# Patient Record
Sex: FEMALE | Race: WHITE | NOT HISPANIC OR LATINO | Employment: UNEMPLOYED
[De-identification: names, ages, dates, MRNs, and addresses within clinical notes are randomized per-mention and may not be internally consistent; named-entity substitution may affect disease eponyms.]

---

## 2017-06-10 ENCOUNTER — HEALTH MAINTENANCE LETTER (OUTPATIENT)
Age: 42
End: 2017-06-10

## 2017-07-25 ENCOUNTER — OFFICE VISIT (OUTPATIENT)
Dept: OBGYN | Facility: CLINIC | Age: 42
End: 2017-07-25
Payer: COMMERCIAL

## 2017-07-25 VITALS
HEIGHT: 65 IN | WEIGHT: 137 LBS | BODY MASS INDEX: 22.82 KG/M2 | DIASTOLIC BLOOD PRESSURE: 66 MMHG | SYSTOLIC BLOOD PRESSURE: 112 MMHG

## 2017-07-25 DIAGNOSIS — Z11.8 SCREENING FOR CHLAMYDIAL DISEASE: ICD-10-CM

## 2017-07-25 DIAGNOSIS — Z01.419 ENCOUNTER FOR GYNECOLOGICAL EXAMINATION WITHOUT ABNORMAL FINDING: Primary | ICD-10-CM

## 2017-07-25 DIAGNOSIS — N92.1 MENORRHAGIA WITH IRREGULAR CYCLE: ICD-10-CM

## 2017-07-25 DIAGNOSIS — Z11.3 SCREEN FOR STD (SEXUALLY TRANSMITTED DISEASE): ICD-10-CM

## 2017-07-25 PROCEDURE — 99386 PREV VISIT NEW AGE 40-64: CPT | Performed by: OBSTETRICS & GYNECOLOGY

## 2017-07-25 PROCEDURE — 86803 HEPATITIS C AB TEST: CPT | Performed by: OBSTETRICS & GYNECOLOGY

## 2017-07-25 PROCEDURE — 36415 COLL VENOUS BLD VENIPUNCTURE: CPT | Performed by: OBSTETRICS & GYNECOLOGY

## 2017-07-25 PROCEDURE — 86696 HERPES SIMPLEX TYPE 2 TEST: CPT | Performed by: OBSTETRICS & GYNECOLOGY

## 2017-07-25 PROCEDURE — 87389 HIV-1 AG W/HIV-1&-2 AB AG IA: CPT | Performed by: OBSTETRICS & GYNECOLOGY

## 2017-07-25 PROCEDURE — 87491 CHLMYD TRACH DNA AMP PROBE: CPT | Performed by: OBSTETRICS & GYNECOLOGY

## 2017-07-25 PROCEDURE — 86695 HERPES SIMPLEX TYPE 1 TEST: CPT | Performed by: OBSTETRICS & GYNECOLOGY

## 2017-07-25 PROCEDURE — 86780 TREPONEMA PALLIDUM: CPT | Performed by: OBSTETRICS & GYNECOLOGY

## 2017-07-25 PROCEDURE — 87340 HEPATITIS B SURFACE AG IA: CPT | Performed by: OBSTETRICS & GYNECOLOGY

## 2017-07-25 PROCEDURE — 87591 N.GONORRHOEAE DNA AMP PROB: CPT | Performed by: OBSTETRICS & GYNECOLOGY

## 2017-07-25 RX ORDER — BUPROPION HYDROCHLORIDE 150 MG/1
150 TABLET ORAL
COMMUNITY
Start: 2010-02-18 | End: 2018-10-31

## 2017-07-25 ASSESSMENT — ANXIETY QUESTIONNAIRES
5. BEING SO RESTLESS THAT IT IS HARD TO SIT STILL: MORE THAN HALF THE DAYS
7. FEELING AFRAID AS IF SOMETHING AWFUL MIGHT HAPPEN: NOT AT ALL
1. FEELING NERVOUS, ANXIOUS, OR ON EDGE: SEVERAL DAYS
3. WORRYING TOO MUCH ABOUT DIFFERENT THINGS: NOT AT ALL
2. NOT BEING ABLE TO STOP OR CONTROL WORRYING: NOT AT ALL
6. BECOMING EASILY ANNOYED OR IRRITABLE: MORE THAN HALF THE DAYS
IF YOU CHECKED OFF ANY PROBLEMS ON THIS QUESTIONNAIRE, HOW DIFFICULT HAVE THESE PROBLEMS MADE IT FOR YOU TO DO YOUR WORK, TAKE CARE OF THINGS AT HOME, OR GET ALONG WITH OTHER PEOPLE: SOMEWHAT DIFFICULT
GAD7 TOTAL SCORE: 7

## 2017-07-25 ASSESSMENT — PATIENT HEALTH QUESTIONNAIRE - PHQ9: 5. POOR APPETITE OR OVEREATING: MORE THAN HALF THE DAYS

## 2017-07-25 NOTE — PROGRESS NOTES
Maria Esther is a 42 year old  female who presents for annual exam.     Besides routine health maintenance,  she would like to discuss getting cervix scraped.    HPI:  The patient's PCP is Faby Doshi MD.    Having issues with heavy bleeding. Can last 11 days. This has gone on for years. Can also have early cycles and late ones. Can also have midcycle spotting.   Wonders if she has prolapse. Feels some bladder dropping.  Has hx of BV. Douches every day with either vinegar or H202  Has multiple partners. Wants STD testing.        GYNECOLOGIC HISTORY:    Patient's last menstrual period was 2017 (exact date).  Her current contraception method is: tubal ligation and condoms.  She  reports that she has been smoking Cigarettes.  She has smoked for the past 5.00 years. She has never used smokeless tobacco.  Tobacco Cessation Action Plan: Information offered: Patient not interested at this time  Patient is sexually active.  STD testing offered?  Accepted  Last PHQ-9 score on record =   PHQ-9 SCORE 2017   Total Score -   Total Score 5     Last GAD7 score on record =   STEVEN-7 SCORE 2017   Total Score 7     Alcohol Score = 3    HEALTH MAINTENANCE:  Last Mammo: 11, Result: normal, Next Mammo: Needs to schedule  Pap:   Lab Results   Component Value Date    PAP NIL, HPV- 02/10/2016    PAP ASC-US 2011    PAP NIL 2010      Colonoscopy:  Never, Result: not applicable  Dexa:  Never    Health maintenance updated:  yes    HISTORY:  Obstetric History       T3      L3     SAB0   TAB0   Ectopic0   Multiple0   Live Births2       # Outcome Date GA Lbr Lyndon/2nd Weight Sex Delivery Anes PTL Lv   3 Term 99 39w0d  6 lb 14 oz (3.118 kg) M CS-Unspec Spinal  BRAD      Name: Bakari   2 Term 98 37w0d 05:00 4 lb 11 oz (2.126 kg) M CS-Unspec Spinal  BRAD      Name: Tamiko   1 Term                   Patient Active Problem List   Diagnosis     Nondependent amphetamine or related acting  "sympathomimetic abuse     Restless leg syndrome     Mild major depression (H)     Alcohol abuse, in remission     High-risk pregnancy     Bipolar affective disorder (H)     CARDIOVASCULAR SCREENING; LDL GOAL LESS THAN 160     ASCUS on Pap smear     Past Surgical History:   Procedure Laterality Date     C ANESTH,SURG BREAST RECONSTRUCTIVE       C NONSPECIFIC PROCEDURE   &      x 2, previous tear in cervix      Social History   Substance Use Topics     Smoking status: Current Every Day Smoker     Years: 5.00     Types: Cigarettes     Smokeless tobacco: Never Used      Comment: 2-3 cigs/day     Alcohol use No      *Patient is Adopted          Negative family history of: Unknown/Adopted            Current Outpatient Prescriptions   Medication Sig     buPROPion (WELLBUTRIN XL) 150 MG 24 hr tablet Take 150 mg by mouth     ROPINIRole HCl (REQUIP PO)      TOPAMAX 50 MG PO TABS 1 TABLET DAILY at bedtime (Patient not taking: No sig reported)     No current facility-administered medications for this visit.      Allergies   Allergen Reactions     Oxycontin [Oxycodone Hcl] Itching     Percocet [Codeine] Itching     Vicodin [Acetaminophen] Itching       Past medical, surgical, social and family histories were reviewed and updated in EPIC.    ROS:   12 point review of systems negative other than symptoms noted below.  Psychiatric: Depression and Mckeon    EXAM:  /66  Ht 5' 4.5\" (1.638 m)  Wt 137 lb (62.1 kg)  LMP 2017 (Exact Date)  BMI 23.15 kg/m2   BMI: Body mass index is 23.15 kg/(m^2).    PHYSICAL EXAM:  Constitutional:  Appearance: Well nourished, well developed, alert, in no acute distress  Neck:  Lymph Nodes:  No lymphadenopathy present    Thyroid:  Gland size normal, nontender, no nodules or masses present  on palpation  Chest:  Respiratory Effort:  Breathing unlabored  Cardiovascular:    Heart: Auscultation:  Regular rate, normal rhythm, no murmurs present  Breasts: Inspection of Breasts:  " No lymphadenopathy present, IMPLANTS PRESENT BILATERALY    Palpation of Breasts and Axillae:  No masses present on palpation, no  breast tenderness    Axillary Lymph Nodes:  No lymphadenopathy present  Gastrointestinal:   Abdominal Examination:  Abdomen nontender to palpation, tone normal without rigidity or guarding, no masses present, umbilicus without lesions   Liver and Spleen:  No hepatomegaly present, liver nontender to palpation    Hernias:  No hernias present  Lymphatic: Lymph Nodes:  No other lymphadenopathy present  Skin:  General Inspection:  No rashes present, no lesions present, no areas of  discoloration    Genitalia and Groin:  No rashes present, no lesions present, no areas of  discoloration, no masses present  Neurologic/Psychiatric:    Mental Status:  Oriented X3     Pelvic Exam:  External Genitalia:     Normal appearance for age, no discharge present, no tenderness present, no inflammatory lesions present, color normal  Vagina:     Normal vaginal vault without central or paravaginal defects, no discharge present, no inflammatory lesions present, no masses present  Bladder:     Nontender to palpation  Urethra:   Urethral Body:  Urethra palpation normal, urethra structural support normal   Urethral Meatus:  No erythema or lesions present  Cervix:     Appearance healthy, no lesions present, nontender to palpation, no bleeding present  Uterus:     Uterus: firm, normal sized and nontender, anteverted in position.   Adnexa:     No adnexal tenderness present, no adnexal masses present  Perineum:     Perineum within normal limits, no evidence of trauma, no rashes or skin lesions present  Anus:     Anus within normal limits, no hemorrhoids present  Inguinal Lymph Nodes:     No lymphadenopathy present  Pubic Hair:     Normal pubic hair distribution for age  Genitalia and Groin:     No rashes present, no lesions present, no areas of discoloration, no masses present      COUNSELING:   Reviewed preventive  health counseling, as reflected in patient instructions    BMI: Body mass index is 23.15 kg/(m^2).        ASSESSMENT:  42 year old female with satisfactory annual exam.  ICD-10-CM    1. Encounter for gynecological examination without abnormal finding Z01.419    2. Screen for STD (sexually transmitted disease) Z11.3 NEISSERIA GONORRHOEA PCR     Anti Treponema     Herpes Simplex Virus 1 and 2 IgG     HIV Antigen Antibody Combo     Hepatitis B Surface  Antigen     Hepatitis C Antibody   3. Screening for chlamydial disease Z11.8 CHLAMYDIA TRACHOMATIS PCR       PLAN:  Discussed probiotics for vaginal health and less douching.   Discussed Ablation but will still have irregular bleeding just lighter. Discussed micronor since she still smokes.   Discussed smoking.  Referred back to PCP for restarting her psych meds.   Reassured no prolapse.    Clyde Piña MD

## 2017-07-25 NOTE — MR AVS SNAPSHOT
"              After Visit Summary   7/25/2017    Maria Esther Haro    MRN: [id number]           Patient Information     Date Of Birth          1975        Visit Information        Provider Department      7/25/2017 2:30 PM Clyde Piña MD [facility]        Today's Diagnoses     Encounter for gynecological examination without abnormal finding    -  1    Screen for STD (sexually transmitted disease)        Screening for chlamydial disease        Menorrhagia with irregular cycle           Follow-ups after your visit        Who to contact     If you have questions or need follow up information about today's clinic visit or your schedule please contact [facility] directly at [phone].  Normal or non-critical lab and imaging results will be communicated to you by MyChart, letter or phone within 4 business days after the clinic has received the results. If you do not hear from us within 7 days, please contact the clinic through [employer] or phone. If you have a critical or abnormal lab result, we will notify you by phone as soon as possible.  Submit refill requests through [employer] or call your pharmacy and they will forward the refill request to us. Please allow 3 business days for your refill to be completed.          Additional Information About Your Visit        MyChart Information     [employer] gives you secure access to your electronic health record. If you see a primary care provider, you can also send messages to your care team and make appointments. If you have questions, please call your primary care clinic.  If you do not have a primary care provider, please call [phone] and they will assist you.        Care EveryWhere ID     This is your Care EveryWhere ID. This could be used by other organizations to access your [facility] medical records  [id number]        Your Vitals Were     Height Last Period BMI (Body Mass Index)             5' 4.5\" (1.638 m) " 07/18/2017 (Exact Date) 23.15 kg/m2          Blood Pressure from Last 3 Encounters:   07/25/17 112/66   07/11/16 100/69   07/11/16 120/60    Weight from Last 3 Encounters:   07/25/17 137 lb (62.1 kg)   07/11/16 135 lb (61.2 kg)   07/11/16 135 lb (61.2 kg)              We Performed the Following     Anti Treponema     CHLAMYDIA TRACHOMATIS PCR     Hepatitis B Surface  Antigen     Hepatitis C Antibody     Herpes Simplex Virus 1 and 2 IgG     HIV Antigen Antibody Combo     NEISSERIA GONORRHOEA PCR        Primary Care Provider Office Phone # Fax #    Dianadiana Doshi [phone]       [facility] [address] [facility] [address]        Equal Access to Services     BROOKE GONZALEZ : Hadii alex richmondo Soenrico, waaxda luqadaha, qaybta kaalmada adekellyyavivi, lyn mccarthy. So [facility] [phone].    ATENCIÓN: Si habla español, tiene a figueroa disposición servicios gratuitos de asistencia lingüística. Llame al [phone].    We comply with applicable federal civil rights laws and [place] laws. We do not discriminate on the basis of race, color, national origin, age, disability sex, sexual orientation or gender identity.            Thank you!     Thank you for choosing [facility]  for your care. Our goal is always to provide you with excellent care. Hearing back from our patients is one way we can continue to improve our services. Please take a few minutes to complete the written survey that you may receive in the mail after your visit with us. Thank you!             Your Updated Medication List - Protect others around you: Learn how to safely use, store and throw away your medicines at www.disposemymeds.org.          This list is accurate as of: 7/25/17  4:36 PM.  Always use your most recent med list.                   Brand Name Dispense Instructions for use Diagnosis    buPROPion 150 MG 24 hr tablet    WELLBUTRIN XL     Take 150 mg by mouth         REQUIP PO           TOPAMAX 50 MG tablet   Generic drug:  topiramate     60 Tab    1 TABLET DAILY at bedtime    Bipolar affective disorder (H), Mild major depression (H)

## 2017-07-26 LAB
C TRACH DNA SPEC QL NAA+PROBE: NORMAL
N GONORRHOEA DNA SPEC QL NAA+PROBE: NORMAL
SPECIMEN SOURCE: NORMAL
SPECIMEN SOURCE: NORMAL

## 2017-07-26 ASSESSMENT — PATIENT HEALTH QUESTIONNAIRE - PHQ9: SUM OF ALL RESPONSES TO PHQ QUESTIONS 1-9: 5

## 2017-07-26 ASSESSMENT — ANXIETY QUESTIONNAIRES: GAD7 TOTAL SCORE: 7

## 2017-07-27 LAB
HBV SURFACE AG SERPL QL IA: NONREACTIVE
HCV AB SERPL QL IA: NORMAL
HIV 1+2 AB+HIV1 P24 AG SERPL QL IA: NORMAL
HSV1 IGG SERPL QL IA: ABNORMAL AI (ref 0–0.8)
HSV2 IGG SERPL QL IA: 4.6 AI (ref 0–0.8)
T PALLIDUM IGG+IGM SER QL: NEGATIVE

## 2018-04-12 ENCOUNTER — TELEPHONE (OUTPATIENT)
Dept: OBGYN | Facility: CLINIC | Age: 43
End: 2018-04-12

## 2018-04-12 NOTE — TELEPHONE ENCOUNTER
Pt is past due for f/u pap smear.  Reminder letter was sent 10/20/17.  LMTC and schedule at [facility].  Left this writer's number in case of questions ([phone]).  If no reply and/or appt within 2 weeks (04/26/18) pt will be considered lost to pap tracking f/u.  Nalini Culp,    Pap Tracking

## 2018-05-04 ENCOUNTER — OFFICE VISIT (OUTPATIENT)
Dept: INTERNAL MEDICINE | Facility: CLINIC | Age: 43
End: 2018-05-04
Payer: COMMERCIAL

## 2018-05-04 VITALS
WEIGHT: 144.5 LBS | SYSTOLIC BLOOD PRESSURE: 140 MMHG | RESPIRATION RATE: 16 BRPM | TEMPERATURE: 98 F | BODY MASS INDEX: 24.07 KG/M2 | HEART RATE: 92 BPM | DIASTOLIC BLOOD PRESSURE: 90 MMHG | HEIGHT: 65 IN

## 2018-05-04 DIAGNOSIS — L01.00 IMPETIGO: Primary | ICD-10-CM

## 2018-05-04 PROCEDURE — 99203 OFFICE O/P NEW LOW 30 MIN: CPT | Performed by: PHYSICIAN ASSISTANT

## 2018-05-04 RX ORDER — MUPIROCIN CALCIUM 20 MG/G
CREAM TOPICAL 3 TIMES DAILY
Qty: 15 G | Refills: 0 | Status: SHIPPED | OUTPATIENT
Start: 2018-05-04 | End: 2019-02-14

## 2018-05-04 ASSESSMENT — PAIN SCALES - GENERAL: PAINLEVEL: MILD PAIN (2)

## 2018-05-04 NOTE — PATIENT INSTRUCTIONS
Understanding Impetigo  Impetigo is a common bacterial infection of the skin. It most often affects the face, arms, and legs. But it can appear on any part of the body. Anyone can have it, regardless of age. But it is most common in children. Impetigo is very contagious. This means it spreads easily to other people.  How to say it  [id number]   What causes impetigo?  Many types of bacteria live on normal, healthy skin. The bacteria usually don t cause problems. Impetigo happens when bacteria enter the skin through a scratch, break, sore, bite, or irritated spot. They then begin to grow out of control, leading to infection. There are two types of staphylococcus bacteria that cause impetigo. In certain cases, impetigo appears on skin that has no visible break. It may be more likely to occur on skin that has another skin problem, such as eczema. It may also be more common after a cold or other virus.  Symptoms of impetigo  Symptoms of this problem include:    Small, fluid-filled blisters on the skin that may itch, ooze, or crust    A yellow, honey-colored crust on the infected skin    Skin sores that spread with scratching    An itchy rash that spreads with scratching    Swollen lymph nodes  Treatment for impetigo  The goal is to treat the infection and prevent it from spreading to others.    You will likely be given an antibiotic to treat the infection. This may be a cream or ointment called muporicin to put on your skin. If the infection is severe or spreading, you may be given antibiotic medicine to take by mouth. Be sure to use this medicine as directed. Do not stop using it until you are told to stop, even if your skin gets better. If you stop too soon, the infection may come back and be harder to treat.    Avoid scratching or picking at your sores. It may help to cover affected areas with a bandage.    To prevent spreading the infection, wash your hands often. Avoid sharing personal items, towels, clothes,  pillows, and sheets with others. After each use, wash these items in hot water.    Clean the affected skin several times a day. Don t scrub. Instead, soak the area in warm, soapy water. This will help remove the crust that forms. For places that you can't soak, such as the face, place a clean, warm (not hot) washcloth on the affected area. Use a new washcloth and towel each time.  When to call your healthcare provider  Call your healthcare provider right away if you have any of these:    Fever of 100.4 F (38 C) or higher, or as directed    Increasing number of sores or spreading areas of redness after 2 days of treatment with antibiotics    Increasing swelling or pain    Increased amounts of fluid or pus coming from the sores    Unusual drowsiness, weakness, or change in behavior    Loss of appetite or vomiting   Date Last Reviewed: 5/1/2016 2000-2017 [employer]. [address] [place] [address], [place] [address]. All rights reserved. This information is not intended as a substitute for professional medical care. Always follow your healthcare professional's instructions.

## 2018-05-04 NOTE — PROGRESS NOTES
"  SUBJECTIVE:   Maria Esther Haro is a 43 year old female who presents to clinic today for the following health issues:      Pt is here today stating she has something on the Right side of her face. It is not itchy, but does have some clear liquid coming out of it and just runs. She states it is very painful. Pt has had this for about 2 wks.   Patient wondering if it might be impetigo.    No other areas of rash/ or swelling no fever or chills.       -------------------------------------    Problem list and histories reviewed & adjusted, as indicated.  Additional history: as documented    Labs reviewed in EPIC    Reviewed and updated as needed this visit by clinical staff  Tobacco  Allergies  Meds       Reviewed and updated as needed this visit by Provider  Tobacco  Allergies  Meds  Soc Hx        ROS:  Constitutional, HEENT, cardiovascular, pulmonary, gi and gu systems are negative, except as otherwise noted.    OBJECTIVE:     /90 (BP Location: Left arm, Patient Position: Chair, Cuff Size: Adult Regular)  Pulse 92  Temp 98  F (36.7  C) (Oral)  Resp 16  Ht 5' 4.5\" (1.638 m)  Wt 144 lb 8 oz (65.5 kg)  BMI 24.42 kg/m2  Body mass index is 24.42 kg/(m^2).  GENERAL: healthy, alert and no distress  RESP: lungs clear to auscultation - no rales, rhonchi or wheezes  CV: regular rate and rhythm, normal S1 S2, no S3 or S4, no murmur, click or rub, no peripheral edema and peripheral pulses strong  MS: no gross musculoskeletal defects noted, no edema  SKIN: red raised crusted lesion on right side of nose/ nasal labial fold area.   Yellow crusting     Diagnostic Test Results:  none     ASSESSMENT/PLAN:             1. Impetigo    - mupirocin (BACTROBAN) 2 % cream; Apply topically 3 times daily for 7 days  Dispense: 15 g; Refill: 0    Reviewed hygiene and contagious nature,   Avoid makeup- until doing better,   Wash or toss makeup brushes sponges.     See Patient Instructions    LETY Loyd " [facility]

## 2018-06-16 ENCOUNTER — HEALTH MAINTENANCE LETTER (OUTPATIENT)
Age: 43
End: 2018-06-16

## 2018-10-31 ENCOUNTER — RESULT FOLLOW UP (OUTPATIENT)
Dept: OBGYN | Facility: CLINIC | Age: 43
End: 2018-10-31

## 2018-10-31 ENCOUNTER — OFFICE VISIT (OUTPATIENT)
Dept: OBGYN | Facility: CLINIC | Age: 43
End: 2018-10-31
Payer: COMMERCIAL

## 2018-10-31 VITALS
HEART RATE: 92 BPM | BODY MASS INDEX: 23.63 KG/M2 | SYSTOLIC BLOOD PRESSURE: 124 MMHG | DIASTOLIC BLOOD PRESSURE: 88 MMHG | WEIGHT: 139.8 LBS

## 2018-10-31 DIAGNOSIS — F31.9 BIPOLAR AFFECTIVE DISORDER, REMISSION STATUS UNSPECIFIED (H): ICD-10-CM

## 2018-10-31 DIAGNOSIS — Z11.3 SCREENING FOR STDS (SEXUALLY TRANSMITTED DISEASES): ICD-10-CM

## 2018-10-31 DIAGNOSIS — N89.8 VAGINAL DISCHARGE: ICD-10-CM

## 2018-10-31 DIAGNOSIS — N89.8 VAGINAL IRRITATION: Primary | ICD-10-CM

## 2018-10-31 DIAGNOSIS — Z12.4 SCREENING FOR CERVICAL CANCER: ICD-10-CM

## 2018-10-31 PROBLEM — B00.9 HSV-2 (HERPES SIMPLEX VIRUS 2) INFECTION: Status: ACTIVE | Noted: 2018-10-31

## 2018-10-31 LAB
SPECIMEN SOURCE: NORMAL
WET PREP SPEC: NORMAL

## 2018-10-31 PROCEDURE — 87591 N.GONORRHOEAE DNA AMP PROB: CPT | Performed by: ADVANCED PRACTICE MIDWIFE

## 2018-10-31 PROCEDURE — G0476 HPV COMBO ASSAY CA SCREEN: HCPCS | Performed by: ADVANCED PRACTICE MIDWIFE

## 2018-10-31 PROCEDURE — 99213 OFFICE O/P EST LOW 20 MIN: CPT | Performed by: ADVANCED PRACTICE MIDWIFE

## 2018-10-31 PROCEDURE — 87210 SMEAR WET MOUNT SALINE/INK: CPT | Performed by: ADVANCED PRACTICE MIDWIFE

## 2018-10-31 PROCEDURE — G0145 SCR C/V CYTO,THINLAYER,RESCR: HCPCS | Performed by: ADVANCED PRACTICE MIDWIFE

## 2018-10-31 PROCEDURE — 87491 CHLMYD TRACH DNA AMP PROBE: CPT | Performed by: ADVANCED PRACTICE MIDWIFE

## 2018-10-31 RX ORDER — METRONIDAZOLE 500 MG/1
500 TABLET ORAL 2 TIMES DAILY
Qty: 14 TABLET | Refills: 0 | Status: SHIPPED | OUTPATIENT
Start: 2018-10-31 | End: 2019-01-09

## 2018-10-31 RX ORDER — FLUCONAZOLE 150 MG/1
150 TABLET ORAL ONCE
Qty: 2 TABLET | Refills: 0 | Status: SHIPPED | OUTPATIENT
Start: 2018-10-31 | End: 2019-02-05

## 2018-10-31 NOTE — PROGRESS NOTES
"SUBJECTIVE:   Maria Esther is a 43 year old here for vaginal symptoms:  Noticed a funny smell, irritation and discharge after using a \"potassium powder to douche that supposedly makes everything tighter.\"  Also feels like she is not coping well with her bipolar disorder, no immediate concerns but would like to reestablish care. She is also reporting she is ready to quit smoking.                Vaginal Symptoms     Onset: 1 week    Description:  Vaginal Discharge: clear  Itching (Pruritis): Yes  Burning sensation:  No  Odor:  Yes: \"funky\"  Irritation:  Yes    Accompanying Signs & Symptoms:  Pain with Urination: No  Abdominal Pain:  No  Fever: No   History:   Sexually active:  Yes  New Partner:  Yes:   Possibility of Pregnancy:  No  Contraceptive type: condoms    Precipitating factors:   Recent Antibiotic Use: No    Alleviating factors:  None   Therapies Tried and outcome:   Previous Episodes of Vaginitis:  Yes: many times, tried OTC monistat for 2 days      Other associated symptoms: none.  History of STI's:  Yes:   STI Testing offered: YES    LMP: Patient's last menstrual period was 10/10/2018 (approximate).      Patient Active Problem List   Diagnosis     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     Restless leg syndrome     Mild major depression (H)     Alcohol abuse, in remission     Bipolar affective disorder (H)     ASCUS on Pap smear     HSV-2 (herpes simplex virus 2) infection     Past Medical History:   Diagnosis Date     Alcohol abuse, in remission     Remission since 2003     ASCUS on Pap smear 5/16/11    HPV-pos. 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, in remission (H)     Since May 2001     Other, mixed, or unspecified nondependent drug abuse, in remission     In remission since 2003 (cocaine and ectasy)     Restless leg syndrome      Past Surgical History: "   Procedure Laterality Date     C ANESTH,SURG BREAST RECONSTRUCTIVE      Breast augmentation     C NONSPECIFIC PROCEDURE   &      x 3, previous tear in cervix     Current Outpatient Prescriptions   Medication Sig Dispense Refill     fluconazole (DIFLUCAN) 150 MG tablet Take 1 tablet (150 mg) by mouth once for 1 dose Take one tablet now, repeat dose after metronidazole is finished 2 tablet 0     metroNIDAZOLE (FLAGYL) 500 MG tablet Take 1 tablet (500 mg) by mouth 2 times daily 14 tablet 0     Allergies   Allergen Reactions     Acetaminophen Itching     No Clinical Screening - See Comments      Other reaction(s): Unknown     Oxycodone Itching     Oxycontin [Oxycodone Hcl] Itching     Percocet [Oxycodone-Acetaminophen] Itching     Vicodin [Acetaminophen] Itching       Health maintenance updated:  yes    ROS:   12 point review of systems negative other than symptoms noted below.    PHYSICAL EXAM:    /88 (BP Location: Left arm, Patient Position: Sitting, Cuff Size: Adult Regular)  Pulse 92  Wt 139 lb 12.8 oz (63.4 kg)  LMP 10/10/2018 (Approximate)  Breastfeeding? No  BMI 23.63 kg/m2, Body mass index is 23.63 kg/(m^2).  General appearance:  healthy, alert and no distress  Pelvic Exam:  Vulva: No lesions, no adenopathy, BUS WNL  Vagina: Moist, pink, discharge consistent with BV and consistent with yeast  well rugated, no lesions  Cervix:smooth, pink, no visible lesions  Rectal exam: deferred    ASSESSMENT/PLAN:     ICD-10-CM    1. Vaginal irritation N89.8 metroNIDAZOLE (FLAGYL) 500 MG tablet     fluconazole (DIFLUCAN) 150 MG tablet   2. Vaginal discharge N89.8 Wet prep     metroNIDAZOLE (FLAGYL) 500 MG tablet     fluconazole (DIFLUCAN) 150 MG tablet   3. Screening for STDs (sexually transmitted diseases) Z11.3 Wet prep     NEISSERIA GONORRHOEA PCR     CHLAMYDIA TRACHOMATIS PCR     Treponema Abs w Reflex to RPR and Titer     HIV Antigen Antibody Combo     Hepatitis B Surface  Antigen      Hepatitis C antibody   4. Bipolar affective disorder, remission status unspecified (H) F31.9 MENTAL HEALTH REFERRAL  - Adult; Psychiatry and Medication Management; Psychiatry; [facility]: [facility] [phone].  Medication management & future refills will be returned to G PCP upon completion of evaluation; We chetan...   5. Screening for cervical cancer Z12.4 Pap imaged thin layer screen with HPV - recommended age 30 - 65 years (select HPV order below)     HPV High Risk Types DNA Cervical       This visit results pending at time of note      COUNSELING:  Advised no alcohol during treat with flagyl  Patient verbalizes agreement to stay away form alcohol after discussing side effect of flagyl and alcohol use  Abstain from sexual intercourse while being treated for vaginal infection  Discussed ways to prevent vaginitis and it is not recommended to douche or use products that are not supported by evidence base  RX sent to requested pharmacy  Mental health referral to psychiatry  Plan for follow up for annual  Pap done today due to patient missing 6 month f/u appt and abnormal history  Quit plan information given  Ready to quit: Yes  Counseling given: Yes    Return to clinic if symptoms persist or worsen    20 minutes was spent face to face with the patient today discussing her history, diagnosis, and follow-up plan as noted above. Over 50% of the visit was spent in counseling and coordination of care.    Total Visit Time: 20 minutes.     ZACK Chavarria, JIMMIE

## 2018-10-31 NOTE — MR AVS SNAPSHOT
After Visit Summary   10/31/2018    Maria Esther Haro    MRN: [id number]           Patient Information     Date Of Birth          1975        Visit Information        Provider Department      10/31/2018 3:30 PM Amanda Espino APRN CNM [facility]        Today's Diagnoses     Vaginal irritation    -  1    Vaginal discharge        Screening for STDs (sexually transmitted diseases)        Bipolar affective disorder, remission status unspecified (H)        Screening for cervical cancer           Follow-ups after your visit        Additional Services     MENTAL HEALTH REFERRAL  - Adult; Psychiatry and Medication Management; Psychiatry; [facility]: [facility] Psychiatry Service [phone].  Medication management & future refills will be returned to G PCP upon completion of evaluation; We chetan...       All scheduling is subject to the client's specific insurance plan & benefits, provider/location availability, and provider clinical specialities.  Please arrive 15 minutes early for your first appointment and bring your completed paperwork.    Please be aware that coverage of these services is subject to the terms and limitations of your health insurance plan.  Call member services at your health plan with any benefit or coverage questions.                            Follow-up notes from your care team     Return in about 4 weeks (around 11/28/2018) for Physical Exam.      Who to contact     If you have questions or need follow up information about today's clinic visit or your schedule please contact [facility] directly at [phone].  Normal or non-critical lab and imaging results will be communicated to you by MyChart, letter or phone within 4 business days after the clinic has received the results. If you do not hear from us within 7 days, please contact the clinic through MyChart or phone. If you have a critical or abnormal lab result,  we will notify you by phone as soon as possible.  Submit refill requests through [employer] or call your pharmacy and they will forward the refill request to us. Please allow 3 business days for your refill to be completed.          Additional Information About Your Visit        Vital Accesshart Information     [employer] gives you secure access to your electronic health record. If you see a primary care provider, you can also send messages to your care team and make appointments. If you have questions, please call your primary care clinic.  If you do not have a primary care provider, please call [phone] and they will assist you.        Care EveryWhere ID     This is your Care EveryWhere ID. This could be used by other organizations to access your [facility] medical records  [id number]        Your Vitals Were     Pulse Last Period Breastfeeding? BMI (Body Mass Index)          92 10/10/2018 (Approximate) No 23.63 kg/m2         Blood Pressure from Last 3 Encounters:   10/31/18 124/88   05/04/18 140/90   07/25/17 112/66    Weight from Last 3 Encounters:   10/31/18 139 lb 12.8 oz (63.4 kg)   05/04/18 144 lb 8 oz (65.5 kg)   07/25/17 137 lb (62.1 kg)              We Performed the Following     CHLAMYDIA TRACHOMATIS PCR     Hepatitis B Surface  Antigen     Hepatitis C antibody     HIV Antigen Antibody Combo     HPV High Risk Types DNA Cervical     MENTAL HEALTH REFERRAL  - Adult; Psychiatry and Medication Management; Psychiatry; G: [facility] Psychiatry Service [phone].  Medication management & future refills will be returned to G PCP upon completion of evaluation; We chetan...     NEISSERIA GONORRHOEA PCR     Pap imaged thin layer screen with HPV - recommended age 30 - 65 years (select HPV order below)     Treponema Abs w Reflex to RPR and Titer     Wet prep          Today's Medication Changes          These changes are accurate as of 10/31/18  4:13 PM.  If you have any questions, ask your nurse or doctor.                Start taking these medicines.        Dose/Directions    fluconazole 150 MG tablet   Commonly known as:  DIFLUCAN   Used for:  Vaginal irritation, Vaginal discharge   Started by:  Amanda Espino APRN CNM        Dose:  150 mg   Take 1 tablet (150 mg) by mouth once for 1 dose Take one tablet now, repeat dose after metronidazole is finished   Quantity:  2 tablet   Refills:  0       metroNIDAZOLE 500 MG tablet   Commonly known as:  FLAGYL   Used for:  Vaginal irritation, Vaginal discharge   Started by:  Amanda Espino APRN CNMAYO        Dose:  500 mg   Take 1 tablet (500 mg) by mouth 2 times daily   Quantity:  14 tablet   Refills:  0            Where to get your medicines      These medications were sent to [facility] [address] [place] [address] [address] [facility] [place] [address] [facility] [address]     Phone:  [phone]     fluconazole 150 MG tablet    metroNIDAZOLE 500 MG tablet                Primary Care Provider Office Phone # Fax #    [facility] [phone] [address] [address]        Equal Access to Services     BROOKE GONZALEZ AH: Hadii aad ku hadasho Soomaali, waaxda luqadaha, qaybta kaalmada adeegyada, lyn ball haymone gao . So [facility] [phone].    ATENCIÓN: Si habla español, tiene a figueroa disposición servicios gratuitos de asistencia lingüística. [facility] [phone].    We comply with applicable federal civil rights laws and [place] laws. We do not discriminate on the basis of race, color, national origin, age, disability, sex, sexual orientation, or gender identity.            Thank you!     Thank you for choosing [facility]  for your care. Our goal is always to provide you with excellent care. Hearing back from our patients is one way we can continue to improve our services. Please take a few minutes to complete the written survey that you may  receive in the mail after your visit with us. Thank you!             Your Updated Medication List - Protect others around you: Learn how to safely use, store and throw away your medicines at www.disposemymeds.org.          This list is accurate as of 10/31/18  4:13 PM.  Always use your most recent med list.                   Brand Name Dispense Instructions for use Diagnosis    fluconazole 150 MG tablet    DIFLUCAN    2 tablet    Take 1 tablet (150 mg) by mouth once for 1 dose Take one tablet now, repeat dose after metronidazole is finished    Vaginal irritation, Vaginal discharge       metroNIDAZOLE 500 MG tablet    FLAGYL    14 tablet    Take 1 tablet (500 mg) by mouth 2 times daily    Vaginal irritation, Vaginal discharge

## 2018-10-31 NOTE — LETTER
October 17, 2019      Maria Esther Haro  [address] [place] [id number]    Dear ,      This letter is to remind you that you are due for your follow up PAP smear and HPV test on or about 10/31/19.    Please call [phone] to schedule your appointment at your earliest convenience.     If you have completed the tests outside of [place], please have the results forwarded to our office. We will update the chart for your primary Physician to review before your next annual physical.     Sincerely,      Your [place] Care Team/[facility]

## 2018-11-01 ENCOUNTER — TELEPHONE (OUTPATIENT)
Dept: OBGYN | Facility: CLINIC | Age: 43
End: 2018-11-01

## 2018-11-01 DIAGNOSIS — A74.9 CHLAMYDIA INFECTION: Primary | ICD-10-CM

## 2018-11-01 LAB
C TRACH DNA SPEC QL NAA+PROBE: POSITIVE
N GONORRHOEA DNA SPEC QL NAA+PROBE: NEGATIVE
SPECIMEN SOURCE: ABNORMAL
SPECIMEN SOURCE: NORMAL

## 2018-11-01 NOTE — TELEPHONE ENCOUNTER
Routing chart to MW- do need treatment plan for chlamydia-  I started the paperwork. Will try calling the pt after treatment plan decided upon.   Zithromax vs Doxycycline

## 2018-11-01 NOTE — PROGRESS NOTES
Please inform patient of positive chlamydia and initiate [facility] report along with treatment. I attempted to call patient to discuss and there was no answer. Thanks    Aviva

## 2018-11-02 LAB
COPATH REPORT: NORMAL
PAP: NORMAL

## 2018-11-02 RX ORDER — AZITHROMYCIN 500 MG/1
1000 TABLET, FILM COATED ORAL ONCE
Qty: 2 TABLET | Refills: 1 | Status: SHIPPED | OUTPATIENT
Start: 2018-11-02 | End: 2018-11-02

## 2018-11-02 RX ORDER — AZITHROMYCIN 500 MG/1
1000 TABLET, FILM COATED ORAL ONCE
Qty: 2 TABLET | Refills: 1 | Status: SHIPPED | OUTPATIENT
Start: 2018-11-02 | End: 2019-02-05

## 2018-11-02 NOTE — TELEPHONE ENCOUNTER
Contacted the pt. Received partner information. Pt partner requesting treatment.  Pharmacy [facility] info faxed

## 2018-11-02 NOTE — TELEPHONE ENCOUNTER
Patient is returning the nurse call, pls call her back, she said it is okay to leave a v/m message for her if she doesn't answer

## 2018-11-02 NOTE — TELEPHONE ENCOUNTER
Partner treatment sent to pharmacy of choice through Maria Esther's chart.    Ricardo Butcher, DNP, APRN, CNM

## 2018-11-02 NOTE — TELEPHONE ENCOUNTER
Azithromycin 1 g x 1 dose orally. I pended the order! Let me know if you need anything, I am off tomorrow but ill be around.    Aviva

## 2018-11-05 LAB
FINAL DIAGNOSIS: NORMAL
HPV HR 12 DNA CVX QL NAA+PROBE: NEGATIVE
HPV16 DNA SPEC QL NAA+PROBE: NEGATIVE
HPV18 DNA SPEC QL NAA+PROBE: NEGATIVE
SPECIMEN DESCRIPTION: NORMAL
SPECIMEN SOURCE CVX/VAG CYTO: NORMAL

## 2018-11-07 NOTE — PROGRESS NOTES
5/16/11 Pap - Ascus, + HPV 83 . Unable to assign carcinogenicity. Pt to have repeat pap done in one year.(5/12) Type 83 considered low risk (10/2018)  09/21/12 Pt. Has not responded to telephone calls, letters, or certified letters, message sent to Usman Sousa for Pt. To be put in the inactive file.  09/26/12 Per Usman Sousa, pt. Put in the inactive file for pap tracking due to no response from pt., episode resolved.  02/10/16 DX NL pap, neg HPV, pt. Is to have co-testing in one year.  04/26/18 Patient is lost to follow-up.   [Problem List copied and pasted above]  10/31/18 NIL pap, neg HR HPV. Plan 1 year cotest - if normal, can go to routine screening  10/17/19 IndelsulLamona cotest reminder message sent. ([facility])  11/14/19 Call, No answer and VM not set up. Will try again. ([facility])  11/15/19 [facility] clinic and schedule. ([facility])  12/10/19: NIL Pap, Neg HR HPV result. Plan routine screening per last years recommendation from provider. Pt was advised.

## 2018-12-26 ENCOUNTER — OFFICE VISIT (OUTPATIENT)
Dept: FAMILY MEDICINE | Facility: CLINIC | Age: 43
End: 2018-12-26
Payer: COMMERCIAL

## 2018-12-26 ENCOUNTER — TELEPHONE (OUTPATIENT)
Dept: FAMILY MEDICINE | Facility: CLINIC | Age: 43
End: 2018-12-26

## 2018-12-26 VITALS
HEART RATE: 102 BPM | OXYGEN SATURATION: 100 % | SYSTOLIC BLOOD PRESSURE: 117 MMHG | DIASTOLIC BLOOD PRESSURE: 68 MMHG | WEIGHT: 143 LBS | TEMPERATURE: 98 F | BODY MASS INDEX: 24.17 KG/M2

## 2018-12-26 DIAGNOSIS — F31.32 BIPOLAR AFFECTIVE DISORDER, CURRENTLY DEPRESSED, MODERATE (H): ICD-10-CM

## 2018-12-26 DIAGNOSIS — H53.8 BLURRED VISION: Primary | ICD-10-CM

## 2018-12-26 PROCEDURE — 99214 OFFICE O/P EST MOD 30 MIN: CPT | Performed by: FAMILY MEDICINE

## 2018-12-26 RX ORDER — BUPROPION HYDROCHLORIDE 150 MG/1
150 TABLET ORAL
COMMUNITY
Start: 2010-02-18 | End: 2019-01-09

## 2018-12-26 ASSESSMENT — ANXIETY QUESTIONNAIRES
6. BECOMING EASILY ANNOYED OR IRRITABLE: NEARLY EVERY DAY
GAD7 TOTAL SCORE: 5
1. FEELING NERVOUS, ANXIOUS, OR ON EDGE: SEVERAL DAYS
7. FEELING AFRAID AS IF SOMETHING AWFUL MIGHT HAPPEN: NOT AT ALL
2. NOT BEING ABLE TO STOP OR CONTROL WORRYING: NOT AT ALL
3. WORRYING TOO MUCH ABOUT DIFFERENT THINGS: NOT AT ALL
5. BEING SO RESTLESS THAT IT IS HARD TO SIT STILL: NOT AT ALL
IF YOU CHECKED OFF ANY PROBLEMS ON THIS QUESTIONNAIRE, HOW DIFFICULT HAVE THESE PROBLEMS MADE IT FOR YOU TO DO YOUR WORK, TAKE CARE OF THINGS AT HOME, OR GET ALONG WITH OTHER PEOPLE: EXTREMELY DIFFICULT

## 2018-12-26 ASSESSMENT — PATIENT HEALTH QUESTIONNAIRE - PHQ9
SUM OF ALL RESPONSES TO PHQ QUESTIONS 1-9: 11
5. POOR APPETITE OR OVEREATING: SEVERAL DAYS

## 2018-12-26 NOTE — PROGRESS NOTES
SUBJECTIVE:   Maria Esther Haro is a 43 year old female who presents to clinic today for the following health issues:      Depression, Bipolar with manic episodes and RLS -   Pt has been off medication for one year.   For the last year she has been experiencing difficult to keep relationships, overwhelmed. She feels that everything is a chore. She spends most of her time in her room. This is affecting relationships. She feels that she is currently feeling depressed. Last manic episode was around one week ago. She cycles every 4-5 days.   No hallucinations, delusions or SI.   She was previously on Wellbutrin 150, unsure of topamax dose and lowest dose of requip.   She was previously on depakote but got switched to topamax. She was switched due to pregnancy and weight gain.   Unsure why she stopped seeing the psychiatrist.     Eye - She feels that her left eye is drifting to the left. A few months ago she was wearing a contact only in the right eye as she didn't have the left eye. She feels blurry vision in the left eye. No pain. No visits with eye clinic.       Reviewed and updated as needed this visit by clinical staff  Tobacco  Allergies  Meds  Med Hx  Surg Hx  Fam Hx  Soc Hx      Reviewed and updated as needed this visit by Provider         ROS:  Constitutional, HEENT, cardiovascular, pulmonary, gi and gu systems are negative, except as otherwise noted.    OBJECTIVE:     /68   Pulse 102   Temp 98  F (36.7  C) (Oral)   Wt 64.9 kg (143 lb)   LMP 12/19/2018 (Approximate)   SpO2 100%   Breastfeeding? No   BMI 24.17 kg/m    Body mass index is 24.17 kg/m .  GENERAL: healthy, alert and no distress  EYES: Eyes grossly normal to inspection,conjunctivae and sclerae normal  HENT: nose and mouth without ulcers or lesions  PSYCH: mentation appears normal, affect normal    Diagnostic Test Results:  none     ASSESSMENT/PLAN:     1. Blurred vision  - advised to follow up in eye clinic for further evaluation    - OPHTHALMOLOGY ADULT REFERRAL    2. Bipolar affective disorder, currently depressed, moderate (H)  - Due to symptoms, I discussed that most likely pt would benefit from a mood stabilizer. I'd like to review her previous records and then send in script. Pt signed MIGUEL. We called clinic but it was a GYN clinic. We'll call her again to obtain psychiatrist information. I'll prescribe medication for one month and have pt follow up with psychiatrist.    - MENTAL HEALTH REFERRAL  - Adult; Psychiatry and Medication Management; Psychiatry; [facility]: [facility] Psychiatry Service [phone].  Medication management & future refills will be returned to G PCP upon completion of evaluation; We chetan...      Jose Luis Nava MD  [facility]

## 2018-12-26 NOTE — TELEPHONE ENCOUNTER
RN - Pt earlier signed MIGUEL for GYN office. Can you please ask her for previous psychiatrist information to obtain previous records.  NASIR

## 2018-12-27 ASSESSMENT — ANXIETY QUESTIONNAIRES: GAD7 TOTAL SCORE: 5

## 2018-12-27 NOTE — TELEPHONE ENCOUNTER
Message left for patient to return call to clinic and ask to speak to available triage nurse     Triage - patient signed an MIGUEL for previous psychiatry records however it was an OBGYN clinic when called not psychiatry   Dr. Nava would like to review past psych records     Yulia Garcia Registered Nurse   [facility]

## 2018-12-28 NOTE — TELEPHONE ENCOUNTER
Message left for patient to return call to clinic and ask to speak to available triage nurse     Yulia Garcia Registered Nurse   [facility]

## 2019-01-02 NOTE — TELEPHONE ENCOUNTER
"Dr. Nava-Please review and may close encounter.    Patient called and spoke with writer.    Per patient:  1. Will obtain psych records for Dr. Nava to review  2. Dr. Doshi at [facility] previously prescribed patient's Topamax and was the \"main\" provider who prescribed this medication  3. Psychiatrist prescribed ADHD medication    Patient called back and stated:  1. Dr. Matias psychiatrist, prescribed her ADHD and Restless Leg Syndrome medication.  Phone number: [phone]  2. SHANIA Peterson (located at [facility]), prescribed patient's depression medication during her last pregnancy     Thank you!  NEELAM AlmodovarN, RN    "

## 2019-01-09 ENCOUNTER — OFFICE VISIT (OUTPATIENT)
Dept: OPHTHALMOLOGY | Facility: CLINIC | Age: 44
End: 2019-01-09
Attending: OPTOMETRIST
Payer: COMMERCIAL

## 2019-01-09 DIAGNOSIS — H52.13 MYOPIA OF BOTH EYES: ICD-10-CM

## 2019-01-09 DIAGNOSIS — D31.32 CHOROIDAL NEVUS OF LEFT EYE: Primary | ICD-10-CM

## 2019-01-09 DIAGNOSIS — H04.123 DRY EYE SYNDROME OF BOTH EYES: ICD-10-CM

## 2019-01-09 PROCEDURE — 92015 DETERMINE REFRACTIVE STATE: CPT | Mod: ZF

## 2019-01-09 PROCEDURE — G0463 HOSPITAL OUTPT CLINIC VISIT: HCPCS | Mod: ZF

## 2019-01-09 ASSESSMENT — TONOMETRY
OS_IOP_MMHG: 21
OD_IOP_MMHG: 19
IOP_METHOD: TONOPEN

## 2019-01-09 ASSESSMENT — EXTERNAL EXAM - LEFT EYE: OS_EXAM: NORMAL

## 2019-01-09 ASSESSMENT — REFRACTION_MANIFEST
OD_AXIS: 005
OD_SPHERE: -2.25
OS_SPHERE: -2.00
OS_CYLINDER: +0.50
OD_CYLINDER: +1.00
OS_AXIS: 030

## 2019-01-09 ASSESSMENT — EXTERNAL EXAM - RIGHT EYE: OD_EXAM: NORMAL

## 2019-01-09 ASSESSMENT — VISUAL ACUITY
OS_SC: 20/40
OD_SC: 20/70
METHOD: SNELLEN - LINEAR
OS_PH_SC: 20/20
OD_PH_SC: 20/25

## 2019-01-09 ASSESSMENT — CONF VISUAL FIELD
OS_NORMAL: 1
OD_NORMAL: 1
METHOD: COUNTING FINGERS

## 2019-01-09 ASSESSMENT — SLIT LAMP EXAM - LIDS
COMMENTS: NORMAL
COMMENTS: NORMAL

## 2019-01-09 NOTE — NURSING NOTE
Patient presents for consultation for blurred vision referred by Dr. Elijah Joseph. Patient has a h/o contact lens wear, the eyes are blurred w.o contacts. She is noting a wandering LE intermittently, would like checked. The LE feels strained, no pain or discomfort, no redness or tears. No flashes or floaters. No glasses or CLS presents today. TIMBO 10yrs+/-. Alexandria Tang COT 9:12 AM January 9, 2019

## 2019-01-09 NOTE — PROGRESS NOTES
"HPI:  Patient complains of \"lazy eye\" in the left. She denies diplopia. There is eye strain but patient does not wear glasses. She was supposed to.       Pertinent Medical History:    Migraine    Alcohol abuse    Ocular History:    None    Patient is adopted.     Eye Medications:    None    Assessment and Plan:  1.   Dry Eye Syndrome, both eyes.    Start preservative free artificial tears QID both eyes.     2.   Myopia with astigmatism, both eyes.     Early presbyopia and warned. DVO for now. Return if there is still visual discomfort.     3.   Choroidal Nevus, left eye. New    Fundus photo planned today but patient left. Will re-schedule.     No elevation, no SRF, no orange pigment.    Due to new finding, repeat dilation and fundus photo of the left eye in 6 months.       Medical History:  Past Medical History:   Diagnosis Date     Alcohol abuse, in remission     Remission since 2003     ASCUS on Pap smear 5/16/11    HPV-pos. 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, in remission (H)     Since May 2001     Other, mixed, or unspecified nondependent drug abuse, in remission     In remission since 2003 (cocaine and ectasy)     Restless leg syndrome        Medications:  Current Outpatient Medications   Medication Sig Dispense Refill     buPROPion (WELLBUTRIN XL) 150 MG 24 hr tablet Take 150 mg by mouth       metroNIDAZOLE (FLAGYL) 500 MG tablet Take 1 tablet (500 mg) by mouth 2 times daily (Patient not taking: Reported on 12/26/2018) 14 tablet 0     Complete documentation of historical and exam elements from today's encounter can be found in the full encounter summary report (not reduplicated in this progress note). I personally obtained the chief complaint(s) and history of present illness.  I confirmed and edited as necessary the review of systems, past medical/surgical history, family " history, social history, and examination findings as documented by others; and I examined the patient myself. I personally reviewed the relevant tests, images, and reports as documented above. I formulated and edited as necessary the assessment and plan and discussed the findings and management plan with the patient and family. - Jodie Segundo, CHER  Chief Complaint(s) and History of Present Illness(es)     Blurred Vision Evaluation     In both eyes.  Onset was gradual.  Presenting in central vision.  Charactertized as  blurred vision.  Severity is mild.  Occurring constantly.  It is worse throughout the day.  Context:  distance vision.  Since onset it is gradually worsening.  Associated symptoms include Negative for redness, eye pain, floaters, flashes and tearing.  Treatments tried include no treatments.  Response to treatment was no improvement.  Pain was noted as 0/10.              Comments     Patient presents for consultation for blurred vision referred by Dr. Elijah Joseph. Patient has a h/o contact lens wear, the eyes are blurred w.o contacts. She is noting a wandering LE intermittently, would like checked. The LE feels strained, no pain or discomfort, no redness or tears. No flashes or floaters. No glasses or CLS presents today. TIMBO 10yrs+/-. Alexandria Tang COT 9:12 AM January 9, 2019

## 2019-01-21 ENCOUNTER — OFFICE VISIT (OUTPATIENT)
Dept: PSYCHIATRY | Facility: CLINIC | Age: 44
End: 2019-01-21
Payer: COMMERCIAL

## 2019-01-21 VITALS
OXYGEN SATURATION: 99 % | DIASTOLIC BLOOD PRESSURE: 78 MMHG | RESPIRATION RATE: 16 BRPM | TEMPERATURE: 97.7 F | BODY MASS INDEX: 23.66 KG/M2 | HEART RATE: 110 BPM | SYSTOLIC BLOOD PRESSURE: 135 MMHG | WEIGHT: 140 LBS

## 2019-01-21 DIAGNOSIS — F19.90 SUBSTANCE USE DISORDER: ICD-10-CM

## 2019-01-21 DIAGNOSIS — F60.3 BORDERLINE PERSONALITY DISORDER (H): ICD-10-CM

## 2019-01-21 DIAGNOSIS — F31.32 BIPOLAR AFFECTIVE DISORDER, CURRENTLY DEPRESSED, MODERATE (H): Primary | ICD-10-CM

## 2019-01-21 LAB
BASOPHILS # BLD AUTO: 0 10E9/L (ref 0–0.2)
BASOPHILS NFR BLD AUTO: 0.3 %
DIFFERENTIAL METHOD BLD: NORMAL
EOSINOPHIL # BLD AUTO: 0.2 10E9/L (ref 0–0.7)
EOSINOPHIL NFR BLD AUTO: 1.5 %
ERYTHROCYTE [DISTWIDTH] IN BLOOD BY AUTOMATED COUNT: 15 % (ref 10–15)
HCT VFR BLD AUTO: 43.7 % (ref 35–47)
HGB BLD-MCNC: 13.9 G/DL (ref 11.7–15.7)
LYMPHOCYTES # BLD AUTO: 2.1 10E9/L (ref 0.8–5.3)
LYMPHOCYTES NFR BLD AUTO: 19.5 %
MCH RBC QN AUTO: 29 PG (ref 26.5–33)
MCHC RBC AUTO-ENTMCNC: 31.8 G/DL (ref 31.5–36.5)
MCV RBC AUTO: 91 FL (ref 78–100)
MONOCYTES # BLD AUTO: 0.8 10E9/L (ref 0–1.3)
MONOCYTES NFR BLD AUTO: 7.6 %
NEUTROPHILS # BLD AUTO: 7.8 10E9/L (ref 1.6–8.3)
NEUTROPHILS NFR BLD AUTO: 71.1 %
PLATELET # BLD AUTO: 286 10E9/L (ref 150–450)
RBC # BLD AUTO: 4.8 10E12/L (ref 3.8–5.2)
WBC # BLD AUTO: 11 10E9/L (ref 4–11)

## 2019-01-21 PROCEDURE — 80053 COMPREHEN METABOLIC PANEL: CPT | Performed by: NURSE PRACTITIONER

## 2019-01-21 PROCEDURE — 85025 COMPLETE CBC W/AUTO DIFF WBC: CPT | Performed by: NURSE PRACTITIONER

## 2019-01-21 PROCEDURE — 36415 COLL VENOUS BLD VENIPUNCTURE: CPT | Performed by: NURSE PRACTITIONER

## 2019-01-21 PROCEDURE — 99203 OFFICE O/P NEW LOW 30 MIN: CPT | Performed by: NURSE PRACTITIONER

## 2019-01-21 RX ORDER — FLUCONAZOLE 150 MG/1
TABLET ORAL
Refills: 0 | COMMUNITY
Start: 2018-11-01 | End: 2019-02-14

## 2019-01-21 RX ORDER — TOPIRAMATE 50 MG/1
50 TABLET, FILM COATED ORAL DAILY
Qty: 30 TABLET | Refills: 1 | Status: SHIPPED | OUTPATIENT
Start: 2019-01-21 | End: 2019-07-10

## 2019-01-21 ASSESSMENT — ANXIETY QUESTIONNAIRES
7. FEELING AFRAID AS IF SOMETHING AWFUL MIGHT HAPPEN: NOT AT ALL
5. BEING SO RESTLESS THAT IT IS HARD TO SIT STILL: MORE THAN HALF THE DAYS
4. TROUBLE RELAXING: SEVERAL DAYS
7. FEELING AFRAID AS IF SOMETHING AWFUL MIGHT HAPPEN: NOT AT ALL
2. NOT BEING ABLE TO STOP OR CONTROL WORRYING: SEVERAL DAYS
GAD7 TOTAL SCORE: 10
1. FEELING NERVOUS, ANXIOUS, OR ON EDGE: MORE THAN HALF THE DAYS
6. BECOMING EASILY ANNOYED OR IRRITABLE: MORE THAN HALF THE DAYS
GAD7 TOTAL SCORE: 10
3. WORRYING TOO MUCH ABOUT DIFFERENT THINGS: MORE THAN HALF THE DAYS
GAD7 TOTAL SCORE: 10

## 2019-01-21 ASSESSMENT — PATIENT HEALTH QUESTIONNAIRE - PHQ9
10. IF YOU CHECKED OFF ANY PROBLEMS, HOW DIFFICULT HAVE THESE PROBLEMS MADE IT FOR YOU TO DO YOUR WORK, TAKE CARE OF THINGS AT HOME, OR GET ALONG WITH OTHER PEOPLE: VERY DIFFICULT
SUM OF ALL RESPONSES TO PHQ QUESTIONS 1-9: 15
SUM OF ALL RESPONSES TO PHQ QUESTIONS 1-9: 15

## 2019-01-21 NOTE — PROGRESS NOTES
"                                                         Outpatient Psychiatric Evaluation- Standard  Adult    Name:  Maria Esther Haro  : 1975    Source of Referral:  Primary Care Provider: Jose Luis Nava   Last visit: 2018  Current Psychotherapist: None   Last visit: intrested    * Patient late to appointment. There was not adequate time for paperwork to be complete.    Identifying Data:  Patient is a 43 year old, single  White American female  who presents for initial visit with me.  Patient is currently unemployed. Patient attended the session alone. Consent to communicate signed for no-one. Consent for treatment signed and included in electronic medical record. Discussed limits of confidentiality today. My Practice Policy was reviewed and signed.     Patient prefers to be called: \"Maria Esther\"      Chief Complaint:    Patient reports: \"I need to get back on my medication.\"      HPI:    Patient reports an original diagnosis of borderline personality disorder secondary to MMPI performed at age 24. States over years diagnosis morphed to bipolar disorder. Patient states her psychiatric treatment has largley been managed by PCP's over the years. Cites Dr. Doshi, an IM physician at [facility], as her previous chief PCP, but patient has since moved so no longer attends this clinic.    Was on Depakote for 2-3 years until she got pregnant in  and had this discontinued. Thinks Depakote helped, but gained weight. Was switched Topamax; additionally prescribed Wellbutrin and Requip for RLS. Patient states, with regard to efficacy of Topamax, \"I didn't know a difference, but everyone else did\" in terms of mood stability. Denies any recalled side effects with Topamax or Wellbutrin. States she lapsed on these medications in .    Denies any previous long-term psychiatry. Saw a psychiatrist briefly some number of years ago and was prescribed Ritalin. States she did not want to take a stimulant. She denies " "any past inpatient psychiatric hospitalizations.    Patient more recently spoke to gynecologist and new PCP at [facility] about restarting mood stabilizer, and she was referred to writer.    Patient endorsing easy mood shifts and \"lack of coping skills\". Denies denies SI.    Patient is upfront about her substance use problem today; \"It's in the chart anyway\". 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 Patient had 30-day residential treatment at [facility] in 2009; returned to [place] after residential and relapsed on use. Later in 2009 found out she was pregnant, so stopped using successfully for 8 months. Relapsed sometime shortly after giving birth.    Patient states GHB primary drug of choice; uses first in morning and through the day; states it helps with meth withdrawal and lifts her mood. States she does not drive under influence. Can get irritable and into verbal altercations when coming down from GHB. Uses meth daily \"but not as much\" as she used to. States her substance use \"is getting old\"; \"I'm not ready to quit, but I'm getting closer\". She states she's had 3-4 overdoses from GHB, the last being in 02/2018. She denies any alcohol or other substance use.    Patient living in [facility] [facility]. States her house and car are paid for. Lives by self. 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 She has 3 grown daughters; 2 living with their father and 1 living with patient's parents in [place]. She has little to no contact with them.        Psychiatric Review of Symptoms:     PHQ-9 scores:   PHQ-9 SCORE 7/25/2017 12/26/2018 1/21/2019   PHQ-9 Total Score - - -   PHQ-9 Total Score MyChart - - 15 (Moderately severe depression)   PHQ-9 Total Score 5 11 15        STEVEN-7 scores:    STEVEN-7 SCORE 7/25/2017 12/26/2018 1/21/2019   Total Score - - 10 (moderate anxiety)   Total Score 7 5 10     Answers for HPI/ROS submitted by the patient on 1/21/2019 "   If you checked off any problems, how difficult have these problems made it for you to do your work, take care of things at home, or get along with other people?: Very difficult  PHQ9 TOTAL SCORE: 15  STEVEN 7 TOTAL SCORE: 10        Psychiatric History:   No psychiatric hospitalizations  No suicide attempts      Substance Use History:  See above.      Past Medical History:  Past Medical History:   Diagnosis Date     Alcohol abuse, in remission     Remission since      ASCUS on Pap smear 11    HPV-pos. 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, in remission (H)     Since May 2001     Other, mixed, or unspecified nondependent drug abuse, in remission     In remission since  (cocaine and ectasy)     Restless leg syndrome       Surgery:   Past Surgical History:   Procedure Laterality Date     C ANESTH,SURG BREAST RECONSTRUCTIVE      Breast augmentation     C NONSPECIFIC PROCEDURE   &      x 3, previous tear in cervix     Allergies:     Allergies   Allergen Reactions     Acetaminophen Itching     No Clinical Screening - See Comments      Other reaction(s): Unknown     Oxycodone Itching     Oxycontin [Oxycodone Hcl] Itching     Percocet [Oxycodone-Acetaminophen] Itching     Vicodin [Acetaminophen] Itching     Primary Care Provider: [facility]  Seizures or Head Injury: No    Current Medications:    Current Outpatient Medications:      fluconazole (DIFLUCAN) 150 MG tablet, , Disp: , Rfl: 0    The [place] Prescription Monitoring Program has been reviewed and there are no concerns about diversionary activity for controlled substances at this time.     Vital Signs:  Vitals: /78 (BP Location: Right arm, Patient Position: Chair, Cuff Size: Adult Regular)   Pulse 110   Temp 97.7  F (36.5  C) (Oral)   Resp 16   Wt 63.5 kg (140 lb)   LMP 2019  (Approximate)   SpO2 99%   Breastfeeding? No   BMI 23.66 kg/m      Labs:  Most recent labs reviewed and no new labs.     Review of Systems:  10 systems (general, cardiovascular, respiratory, eyes, ENT, endocrine, GI, , M/S, neurological) were reviewed. Most pertinent finding(s) is/are: substance dependence. The remaining systems are all unremarkable.    Family History:   Patient reported family history includes:   Family History   Adopted: Yes   Problem Relation Age of Onset     Unknown/Adopted No family hx of         Pt is adopted     Macular Degeneration No family hx of      Glaucoma No family hx of      Patient adopted; does not no bio family medical history.    Social History:   Needs further eval, but see above for basic details.      Mental Status Examination:     Appearance:  awake, alert and adequately groomed  Attitude:  cooperative   Eye Contact:  adequate  Gait and Station: Normal  Psychomotor Behavior:  intact station, gait and muscle tone  Oriented to:  time, person, and place  Attention Span and Concentration:  Normal  Speech:  clear, coherent  Mood:  sad   Affect:  appropriate and in normal range  Associations:  no loose associations  Thought Process:  logical, linear and goal oriented  Thought Content:  Appropriate to Interview  Recent and Remote Memory:  intact Not formally assessed. No amnesia.  Fund of Knowledge: appropriate  Insight:  good  Judgment:  intact  Impulse Control:  intact    Suicide Risk Assessment:  Today Maria Esther Haro denies suicidal ideation or self-harm impulses, but remains at elevated risk of overdose secondary to substance dependence. Therefore, based on all available evidence including the factors cited above, Maria Esther Haro does not appear to be at imminent risk for self-harm, does not meet criteria for a 72-hr hold, and therefore remains appropriate for ongoing outpatient level of care.  A thorough assessment of risk factors related to suicide and self-harm have  "been reviewed and are noted above. The patient convincingly denies acute suicidality on several occasions. Local community safety resources reviewed and printed for patient to use if needed. There was no deceit detected, and the patient presented in a manner that was believable.     DSM5  Diagnosis:  Other Unspecified and Specified Bipolar and Related Disorder 296.80 (F31.9) Unspecified Bipolar and Related Disorder  Substance-Related & Addictive Disorders Sedative, Hypnotic or Anxiolytic Related Disorder, .   304.10 (F13.20) Moderate  301.83 (F60.3) Borderline Personality Disorder    Medical Comorbidities Include:   Patient Active Problem List    Diagnosis Date Noted     HSV-2 (herpes simplex virus 2) infection 10/31/2018     Priority: Medium     Diagnosed at 20         ASCUS on Pap smear 05/16/2011     Priority: Medium     5/16/11 Pap - Ascus, + HPV 83 . Unable to assign carcinogenicity. Pt to have repeat pap done in one year.(5/12) Type 83 considered low risk (10/2018)  09/21/12 Pt. Has not responded to telephone calls, letters, or certified letters, message sent to Usman Sousa for Pt. To be put in the inactive file.  09/26/12 Per Usman Sousa, pt. Put in the inactive file for pap tracking due to no response from pt., episode resolved.  02/10/16 DX NL pap, neg HPV, pt. Is to have co-testing in one year.  04/26/18 Patient is lost to follow-up.   10/31/18 NIL pap, neg HR HPV. Plan 1 year cotest - if normal, can go to routine screening         Bipolar affective disorder (H) 08/23/2010     Priority: Medium     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      Priority: Medium     Since May 2009. However relapsed 10\"09.  Problem list name updated by automated process. Provider to review       Restless leg syndrome      Priority: Medium     Mild major depression (H)      Priority: Medium     Alcohol abuse, in remission      Priority: Medium     Remission since 2003         A 12-item WHODAS 2.0 " assessment was completed by the patient today and recorded in EPIC.    WHODAS 2.0 Total Score 1/21/2019   Total Score 32   Total Score MyChart 32       The Patient Activation Measure (KUSUM) score was completed and recorded in [employer]. This assesses patient knowledge, skill, and confidence for self-management.   KUSUM Score (Last Two) 8/23/2010   KUSUM Raw Score 46   Activation Score 75.3   KUSUM Level 4                Impression:  Maria Esther Haro is a 43 year old female with past diagnoses of bipolar disorder and borderline personality disorder. She has been dependent on GHB and methamphetamine for many years, with a number of overdoses from GHB, and only one 30-day residential treatment 9 years ago. She is presently contemplative about seeking recovery from her substance use, and is declining any treatment referral today. I don't have any objections to restarting Topamax, which may help with mood stability and substance use impulses, but I make clear that she may not see desired therapeutic effects while she continues to use GHB and methamphetamine. I am remiss to start Wellbutrin, given potential to lower seizure threshold. Given lack of social supports and an established medical team, she should be continued to be engaged in her readiness and motivation for substance use recovery. She is deferring on psychotherapy/counseling at this time.    Medication side effects and alternatives reviewed. Health promotion activities recommended and reviewed today. All questions addressed. Education and counseling completed regarding risks and benefits of medications and psychotherapy options. Collaborative Care Psychiatry Service model reviewed today. Recommend therapy for additional support.     Treatment Plan:    Start Topamax: 25 mg nightly for 3 nights, then increase to 50 mg nightly    Continue all other medical directions per primary care provider.     Continue all other medications as reviewed per electronic medical record  today.     Safety plan reviewed. To the Emergency Department as needed or call after hours crisis line at [phone] or [phone]. [place] Crisis Text Line: Text [place] to 396370  or  Suicide LifeLine Chat: suicidepreQuantuModeling.org/chat/    Schedule an appointment with me in 4 weeks or sooner as needed.  Call [facility] at [phone] to schedule.    Follow up with primary care provider as planned or for acute medical concerns.    Call the psychiatric nurse line with medication questions or concerns at [phone].    [employer] may be used to communicate with your provider, but this is not intended to be used for emergencies.      Community Resources:    National Suicide Prevention Lifeline: 534.859.1289 (TTY: 649.909.6951). Call anytime for help.  (www.suicidepreventionlifeline.org)  National Fence on Mental Illness (www.anthony.org): 353.212.8085 or 846-372-4105.   Mental Health Association (www.mentalhealth.org): 532.820.6810 or 458-133-0840.  [place] Crisis Text Line: Text [place] to 063626  Suicide LifeLine Chat: suicideMascotaNube.org/chat    Administrative Billing:   Time spent with patient was 60 minutes and greater than 50% of time or 40 minutes was spent in counseling and coordination of care regarding above diagnoses and treatment plan.    Patient Status:  Patient will continue to be seen for ongoing consultation and stabilization.    Signed:   Tripp Thomas, CNP  Psychiatry

## 2019-01-22 LAB
ALBUMIN SERPL-MCNC: 3.9 G/DL (ref 3.4–5)
ALP SERPL-CCNC: 78 U/L (ref 40–150)
ALT SERPL W P-5'-P-CCNC: 19 U/L (ref 0–50)
ANION GAP SERPL CALCULATED.3IONS-SCNC: 8 MMOL/L (ref 3–14)
AST SERPL W P-5'-P-CCNC: 20 U/L (ref 0–45)
BILIRUB SERPL-MCNC: 0.3 MG/DL (ref 0.2–1.3)
BUN SERPL-MCNC: 9 MG/DL (ref 7–30)
CALCIUM SERPL-MCNC: 9.4 MG/DL (ref 8.5–10.1)
CHLORIDE SERPL-SCNC: 107 MMOL/L (ref 94–109)
CO2 SERPL-SCNC: 24 MMOL/L (ref 20–32)
CREAT SERPL-MCNC: 0.68 MG/DL (ref 0.52–1.04)
GFR SERPL CREATININE-BSD FRML MDRD: >90 ML/MIN/{1.73_M2}
GLUCOSE SERPL-MCNC: 84 MG/DL (ref 70–99)
POTASSIUM SERPL-SCNC: 3.8 MMOL/L (ref 3.4–5.3)
PROT SERPL-MCNC: 7.7 G/DL (ref 6.8–8.8)
SODIUM SERPL-SCNC: 139 MMOL/L (ref 133–144)

## 2019-01-22 ASSESSMENT — ANXIETY QUESTIONNAIRES: GAD7 TOTAL SCORE: 10

## 2019-01-24 NOTE — MR AVS SNAPSHOT
After Visit Summary   5/4/2018    Maria Esther Haro    MRN: [id number]           Patient Information     Date Of Birth          1975        Visit Information        Provider Department      5/4/2018 1:40 PM Bhavya Pavon PA-C [facility]        Today's Diagnoses     Impetigo    -  1      Care Instructions      Understanding Impetigo  Impetigo is a common bacterial infection of the skin. It most often affects the face, arms, and legs. But it can appear on any part of the body. Anyone can have it, regardless of age. But it is most common in children. Impetigo is very contagious. This means it spreads easily to other people.  How to say it  [id number]   What causes impetigo?  Many types of bacteria live on normal, healthy skin. The bacteria usually don t cause problems. Impetigo happens when bacteria enter the skin through a scratch, break, sore, bite, or irritated spot. They then begin to grow out of control, leading to infection. There are two types of staphylococcus bacteria that cause impetigo. In certain cases, impetigo appears on skin that has no visible break. It may be more likely to occur on skin that has another skin problem, such as eczema. It may also be more common after a cold or other virus.  Symptoms of impetigo  Symptoms of this problem include:    Small, fluid-filled blisters on the skin that may itch, ooze, or crust    A yellow, honey-colored crust on the infected skin    Skin sores that spread with scratching    An itchy rash that spreads with scratching    Swollen lymph nodes  Treatment for impetigo  The goal is to treat the infection and prevent it from spreading to others.    You will likely be given an antibiotic to treat the infection. This may be a cream or ointment called muporicin to put on your skin. If the infection is severe or spreading, you may be given antibiotic medicine to take by mouth. Be sure to use this medicine as directed.  Do not stop using it until you are told to stop, even if your skin gets better. If you stop too soon, the infection may come back and be harder to treat.    Avoid scratching or picking at your sores. It may help to cover affected areas with a bandage.    To prevent spreading the infection, wash your hands often. Avoid sharing personal items, towels, clothes, pillows, and sheets with others. After each use, wash these items in hot water.    Clean the affected skin several times a day. Don t scrub. Instead, soak the area in warm, soapy water. This will help remove the crust that forms. For places that you can't soak, such as the face, place a clean, warm (not hot) washcloth on the affected area. Use a new washcloth and towel each time.  When to call your healthcare provider  Call your healthcare provider right away if you have any of these:    Fever of 100.4 F (38 C) or higher, or as directed    Increasing number of sores or spreading areas of redness after 2 days of treatment with antibiotics    Increasing swelling or pain    Increased amounts of fluid or pus coming from the sores    Unusual drowsiness, weakness, or change in behavior    Loss of appetite or vomiting   Date Last Reviewed: 5/1/2016 2000-2017 [employer]. [address] [place] [address]. All rights reserved. This information is not intended as a substitute for professional medical care. Always follow your healthcare professional's instructions.                Follow-ups after your visit        Who to contact     If you have questions or need follow up information about today's clinic visit or your schedule please contact [facility] directly at [phone].  Normal or non-critical lab and imaging results will be communicated to you by MyChart, letter or phone within 4 business days after the clinic has received the results. If you do not hear from us within 7 days, please contact the clinic through  "MyChart or phone. If you have a critical or abnormal lab result, we will notify you by phone as soon as possible.  Submit refill requests through [employer] or call your pharmacy and they will forward the refill request to us. Please allow 3 business days for your refill to be completed.          Additional Information About Your Visit        [employer] Information     [employer] gives you secure access to your electronic health record. If you see a primary care provider, you can also send messages to your care team and make appointments. If you have questions, please call your primary care clinic.  If you do not have a primary care provider, please call [phone] and they will assist you.        Care EveryWhere ID     This is your Care EveryWhere ID. This could be used by other organizations to access your [facility] medical records  [id number]        Your Vitals Were     Pulse Temperature Respirations Height BMI (Body Mass Index)       92 98  F (36.7  C) (Oral) 16 5' 4.5\" (1.638 m) 24.42 kg/m2        Blood Pressure from Last 3 Encounters:   05/04/18 140/90   07/25/17 112/66   07/11/16 100/69    Weight from Last 3 Encounters:   05/04/18 144 lb 8 oz (65.5 kg)   07/25/17 137 lb (62.1 kg)   07/11/16 135 lb (61.2 kg)              Today, you had the following     No orders found for display         Today's Medication Changes          These changes are accurate as of 5/4/18  1:51 PM.  If you have any questions, ask your nurse or doctor.               Start taking these medicines.        Dose/Directions    mupirocin 2 % cream   Commonly known as:  BACTROBAN   Used for:  Impetigo   Started by:  Bhavya Pavon PA-C        Apply topically 3 times daily for 7 days   Quantity:  15 g   Refills:  0            Where to get your medicines      These medications were sent to [facility] - [place], [place] - [address] AT [facility] [address] [facility] [address]     Phone:  " [phone]     mupirocin 2 % cream                Primary Care Provider Office Phone # Fax #    [facility] [phone] [address] [address]        Equal Access to Services     BROOKE GONZALEZ : Hadii alex ku hadjaimeo Soomaali, waaxda luqadaha, qaybta kaalmada adeegyada, waxthomas idiin hayaan adekelly arthur sima mccarthy. So [facility] [phone].    ATENCIÓN: Si habla español, tiene a figueroa disposición servicios gratuitos de asistencia lingüística. Llame al [phone].    We comply with applicable federal civil rights laws and [place] laws. We do not discriminate on the basis of race, color, national origin, age, disability, sex, sexual orientation, or gender identity.            Thank you!     Thank you for choosing [facility]  for your care. Our goal is always to provide you with excellent care. Hearing back from our patients is one way we can continue to improve our services. Please take a few minutes to complete the written survey that you may receive in the mail after your visit with us. Thank you!             Your Updated Medication List - Protect others around you: Learn how to safely use, store and throw away your medicines at www.disposemymeds.org.          This list is accurate as of 5/4/18  1:51 PM.  Always use your most recent med list.                   Brand Name Dispense Instructions for use Diagnosis    buPROPion 150 MG 24 hr tablet    WELLBUTRIN XL     Take 150 mg by mouth        mupirocin 2 % cream    BACTROBAN    15 g    Apply topically 3 times daily for 7 days    Impetigo       REQUIP PO           TOPAMAX 50 MG tablet   Generic drug:  topiramate     60 Tab    1 TABLET DAILY at bedtime    Bipolar affective disorder (H), Mild major depression (H)          24-Jan-2019 15:17

## 2019-02-02 ENCOUNTER — MYC REFILL (OUTPATIENT)
Dept: PSYCHIATRY | Facility: CLINIC | Age: 44
End: 2019-02-02

## 2019-02-02 ENCOUNTER — E-VISIT (OUTPATIENT)
Dept: FAMILY MEDICINE | Facility: CLINIC | Age: 44
End: 2019-02-02
Payer: COMMERCIAL

## 2019-02-02 DIAGNOSIS — R69 DIAGNOSIS DEFERRED: Primary | ICD-10-CM

## 2019-02-02 DIAGNOSIS — F31.32 BIPOLAR AFFECTIVE DISORDER, CURRENTLY DEPRESSED, MODERATE (H): ICD-10-CM

## 2019-02-02 RX ORDER — TOPIRAMATE 50 MG/1
50 TABLET, FILM COATED ORAL DAILY
Qty: 30 TABLET | Refills: 1 | Status: CANCELLED | OUTPATIENT
Start: 2019-02-02

## 2019-02-04 NOTE — TELEPHONE ENCOUNTER
"According to Tripp's plan/notes from 1/21/19, patient is supposed to be seen 4 weeks or sooner as needed. Patient does not have a future appointment scheduled.     Writer left patient a voicemail asking her to call us back regarding the issue. We will need to know how much she has been/is taking in order to determine if a taper is needed to stop the Topamax. She will also need to schedule and appt with Tripp to discuss other medication options.    Also sent a MyChart reply as follows:  \"How much Topamax are you currently taking? We need to know in order discuss how to stop taking it.  In order to start a new prescription, you will need to schedule a visit with Tripp.   Please call [phone] to discuss stopping Topamax and to schedule a visit with Tripp.  (I also left a voicemail asking you to call us)\"    Waiting for more information from patient in order to move forward with next steps.    Carrie Best RN on 2/4/2019 at 11:09 AM    "

## 2019-02-05 ENCOUNTER — OFFICE VISIT (OUTPATIENT)
Dept: FAMILY MEDICINE | Facility: CLINIC | Age: 44
End: 2019-02-05
Payer: COMMERCIAL

## 2019-02-05 ENCOUNTER — TELEPHONE (OUTPATIENT)
Dept: FAMILY MEDICINE | Facility: CLINIC | Age: 44
End: 2019-02-05

## 2019-02-05 VITALS
BODY MASS INDEX: 22.9 KG/M2 | WEIGHT: 135.5 LBS | HEART RATE: 104 BPM | SYSTOLIC BLOOD PRESSURE: 109 MMHG | DIASTOLIC BLOOD PRESSURE: 69 MMHG | TEMPERATURE: 97.8 F | OXYGEN SATURATION: 99 %

## 2019-02-05 DIAGNOSIS — R10.2 PELVIC PAIN IN FEMALE: Primary | ICD-10-CM

## 2019-02-05 DIAGNOSIS — R82.90 NONSPECIFIC FINDING ON EXAMINATION OF URINE: ICD-10-CM

## 2019-02-05 DIAGNOSIS — T36.95XA ANTIBIOTIC-INDUCED YEAST INFECTION: ICD-10-CM

## 2019-02-05 DIAGNOSIS — R11.0 NAUSEA: ICD-10-CM

## 2019-02-05 DIAGNOSIS — N30.01 ACUTE CYSTITIS WITH HEMATURIA: ICD-10-CM

## 2019-02-05 DIAGNOSIS — B37.9 ANTIBIOTIC-INDUCED YEAST INFECTION: ICD-10-CM

## 2019-02-05 LAB
ALBUMIN UR-MCNC: NEGATIVE MG/DL
APPEARANCE UR: CLEAR
BACTERIA #/AREA URNS HPF: ABNORMAL /HPF
BILIRUB UR QL STRIP: NEGATIVE
COLOR UR AUTO: YELLOW
GLUCOSE UR STRIP-MCNC: NEGATIVE MG/DL
HGB UR QL STRIP: ABNORMAL
KETONES UR STRIP-MCNC: NEGATIVE MG/DL
LEUKOCYTE ESTERASE UR QL STRIP: NEGATIVE
NITRATE UR QL: POSITIVE
NON-SQ EPI CELLS #/AREA URNS LPF: ABNORMAL /LPF
PH UR STRIP: 6.5 PH (ref 5–7)
RBC #/AREA URNS AUTO: ABNORMAL /HPF
SOURCE: ABNORMAL
SP GR UR STRIP: 1.02 (ref 1–1.03)
SPECIMEN SOURCE: NORMAL
UROBILINOGEN UR STRIP-ACNC: 0.2 EU/DL (ref 0.2–1)
WBC #/AREA URNS AUTO: ABNORMAL /HPF
WET PREP SPEC: NORMAL

## 2019-02-05 PROCEDURE — 87088 URINE BACTERIA CULTURE: CPT | Performed by: FAMILY MEDICINE

## 2019-02-05 PROCEDURE — 81001 URINALYSIS AUTO W/SCOPE: CPT | Performed by: FAMILY MEDICINE

## 2019-02-05 PROCEDURE — 87210 SMEAR WET MOUNT SALINE/INK: CPT | Performed by: FAMILY MEDICINE

## 2019-02-05 PROCEDURE — 87086 URINE CULTURE/COLONY COUNT: CPT | Performed by: FAMILY MEDICINE

## 2019-02-05 PROCEDURE — 99214 OFFICE O/P EST MOD 30 MIN: CPT | Performed by: FAMILY MEDICINE

## 2019-02-05 PROCEDURE — 87186 SC STD MICRODIL/AGAR DIL: CPT | Performed by: FAMILY MEDICINE

## 2019-02-05 RX ORDER — FLUCONAZOLE 150 MG/1
150 TABLET ORAL ONCE
Qty: 1 TABLET | Refills: 0 | Status: SHIPPED | OUTPATIENT
Start: 2019-02-05 | End: 2019-02-14

## 2019-02-05 RX ORDER — CIPROFLOXACIN 250 MG/1
250 TABLET, FILM COATED ORAL 2 TIMES DAILY
Qty: 6 TABLET | Refills: 0 | Status: SHIPPED | OUTPATIENT
Start: 2019-02-05 | End: 2019-02-14

## 2019-02-05 NOTE — PROGRESS NOTES
cip  SUBJECTIVE:   Maria Esther Haro is a 43 year old female who presents to clinic today for the following health issues:    She had a recent yeast infection tx with OTC medication. She then got a bad cold along with tingling. She then felt nauseous and slept three days straight. Cold symptoms are improving.   She is using meth and THC and stopped as she hasn't gotten high of drugs due to illness .  At night she is having pelvic cramping, nausea,    She endorses clear vaginal discharge and intermittent chills.   Urine dark for two months.   This morning she had one loose stool.   No back pain, dysuria, hematuria, vomiting, fever or dyspareunia.   LMP around 2 weeks ago.   She had chlamydia in Nov 2018 which was treated.   She is an escort and uses protection. She has unprotected intercourse with only one partner.       Problem list and histories reviewed & adjusted, as indicated.  Additional history: as documented    Labs reviewed in EPIC    Reviewed and updated as needed this visit by clinical staff       Reviewed and updated as needed this visit by Provider         ROS:  Constitutional, HEENT, cardiovascular, pulmonary, gi and gu systems are negative, except as otherwise noted.    OBJECTIVE:     /69   Pulse 104   Temp 97.8  F (36.6  C) (Oral)   Wt 61.5 kg (135 lb 8 oz)   LMP 01/18/2019 (Approximate)   SpO2 99%   BMI 22.90 kg/m    Body mass index is 22.9 kg/m .  GENERAL: healthy, alert and no distress  EYES: Eyes grossly normal to inspection  HENT: ear canals and TM's normal, nose and mouth without ulcers or lesions  RESP: lungs clear to auscultation - no rales, rhonchi or wheezes  CV: regular rate and rhythm, normal S1 S2  ABDOMEN: soft, + periumbilical tender, no hepatosplenomegaly, no masses and bowel sounds normal, no guarding, no rigidity     Diagnostic Test Results:  Results for orders placed or performed in visit on 02/05/19 (from the past 24 hour(s))   Wet prep   Result Value Ref Range     Specimen Description Vagina     Wet Prep No Trichomonas seen     Wet Prep No clue cells seen     Wet Prep No yeast seen    *UA reflex to Microscopic and Culture ([facility] (except [place] and [place])   Result Value Ref Range    Color Urine Yellow     Appearance Urine Clear     Glucose Urine Negative NEG^Negative mg/dL    Bilirubin Urine Negative NEG^Negative    Ketones Urine Negative NEG^Negative mg/dL    Specific Gravity Urine 1.020 1.003 - 1.035    Blood Urine Trace (A) NEG^Negative    pH Urine 6.5 5.0 - 7.0 pH    Protein Albumin Urine Negative NEG^Negative mg/dL    Urobilinogen Urine 0.2 0.2 - 1.0 EU/dL    Nitrite Urine Positive (A) NEG^Negative    Leukocyte Esterase Urine Negative NEG^Negative    Source Midstream Urine    Urine Microscopic   Result Value Ref Range    WBC Urine 5-10 (A) OTO5^0 - 5 /HPF    RBC Urine 2-5 (A) OTO2^O - 2 /HPF    Squamous Epithelial /LPF Urine Moderate (A) FEW^Few /LPF    Bacteria Urine Many (A) NEG^Negative /HPF       ASSESSMENT/PLAN:       1. Pelvic pain in female  - ordered below for further evaluation, tx as indicated   - Wet prep  - *UA reflex to Microscopic and Culture ([facility] (except [place] and [place])  - NEISSERIA GONORRHOEA PCR  - CHLAMYDIA TRACHOMATIS PCR  - Urine Microscopic    2. Nausea  - Wet prep  - *UA reflex to Microscopic and Culture ([facility] (except [place] and [place])  - NEISSERIA GONORRHOEA PCR  - CHLAMYDIA TRACHOMATIS PCR    3. Nonspecific finding on examination of urine  - Urine Culture Aerobic Bacterial pending    4. Acute cystitis with hematuria  - pending UC  - ciprofloxacin (CIPRO) 250 MG tablet; Take 1 tablet (250 mg) by mouth 2 times daily for 3 days  Dispense: 6 tablet; Refill: 0    5. Antibiotic-induced yeast infection  - fluconazole (DIFLUCAN) 150 MG tablet; Take 1 tablet (150 mg) by mouth once for 1 dose  Dispense: 1 tablet; Refill: 0      Jose Luis Nava MD  [facility]  [facility]

## 2019-02-05 NOTE — TELEPHONE ENCOUNTER
Received a call from the  that this patients GC/Chlamydia test sample leaked in transit, so unable to be analyzed.  It was a self collect, and they said it was punctured through the top, and we didn't notice it.  A VM message was left for the patient to call us back without leaving any details.    Thanks,    [place] [facility]

## 2019-02-05 NOTE — PATIENT INSTRUCTIONS
Wet prep was negative  Chlamydia and gonorrhea pending  Urine test concerning for urinary tract infection. Please start Ciprofloxacin 250 mg twice daily for 3 days.   You can take fluconazole 150 mg X 1, if you experience yeast symptoms.   Follow if symptoms worsen or fail to improve.

## 2019-02-05 NOTE — TELEPHONE ENCOUNTER
RN, can you please schedule patient an office visit. The e-visit was from a few days ago when I was out of the office. If she has already been seen then she wouldn't need to see me for symptoms.   DM

## 2019-02-06 ENCOUNTER — TELEPHONE (OUTPATIENT)
Dept: FAMILY MEDICINE | Facility: CLINIC | Age: 44
End: 2019-02-06

## 2019-02-06 NOTE — TELEPHONE ENCOUNTER
Prior Authorization Retail Medication Request    Medication/Dose: Fluconazole  ICD code (if different than what is on RX):  Previously Tried and Failed:  Rationale:    Insurance Name: unknown  Insurance ID: unknown    Pharmacy Information (if different than what is on RX)  Name: [facility]   Phone: [phone]     Please include previous medications tried and failed.  Please ask insurance for medications on formulary.

## 2019-02-07 LAB
BACTERIA SPEC CULT: ABNORMAL
SPECIMEN SOURCE: ABNORMAL

## 2019-02-08 ENCOUNTER — TELEPHONE (OUTPATIENT)
Dept: FAMILY MEDICINE | Facility: CLINIC | Age: 44
End: 2019-02-08

## 2019-02-08 RX ORDER — NITROFURANTOIN MACROCRYSTAL 100 MG
100 CAPSULE ORAL 2 TIMES DAILY
Qty: 10 CAPSULE | Refills: 0 | Status: SHIPPED | OUTPATIENT
Start: 2019-02-08 | End: 2019-02-14

## 2019-02-08 NOTE — TELEPHONE ENCOUNTER
I left this detailed message on cell phone.  (Demographics- said OK to leave a message.).  KITTY Grajeda

## 2019-02-08 NOTE — TELEPHONE ENCOUNTER
RN, can you please inform pt that UC showed resistance to cipro. Can she please stop abx and start nitrofurantoin BID X 5 days.   DM

## 2019-02-10 ENCOUNTER — MYC MEDICAL ADVICE (OUTPATIENT)
Dept: FAMILY MEDICINE | Facility: CLINIC | Age: 44
End: 2019-02-10

## 2019-02-11 NOTE — TELEPHONE ENCOUNTER
Dr Nava - Would you recommend patient return to the ER for worsening or continued abdominal pain?  Please advise.  Marilou Ellington RN

## 2019-02-11 NOTE — TELEPHONE ENCOUNTER
ER - if abdominal pain is worsening.  From note, pt was given zantac. I would recommend also starting prilosec 40 mg daily, 30 minutes to 60 minutes before food. She can use peptobismol as needed. Avoid fatty food, spicy foods, citrus foods, alcohol, smoking, chocolate, caffeine and NSAIDs.   Follow up in the clinic within the next week.  DM

## 2019-02-12 NOTE — TELEPHONE ENCOUNTER
PA Initiation    Medication: Fluconazole  Insurance Company: [employer] - Phone [phone] Fax [phone]  Pharmacy Filling the Rx: [facility] [address] [place] [address] [address] AT [facility]  Filling Pharmacy Phone: [phone]  Filling Pharmacy Fax:    Start Date: 2/12/2019    Central Prior Authorization Team   Phone: [phone]

## 2019-02-13 ENCOUNTER — NURSE TRIAGE (OUTPATIENT)
Dept: NURSING | Facility: CLINIC | Age: 44
End: 2019-02-13

## 2019-02-14 NOTE — TELEPHONE ENCOUNTER
"Patient was seen in ED visit on 2/10/19 for an ulcer.  Patient says she is taking Zantac and a anti-nausea med.  Patient reports pain worse after she eats.  Patient says pain is better in the afternoon.  Reviewed guideline and care advice with caller to be seen in the next 24 hours.  Caller verbalizes understanding. Transferred call to schedule office visit per guideline.       Reason for Disposition    [1] MODERATE (e.g., interferes with normal activities) AND [2] pain comes and goes (cramps) AND [3] present > 24 hours  (Exception: pain with Vomiting or Diarrhea - see that Guideline)    Additional Information    Negative: Shock suspected (e.g., cold/pale/clammy skin, too weak to stand, low BP, rapid pulse)    Negative: Difficult to awaken or acting confused  (e.g., disoriented, slurred speech)    Negative: Passed out (i.e., lost consciousness, collapsed and was not responding)    Negative: Sounds like a life-threatening emergency to the triager    Negative: Chest pain    Negative: Pain is mainly in upper abdomen  (if needed ask: \"is it mainly above the belly button?\")    Negative: Followed an abdomen (stomach) injury    Negative: [1] Abdominal pain AND [2] pregnant < 20 weeks    Negative: [1] Abdominal pain AND [2] pregnant > 20 weeks    Negative: [1] Abdominal pain AND [2] postpartum < 1 month since delivery    Negative: [1] SEVERE pain (e.g., excruciating) AND [2] present > 1 hour    Negative: [1] SEVERE pain AND [2] age > 60    Negative: [1] Vomiting AND [2] contains red blood or black (\"coffee ground\") material  (Exception: few red streaks in vomit that only happened once)    Negative: Blood in bowel movements   (Exception: blood on surface of BM with constipation)    Negative: Black or tarry bowel movements  (Exception: chronic-unchanged  black-grey bowel movements AND is taking iron pills or Pepto-bismol)    Negative: Patient sounds very sick or weak to the triager    Negative: [1] MILD-MODERATE pain AND [2] " constant AND [3] present > 2 hours    Negative: [1] Vomiting AND [2] abdomen looks much more swollen than usual    Negative: [1] Vomiting AND [2] contains bile (green color)    Negative: White of the eyes have turned yellow (i.e., jaundice)    Negative: Fever > 103 F (39.4 C)    Negative: [1] Fever > 101 F (38.3 C) AND [2] age > 60    Negative: [1] Fever > 101 F (38.3 C) AND [2] bedridden (e.g., nursing home patient, CVA, chronic illness, recovering from surgery)    Negative: [1] Fever > 100.5 F (38.1 C) AND [2] diabetes mellitus or weak immune system (e.g., HIV positive, cancer chemo, splenectomy, organ transplant, chronic steroids)    Negative: [1] SEVERE abdominal pain AND [2] present < 1 hour  (all triage questions negative)    Protocols used: ABDOMINAL PAIN - FEMALE-ADULT-

## 2019-02-15 NOTE — TELEPHONE ENCOUNTER
Prior Authorization Not Needed per Insurance    Medication: Fluconazole  Insurance Company: [employer] - Phone [phone] Fax [phone]  Expected CoPay:      Pharmacy Filling the Rx: [facility] [id number] [facility] [address] AT [facility] [address]  Pharmacy Notified: Yes  Patient Notified: Yes    Called [facility] at [phone] to make sure if this is going through insurance for them as [employer] came back that drug is covered and no PA is needed. Per tech this was picked up on the 6th with no pa needed.

## 2019-03-25 ENCOUNTER — HOSPITAL ENCOUNTER (EMERGENCY)
Facility: CLINIC | Age: 44
Discharge: HOME | End: 2019-03-25
Attending: PHYSICIAN ASSISTANT | Admitting: PHYSICIAN ASSISTANT
Payer: COMMERCIAL

## 2019-03-25 VITALS
HEIGHT: 64 IN | TEMPERATURE: 98 F | DIASTOLIC BLOOD PRESSURE: 78 MMHG | WEIGHT: 140 LBS | OXYGEN SATURATION: 98 % | HEART RATE: 105 BPM | SYSTOLIC BLOOD PRESSURE: 111 MMHG | RESPIRATION RATE: 16 BRPM | BODY MASS INDEX: 23.9 KG/M2

## 2019-03-25 DIAGNOSIS — M54.12 CERVICAL RADICULOPATHY: ICD-10-CM

## 2019-03-25 PROCEDURE — 99283 EMERGENCY DEPT VISIT LOW MDM: CPT

## 2019-03-25 PROCEDURE — 25000132 ZZH RX MED GY IP 250 OP 250 PS 637: Performed by: PHYSICIAN ASSISTANT

## 2019-03-25 RX ORDER — LIDOCAINE 4 G/G
1 PATCH TOPICAL ONCE
Status: DISCONTINUED | OUTPATIENT
Start: 2019-03-25 | End: 2019-03-26 | Stop reason: HOSPADM

## 2019-03-25 RX ORDER — IBUPROFEN 600 MG/1
600 TABLET, FILM COATED ORAL EVERY 6 HOURS PRN
Qty: 30 TABLET | Refills: 0 | Status: SHIPPED | OUTPATIENT
Start: 2019-03-25 | End: 2019-12-11

## 2019-03-25 RX ORDER — METHOCARBAMOL 500 MG/1
500 TABLET, FILM COATED ORAL 4 TIMES DAILY PRN
Qty: 20 TABLET | Refills: 0 | Status: SHIPPED | OUTPATIENT
Start: 2019-03-25 | End: 2019-07-15

## 2019-03-25 RX ORDER — METHOCARBAMOL 750 MG/1
750 TABLET, FILM COATED ORAL ONCE
Status: COMPLETED | OUTPATIENT
Start: 2019-03-25 | End: 2019-03-25

## 2019-03-25 RX ORDER — IBUPROFEN 600 MG/1
600 TABLET, FILM COATED ORAL ONCE
Status: COMPLETED | OUTPATIENT
Start: 2019-03-25 | End: 2019-03-25

## 2019-03-25 RX ADMIN — IBUPROFEN 600 MG: 600 TABLET ORAL at 22:38

## 2019-03-25 RX ADMIN — LIDOCAINE 1 PATCH: 560 PATCH PERCUTANEOUS; TOPICAL; TRANSDERMAL at 22:38

## 2019-03-25 RX ADMIN — METHOCARBAMOL 750 MG: 750 TABLET ORAL at 22:38

## 2019-03-25 ASSESSMENT — ENCOUNTER SYMPTOMS
NUMBNESS: 1
NECK PAIN: 1
ARTHRALGIAS: 1
CHILLS: 0
NAUSEA: 0
VOMITING: 0
BACK PAIN: 1
MYALGIAS: 1
FEVER: 0

## 2019-03-25 ASSESSMENT — MIFFLIN-ST. JEOR: SCORE: 1280.04

## 2019-03-25 NOTE — ED AVS SNAPSHOT
[facility] Emergency Department  [address]  [facility] [address]  Phone:  [phone]  Fax:  [phone]                                    Maria Esther Haro   MRN: [id number]    Department:  [facility] Emergency Department   Date of Visit:  3/25/2019           After Visit Summary Signature Page    I have received my discharge instructions, and my questions have been answered. I have discussed any challenges I see with this plan with the nurse or doctor.    ..........................................................................................................................................  Patient/Patient Representative Signature      ..........................................................................................................................................  Patient Representative Print Name and Relationship to Patient    ..................................................               ................................................  Date                                   Time    ..........................................................................................................................................  Reviewed by Signature/Title    ...................................................              ..............................................  Date                                               Time          22EPIC Rev 08/18

## 2019-03-26 ENCOUNTER — PATIENT OUTREACH (OUTPATIENT)
Dept: CARE COORDINATION | Facility: CLINIC | Age: 44
End: 2019-03-26

## 2019-03-26 DIAGNOSIS — M54.12 CERVICAL RADICULOPATHY: Primary | ICD-10-CM

## 2019-03-26 NOTE — ED TRIAGE NOTES
Woke up around 11am yesterday with right arm and shoulder pain, radiating down arm. Denies any chest pain/sob. Not getting better. ABCs intact.

## 2019-03-26 NOTE — DISCHARGE INSTRUCTIONS
You can also purchase lidocaine patches over the counter to place over the neck/upper back are to numb the irritated nerve. Use per package instructions.

## 2019-03-26 NOTE — PROGRESS NOTES
Clinic Care Coordination Contact  [facility]/Voicemail    Referral Source: ED Follow-Up    ED visit 3/25/2019-Cervical Radiculopathy    Clinical Data: Care Coordinator Outreach    Outreach attempted x 1.  Left message on voicemail with call back information and requested return call.    Plan:. Care Coordinator will try to reach patient again in 1-2 business days.    Gretchen Sorenson RN / Clinical Care Coordinator     [facility]   [email or web address]  Office :  [phone] / Fax :  [phone]

## 2019-03-26 NOTE — ED PROVIDER NOTES
"  History     Chief Complaint:  Arm Pain    HPI   Maria Esther Haro is a 44 year old female with a history of substance abuse who presents to the Emergency Department today for evaluation of right arm pain. Patient woke up yesterday with a numb and painful right arm, which feels as if she hit her funny bone but the sensation does not dissipate. The pain starts in her back and right shoulder and radiates down the arm. Denies injury or trauma, but thinks maybe she slept wrong. She reports right-sided neck pain. No fever or chills. No nausea or vomiting.     Allergies:  Acetaminophen: itching  Oxycodone: itching  Oxycontin: itching  Percocet: itching  Vicodin: itching      Medications:    Topamax      Past Medical History:    Borderline personality disorder  Bipolar affective disorder  Substance use disorder  HSV-2  Amphetamine abuse  Restless leg syndrome  Depression  Alcohol abuse  Migraine     Past Surgical History:    Breast augmentation   section x3  Tear in cervix     Family History:    Family history unknown because patient is adopted    Social History:  Smoking status: Current every day smoker, 5.00 packs/day  Alcohol use: No  Marital Status:  Single [1]     Review of Systems   Constitutional: Negative for chills and fever.   Gastrointestinal: Negative for nausea and vomiting.   Musculoskeletal: Positive for arthralgias, back pain, myalgias and neck pain.   Neurological: Positive for numbness.   All other systems reviewed and are negative.       Physical Exam     Patient Vitals for the past 24 hrs:   BP Temp Temp src Pulse Heart Rate Resp SpO2 Height Weight   19 2345 111/78 -- -- 105 -- 16 98 % -- --   19 2300 -- -- -- -- -- -- 100 % -- --   19 2245 -- -- -- -- -- -- 100 % -- --   19 2217 117/77 98  F (36.7  C) Temporal 121 121 18 100 % 1.626 m (5' 4\") 63.5 kg (140 lb)       Physical Exam  Constitutional: uncomfortable appearing, but no acute distress.   Head: No external signs of " trauma noted to head or face.   Eyes: Pupils are equal, round, and reactive to light. Conjunctiva normal.  ENT: MMM. Normal voice.   Neck: No midline cervical spine tenderness. Left-sided muscular tenderness present. Normal ROM.   Cardiovascular: Normal rate, regular rhythm, and intact distal pulses.    Respiratory: Effort normal. No respiratory distress. Lungs clear to auscultation bilaterally.   GI: Soft. There is no tenderness.  Musculoskeletal: No deformities appreciated. No edema noted.  Left sided neck muscle tenderness as noted above. No other back tenderness. Right upper extremity without deformity, swelling, ecchymosis, or redness. RUE is non-tender to palpation with normal ROM of shoulder, elbow, wrist, hand, and digits.   Neurological: Alert and Oriented x 3. Speech normal. Moves all extremities equally. 5/5 strength of bilateral upper extremities, including  strength, finger abduction, thumb/finger opposition, wrist extension, elbow flex/ext, shoulder abduction. Sensation is intact to light touch in axillary, radial, median, and ulnar nerve distributions.    Psychiatric: Appropriate mood, affect, and behavior.   Skin: Skin is warm and dry.          Emergency Department Course     Interventions:  2238: Lidocaine 4% patch transdermal  2238: Ibuprofen 600 MG PO  2238: Robaxin 750 MG PO    Emergency Department Course:  Past medical records, nursing notes, and vitals reviewed.  2225: I performed an exam of the patient and obtained history, as documented above.    2346: I rechecked the patient. Explained findings to the patient.    Findings and plan explained to the Patient. Patient discharged home with instructions regarding supportive care, medications, and reasons to return. The importance of close follow-up was reviewed.      Impression & Plan      Medical Decision Making:  Maria Esther Haro is a 44 year old female who presents for evaluation of right sided neck pain radiating down right arm. This is most  consistent with cervical radiculopathy. There is no history of trauma to indicate need for x-rays or CT at this time. She has no numbness, weakness, or other concerning red flags to indicate need for MRI. I doubt fracture, ligamentous instability, myelopathy, dissection, spinal tumor or abscess at this time. I discussed worrisome symptoms/signs, if they were to evolve, that should prompt the patient to follow up more quickly or return to the ED.  There are no red flag symptoms to suggest we need further workup or advanced imaging at this point. She is feeling significantly improved after medications in the ED. Supportive outpatient management is indicated. Discussed radiculopathy, causes and treatments.  Discussed that this does not mean they necessarily need an MRI but they do need close f/u of primary. Decided on pain control and muscle spasm control.     Diagnosis:    ICD-10-CM   1. Cervical radiculopathy M54.12     Disposition:  discharged to home    Discharge Medications:     Medication List      Started    ibuprofen 600 MG tablet  Commonly known as:  ADVIL/MOTRIN  600 mg, Oral, EVERY 6 HOURS PRN     methocarbamol 500 MG tablet  Commonly known as:  ROBAXIN  500 mg, Oral, 4 TIMES DAILY PRN          Olga Lynn  3/25/2019   [facility] EMERGENCY DEPARTMENT  Scribe Disclosure:  I, Olga Lynn, am serving as a scribe at 10:25 PM on 3/25/2019 to document services personally performed by Alessia Dinero PA-C based on my observations and the provider's statements to me.        Alessia Dinero PA-C  03/26/19 0158

## 2019-03-27 NOTE — PROGRESS NOTES
Clinic Care Coordination Contact  [facility]/Voicemail    Referral Source: ED Follow-Up    ED visit 3/25/2019-Cervical Radiculopathy    Clinical Data: Care Coordinator Outreach  Outreach attempted x 2.  Left message on voicemail with call back information and requested return call.  Plan:  Care Coordinator will do no further outreaches at this time.  Gretchen Sorenson RN / Clinical Care Coordinator     [facility]   [email or web address]  Office :  [phone] / Fax :  [phone]

## 2019-03-29 ENCOUNTER — TELEPHONE (OUTPATIENT)
Dept: FAMILY MEDICINE | Facility: CLINIC | Age: 44
End: 2019-03-29

## 2019-03-29 DIAGNOSIS — R11.0 NAUSEA: Primary | ICD-10-CM

## 2019-03-29 RX ORDER — ONDANSETRON 4 MG/1
4 TABLET, ORALLY DISINTEGRATING ORAL EVERY 8 HOURS PRN
Qty: 20 TABLET | Refills: 0 | Status: SHIPPED | OUTPATIENT
Start: 2019-03-29 | End: 2019-07-15

## 2019-03-29 NOTE — TELEPHONE ENCOUNTER
Have attempted to reach patient and phone immediately goes to a message saying person you are trying to reach is mot accepting calls at this time.  Will continue to try to reach patient.  Marilou Ellington RN

## 2019-03-29 NOTE — TELEPHONE ENCOUNTER
Patient was seen in ER earlier this week for neck pain and numbness and tinglinig that went down the right arm  Was discharged from ER with muscle relaxer (Methocarbamol) and Ibuprofen.  States she continues to have the numbness and tingling in her right arm but is somewhat improved.  MAIN CONCERN: Current main concern is that she gets nauseous with standing and perhaps slightly dizzy.  She did try a Zofran that she had from before and said it helped, but nausea then came back.  Per patient and ER note she was to have close follow-up with primary, but has not made a follow-up appointment.  Please advise.  Marilou Ellington RN

## 2019-03-29 NOTE — TELEPHONE ENCOUNTER
Would you be willing to give patient a prescription for a few Zofran?, she only had 1 tab left.  Marilou Ellington RN

## 2019-03-29 NOTE — TELEPHONE ENCOUNTER
NSAID can cause nausea.   She can stay hydrated and get up slowly from sitting to standing position.   Zofran TID PRN.   No appts available at this clinic. F/u at UC if symptoms are not improving by this evening.

## 2019-03-29 NOTE — TELEPHONE ENCOUNTER
Attempted again to reach patient and phone immediately goes to a message saying person you are trying to reach is mot accepting calls at this time as noted below.    Thanks! Ashlee Bravo RN

## 2019-03-30 ENCOUNTER — HOSPITAL ENCOUNTER (EMERGENCY)
Facility: CLINIC | Age: 44
Discharge: HOME | End: 2019-03-30
Attending: EMERGENCY MEDICINE | Admitting: EMERGENCY MEDICINE
Payer: COMMERCIAL

## 2019-03-30 ENCOUNTER — APPOINTMENT (OUTPATIENT)
Dept: GENERAL RADIOLOGY | Facility: CLINIC | Age: 44
End: 2019-03-30
Attending: EMERGENCY MEDICINE
Payer: COMMERCIAL

## 2019-03-30 VITALS
WEIGHT: 135 LBS | OXYGEN SATURATION: 99 % | TEMPERATURE: 98 F | SYSTOLIC BLOOD PRESSURE: 146 MMHG | BODY MASS INDEX: 22.49 KG/M2 | DIASTOLIC BLOOD PRESSURE: 98 MMHG | HEIGHT: 65 IN

## 2019-03-30 DIAGNOSIS — R11.0 NAUSEA: ICD-10-CM

## 2019-03-30 DIAGNOSIS — M54.12 CERVICAL RADICULOPATHY: ICD-10-CM

## 2019-03-30 DIAGNOSIS — S16.1XXA CERVICAL STRAIN, INITIAL ENCOUNTER: ICD-10-CM

## 2019-03-30 PROCEDURE — 71046 X-RAY EXAM CHEST 2 VIEWS: CPT

## 2019-03-30 PROCEDURE — 72040 X-RAY EXAM NECK SPINE 2-3 VW: CPT

## 2019-03-30 PROCEDURE — 99284 EMERGENCY DEPT VISIT MOD MDM: CPT | Mod: 25

## 2019-03-30 RX ORDER — ONDANSETRON 4 MG/1
4 TABLET, ORALLY DISINTEGRATING ORAL EVERY 8 HOURS PRN
Qty: 10 TABLET | Refills: 0 | Status: SHIPPED | OUTPATIENT
Start: 2019-03-30 | End: 2019-07-15

## 2019-03-30 RX ORDER — ONDANSETRON 4 MG/1
4 TABLET, ORALLY DISINTEGRATING ORAL ONCE
Status: DISCONTINUED | OUTPATIENT
Start: 2019-03-30 | End: 2019-03-30 | Stop reason: HOSPADM

## 2019-03-30 ASSESSMENT — ENCOUNTER SYMPTOMS
FEVER: 0
WEAKNESS: 0
ABDOMINAL PAIN: 0
SHORTNESS OF BREATH: 0
WOUND: 0
NECK PAIN: 1
NUMBNESS: 1
COLOR CHANGE: 0
COUGH: 0
ENDOCRINE NEGATIVE: 1
HEADACHES: 0
ALLERGIC/IMMUNOLOGIC NEGATIVE: 1
MYALGIAS: 1
BRUISES/BLEEDS EASILY: 0
ARTHRALGIAS: 1

## 2019-03-30 ASSESSMENT — MIFFLIN-ST. JEOR: SCORE: 1273.24

## 2019-03-30 NOTE — ED PROVIDER NOTES
History     Chief Complaint:  Breast Problem      HPI   Maria Esther Haro is a 44 year old female who presents with complaints of right arm and neck pain for 6 days.  The patient admits to using GHB last Sunday and passing out for several hours.  She cannot recall if she fell but has no evidence of trauma to her head or upper torso.  She presents to the ED this morning because of continued pain in her arm and concerns about her right breast implant.  Patient was seen at [facility] on 3/25/2019.  She was diagnosed with cervical radiculopathy and discharged with prescriptions for lidocaine patch, ibuprofen and methocarbamol.      Allergies:  Allergies   Allergen Reactions     Acetaminophen Itching     No Clinical Screening - See Comments      Other reaction(s): Unknown     Oxycodone Itching     Oxycontin [Oxycodone Hcl] Itching     Percocet [Oxycodone-Acetaminophen] Itching     Vicodin [Acetaminophen] Itching        Medications:      ibuprofen (ADVIL/MOTRIN) 600 MG tablet   methocarbamol (ROBAXIN) 500 MG tablet   ondansetron (ZOFRAN-ODT) 4 MG ODT tab   topiramate (TOPAMAX) 50 MG tablet       Problem List:    Patient Active Problem List    Diagnosis Date Noted     Borderline personality disorder (H) 01/21/2019     Priority: Medium     Bipolar affective disorder, currently depressed, moderate (H) 01/21/2019     Priority: Medium     Substance use disorder 01/21/2019     Priority: Medium     HSV-2 (herpes simplex virus 2) infection 10/31/2018     Priority: Medium     Diagnosed at 20         ASCUS on Pap smear 05/16/2011     Priority: Medium     5/16/11 Pap - Ascus, + HPV 83 . Unable to assign carcinogenicity. Pt to have repeat pap done in one year.(5/12) Type 83 considered low risk (10/2018)  09/21/12 Pt. Has not responded to telephone calls, letters, or certified letters, message sent to Usman Sousa for Pt. To be put in the inactive file.  09/26/12 Per Usman Sousa, pt. Put in the inactive file for pap  "tracking due to no response from pt., episode resolved.  02/10/16 DX NL pap, neg HPV, pt. Is to have co-testing in one year.  18 Patient is lost to follow-up.   10/31/18 NIL pap, neg HR HPV. Plan 1 year cotest - if normal, can go to routine screening         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      Priority: Medium     Since May 2009. However relapsed 10\"09.  Problem list name updated by automated process. Provider to review       Restless leg syndrome      Priority: Medium     Mild major depression (H)      Priority: Medium     Alcohol abuse, in remission      Priority: Medium     Remission since           Past Medical History:    Past Medical History:   Diagnosis Date     Alcohol abuse, in remission      ASCUS on Pap smear 11     Depressive disorder, not elsewhere classified      Migraine      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, in remission (H)      Other, mixed, or unspecified nondependent drug abuse, in remission      Restless leg syndrome        Past Surgical History:    Past Surgical History:   Procedure Laterality Date     C ANESTH,SURG BREAST RECONSTRUCTIVE      Breast augmentation     C NONSPECIFIC PROCEDURE   &      x 3, previous tear in cervix       Family History:    Family History   Adopted: Yes   Problem Relation Age of Onset     Unknown/Adopted No family hx of         Pt is adopted     Macular Degeneration No family hx of      Glaucoma No family hx of        Social History:  Marital Status:  Single [1]  Social History     Tobacco Use     Smoking status: Current Every Day Smoker     Years: 5.00     Types: Cigarettes     Smokeless tobacco: Never Used     Tobacco comment: 2-3 cigs/day   Substance Use Topics     Alcohol use: No     Comment: none     Drug use: Yes     Types: Amphetamines, GHB, Methamphetamines     Comment: GHB and Meth daily. Pt. was rehab in  for meth use, brief relapse in 10/09 for appx. one " "week, states has since resolved.        Review of Systems   Constitutional: Negative for fever.   HENT: Negative.    Eyes: Negative for visual disturbance.   Respiratory: Negative for cough and shortness of breath.    Cardiovascular: Positive for chest pain.   Gastrointestinal: Negative for abdominal pain.   Endocrine: Negative.    Genitourinary: Negative.    Musculoskeletal: Positive for arthralgias, myalgias and neck pain.   Skin: Negative for color change, rash and wound.   Allergic/Immunologic: Negative.    Neurological: Positive for numbness. Negative for weakness and headaches.   Hematological: Does not bruise/bleed easily.   All other systems reviewed and are negative.        Physical Exam   First Vitals:  BP: (!) 146/98  Heart Rate: 121  Temp: 98  F (36.7  C)  Height: 165.1 cm (5' 5\")  Weight: 61.2 kg (135 lb)  SpO2: 99 %      Physical Exam    Patient Vitals for the past 24 hrs:   BP Temp Temp src Heart Rate SpO2 Height Weight   03/30/19 0603 (!) 146/98 98  F (36.7  C) Oral 121 99 % 1.651 m (5' 5\") 61.2 kg (135 lb)       General: Alert and Interactive.   Head: No signs of trauma.   Mouth/Throat: Oropharynx is clear and moist.   Eyes: Conjunctivae are normal. Pupils are equal, round, and reactive to light.   Neck: Normal range of motion. No nuchal rigidity.   CV: Normal rate and regular rhythm.    Resp: Effort normal and breath sounds normal. No respiratory distress.   GI: Soft. There is no tenderness or guarding.   MSK: Normal range of motion. no edema. pain right arm. No deformity  Neuro: The patient is alert and oriented to person, place, and time.  PERRLA, EOMI, strength in upper/lower extremities normal and symmetrical.   Sensation normal. Speech normal.  GCS eye subscore is 4. GCS verbal subscore is 5. GCS motor subscore is 6.   Skin: Skin is warm and dry. No rash noted. no bruising  Psych: normal mood and affect. behavior is normal.     Emergency Department Course   Imaging:  XR CERVICAL SPINE 2/3 VWS " 3/30/2019 6:58 AM  HISTORY: Fall.  COMPARISON: None.  FINDINGS: Cervical alignment is anatomic. Vertebral body heights and disc spaces are preserved. Prevertebral soft tissues are normal.                                                             IMPRESSION: Normal cervical spine.  DEVONTE BLANKENSHIP MD    XR CHEST 2 VW 3/30/2019 6:57 AM  HISTORY: Fall.  COMPARISON: None.  FINDINGS: No airspace consolidation, pleural effusion or pneumothorax. Normal heart size. Osseous structures appear intact.                                                               IMPRESSION: No acute abnormality.  DEVONTE BLANKENSHIP MD    Interventions:  zofran PO    Emergency Department Course/ Medical Decision Making:  This patient presents to emerge department complaints or and about her arm and the right side of her chest and her right breast.  Her breast tissue appears normal and symmetric with the left side I doubt a rupture of her implant has occurred.  The patient has symptoms consistent with a cervical radiculopathy.  X-rays were done of her cervical spine and are normal.  X-ray of her chest reveals no evidence for pneumothorax or rib fracture.    Diagnosis:    ICD-10-CM    1. Cervical strain, initial encounter S16.1XXA    2. Cervical radiculopathy M54.12    3. Nausea R11.0        Disposition:  discharged to home  Referral to Reginaldo Suazo, orthopedic spine in 3-5 days if not improved.    Discharge Medications:  Zofran ODT      Alber Galeana  3/30/2019    EMERGENCY DEPARTMENT       Alber Galeana MD  03/30/19 0735

## 2019-03-30 NOTE — ED AVS SNAPSHOT
Emergency Department  [address] [place] [id number]  Phone:  [phone]  Fax:  [phone]                                    Maria Esther Haro   MRN: [id number]    Department:   Emergency Department   Date of Visit:  3/30/2019           After Visit Summary Signature Page    I have received my discharge instructions, and my questions have been answered. I have discussed any challenges I see with this plan with the nurse or doctor.    ..........................................................................................................................................  Patient/Patient Representative Signature      ..........................................................................................................................................  Patient Representative Print Name and Relationship to Patient    ..................................................               ................................................  Date                                   Time    ..........................................................................................................................................  Reviewed by Signature/Title    ...................................................              ..............................................  Date                                               Time          22EPIC Rev 08/18

## 2019-04-01 ENCOUNTER — PATIENT OUTREACH (OUTPATIENT)
Dept: CARE COORDINATION | Facility: CLINIC | Age: 44
End: 2019-04-01

## 2019-04-01 DIAGNOSIS — M54.12 CERVICAL RADICULOPATHY: Primary | ICD-10-CM

## 2019-04-01 NOTE — PROGRESS NOTES
Clinic Care Coordination Contact    Clinic Care Coordination Contact  OUTREACH    Referral Information:  Referral Source: ED Follow-Up         Chief Complaint   Patient presents with     Clinic Care Coordination - [facility]     Clinic Care Coordination RN         Universal Utilization: ED visit 3/30/3019-right arm and neck pain Cervical  radiculopathy      Utilization    Last refreshed: 3/30/2019  9:49 AM:  Hospital Admissions 0           Last refreshed: 3/30/2019  9:49 AM:  ED Visits 2           Last refreshed: 3/30/2019  9:49 AM:  No Show Count (past year) 3              Current as of: 3/30/2019  9:49 AM              Clinical Concerns:  Current Medical Concerns:  Patient reports she fell asleep on her arm and when she woke up she had terrible pain in that arm Patient is taking pain medication as needed   Discomfort is when she has ROM with the right arm  Patient plans to make an Orthopedic appointment today with Dr Reginaldo Cardona .       Health Maintenance Reviewed:    Clinical Pathway: None    Medication Management:  Reviewed      Functional Status:  Bed or wheelchair confined:: No    Living Situation:  Current living arrangement:: I live in a private home       Psychosocial:  Mental health DX:: Yes  Mental health DX how managed:: Medication  Mental health management concern (GOAL):: No  Informal Support system:: Family          Community Resources: None     Advance Care Plan/Directive  Advanced Care Plans/Directives on file:: No    Referrals Placed: None     Goals:   Goals        General    #1 Pain Management     Notes - Note created  4/1/2019 10:39 AM by Gretchen Sorenson, RN    Goal Statement:I will decrease right arm and neck pain   Measure of Success: More mobility of right arm   Supportive Steps to Achieve:   I will use pain medication as directed   I will make a future appointment with the orthopedic provider   I will rest right arm   Barriers: 2 ED visits   Strengths: agrees with the plan   Date to Achieve  By: 4/28/2019  Patient expressed understanding of goal:Yes               Patient/Caregiver understanding: Expresses good understanding of discharge instructions     Outreach Frequency: weekly      Plan:   Patient will take pain medication as directed,rest right arm and make a future appointment with Dr Reginaldo Cardona/Orthopedic provider   Care plan and CC introduction letter mailed today   CC will follow up in 3-5 business days     Gretchen Sorenson RN, Care Coordinator   [facility] -Care Coordination  Taran Clements

## 2019-04-01 NOTE — LETTER
[facility]  Complex Care Plan  About Me  Patient Name:  Maria Esther Haro    YOB: 1975  Age:     44 year old   [place] MRN:   [id number] Telephone Information:  Home Phone [phone]   Mobile [phone]       Address:    [address]  [place] [address] Email address:  [email or web address]      Emergency Contact(s)  Name Relationship Lgl Grd Work Phone Home Phone Mobile Phone   1. GIANNA ROMERO Mother   [phone]   2. NAVIN,ART Relative   [phone]           Primary language:  English     needed? No   [facility] [employer] [facility]:  [phone] op. 1  Other communication barriers:    Preferred Method of Communication:  Adrienne  Current living arrangement: I live in a private home  Mobility Status/ Medical Equipment:      Health Maintenance  Health Maintenance Reviewed:      My Access Plan  Medical Emergency 911   Primary Clinic Line [facility] - [phone]   24 Hour Appointment Line [phone] or  [phone]) (toll-free)   24 Hour Nurse Line [phone] (toll-free)   Preferred Urgent Care [facility] - [facility], [phone]   Preferred Hospital [facility]  [phone]   Preferred Pharmacy [facility] [address] - [facility] [address] AT [facility] [address]     Behavioral Health Crisis Line The National Suicide Prevention Lifeline at 1-536.404.7012 or 911     My Care Team Members    Patient Care Team       Relationship Specialty Notifications Start End    Jose Luis Nava MD PCP - General Family Practice  2/13/19     Phone: [phone] Fax: [phone] [address] [facility] [address]    Jose Luis Nava MD Assigned PCP   12/30/18     Phone: [phone] Fax: [phone] [address] [facility] [address]    Jodie Segundo Chi, OD MD Optometry  1/8/19     Phone: [phone]  Fax: [phone]         [address] [facility] [address]    Gretchen Sorenson, RN Clinic Care Coordinator Primary Care - CC Admissions 3/26/19     Phone: [phone] Fax: [phone]                My Care Plans  Self Management and Treatment Plan  Goals and (Comments)  Goals        General    #1 Pain Management     Notes - Note created  4/1/2019 10:39 AM by Gretchen Sorenson, RN    Goal Statement:I will decrease right arm and neck pain   Measure of Success: More mobility of right arm   Supportive Steps to Achieve:   I will use pain medication as directed   I will make a future appointment with the orthopedic provider   I will rest right arm   Barriers: 2 ED visits   Strengths: agrees with the plan   Date to Achieve By: 4/28/2019  Patient expressed understanding of goal:Yes              Action Plans on File:                       Advance Care Plans/Directives Type: None       My Medical and Care Information  Problem List   Patient Active Problem List   Diagnosis     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     Restless leg syndrome     Mild major depression (H)     Alcohol abuse, in remission     ASCUS on Pap smear     HSV-2 (herpes simplex virus 2) infection     Borderline personality disorder (H)     Bipolar affective disorder, currently depressed, moderate (H)     Substance use disorder      Current Medications and Allergies:  See printed Medication Report.    Care Coordination Start Date: 4/1/2019   Frequency of Care Coordination: weekly   Form Last Updated: 04/01/2019

## 2019-04-01 NOTE — LETTER
[facility] CARE COORDINATION  [address]  [facility] [address]    April 1, 2019    Maria Esther Haro  [address] [place] [address]      Dear Maria Esther,    I am a clinic care coordinator who works with Jose Luis Nava MD at [facility] . I wanted to thank you for spending the time to talk with me.  I wanted to introduce myself and provide you with my contact information so that you can call me with questions or concerns about your health care. Below is a description of clinic care coordination and how I can further assist you.     The clinic care coordinator is a registered nurse and/or  who understand the health care system. The goal of clinic care coordination is to help you manage your health and improve access to the [facility] system in the most efficient manner. The registered nurse can assist you in meeting your health care goals by providing education, coordinating services, and strengthening the communication among your providers. The  can assist you with financial, behavioral, psychosocial, chemical dependency, counseling, and/or psychiatric resources.    Please feel free to contact me at [phone], with any questions or concerns. We at [facility] are focused on providing you with the highest-quality healthcare experience possible and that all starts with you.     Sincerely,     Gretchen Sorenson RN, Care Coordinator   [facility] -Care Coordination  [place]        Enclosed: I have enclosed a copy of the Complex Care Plan. This has helpful information and goals that we have talked about. Please keep this in an easy to access place to use as needed.

## 2019-04-15 ENCOUNTER — PATIENT OUTREACH (OUTPATIENT)
Dept: CARE COORDINATION | Facility: CLINIC | Age: 44
End: 2019-04-15

## 2019-04-15 DIAGNOSIS — M54.12 CERVICAL RADICULOPATHY: Primary | ICD-10-CM

## 2019-04-15 NOTE — PROGRESS NOTES
Clinic Care Coordination Contact  [facility]       Clinical Data: Care Coordinator Outreach    ED visit 3/30/2019---presents with complaints of right arm and neck pain for 6 days.  The patient admits to using GHB last Sunday and passing out for several hours.  She cannot recall if she fell but has no evidence of trauma to her head or upper torso.  She presents to the ED this morning because of continued pain in her arm and concerns about her right breast implant.  Patient was seen at [facility] on 3/25/2019.  She was diagnosed with cervical radiculopathy and discharged with prescriptions for lidocaine patch, ibuprofen and methocarbamol.        Outreach attempted x 1.  Left message on FriendFeed with call back information and requested return call.    Plan: Care Coordinator will await a return call from the patient     Gretchen Sorenson RN, Care Coordinator   [facility] -Care Coordination  Taran Clements

## 2019-05-01 NOTE — PROGRESS NOTES
Clinic Care Coordination Contact  [facility]/Voicemail    Referral Source: ED Follow-Up  ED visit 3/30/2019---presents with complaints of right arm and neck pain for 6 days.  The patient admits to using GHB last Sunday and passing out for several hours.  She cannot recall if she fell but has no evidence of trauma to her head or upper torso.  She presents to the ED this morning because of continued pain in her arm and concerns about her right breast implant.  Patient was seen at [facility] on 3/25/2019.  She was diagnosed with cervical radiculopathy and discharged with prescriptions for lidocaine patch, ibuprofen and methocarbamol.    Clinical Data: Care Coordinator Outreach  Outreach attempted x 2.  Left message on voicemail with call back information and requested return call.  Plan:Care Coordinator will do no further outreaches at this time.  Gretchen Sorenson RN, Care Coordinator   [facility] -Care Coordination  aTran Clements

## 2019-07-10 ENCOUNTER — MYC MEDICAL ADVICE (OUTPATIENT)
Dept: FAMILY MEDICINE | Facility: CLINIC | Age: 44
End: 2019-07-10

## 2019-07-10 ENCOUNTER — MYC REFILL (OUTPATIENT)
Dept: PSYCHIATRY | Facility: CLINIC | Age: 44
End: 2019-07-10

## 2019-07-10 DIAGNOSIS — F31.32 BIPOLAR AFFECTIVE DISORDER, CURRENTLY DEPRESSED, MODERATE (H): ICD-10-CM

## 2019-07-10 RX ORDER — TOPIRAMATE 50 MG/1
50 TABLET, FILM COATED ORAL DAILY
Qty: 30 TABLET | Refills: 1 | OUTPATIENT
Start: 2019-07-10

## 2019-07-10 RX ORDER — TOPIRAMATE 50 MG/1
50 TABLET, FILM COATED ORAL DAILY
Qty: 30 TABLET | Refills: 1 | Status: CANCELLED | OUTPATIENT
Start: 2019-07-10

## 2019-07-10 ASSESSMENT — ENCOUNTER SYMPTOMS
ABDOMINAL PAIN: 0
DIZZINESS: 0
SHORTNESS OF BREATH: 0
EYE PAIN: 1
FEVER: 0
PALPITATIONS: 0
DIARRHEA: 0
CHILLS: 0
NAUSEA: 0
WEAKNESS: 0
HEADACHES: 0
NERVOUS/ANXIOUS: 0
PARESTHESIAS: 0
MYALGIAS: 0
JOINT SWELLING: 0
DYSURIA: 0
HEMATURIA: 0
CONSTIPATION: 0
HEARTBURN: 0
HEMATOCHEZIA: 0
ARTHRALGIAS: 1
FREQUENCY: 0
SORE THROAT: 0
COUGH: 0
BREAST MASS: 0

## 2019-07-10 NOTE — TELEPHONE ENCOUNTER
Dr. Nava-Please review and sign if agree.    30 day supply pended.    Please also see 7/9/19 [facility] refill request Psychiatry encounter for additional information.    Thank you!  NEELAM AlmodovarN, RN

## 2019-07-10 NOTE — TELEPHONE ENCOUNTER
Dr. Nava-Please review and sign if agree.    Med and pharmacy pended.    Thank you!  NEELAM AlmodovarN, RN

## 2019-07-10 NOTE — TELEPHONE ENCOUNTER
Patient called back stating she is taking Topamax & takes 50 MG     Patient also states she has an up coming appt with psychiatry as well

## 2019-07-10 NOTE — TELEPHONE ENCOUNTER
Dr. Nava wanted to make sure pt is taking the topamax and the dose.   Dr. Nava said she would fill one month while pt is getting in to see psychiatry.  Pt needs to establish with psychiatry for this med.    LM for pt to call clinic.  I also sent pt a mychart.  KITTY Grajeda

## 2019-07-10 NOTE — TELEPHONE ENCOUNTER
Patient has not been seen by Tripp Hidalgo CNP since Januray 2019 for her first and only visit. She did not follow-up as directed and she has no future appointment with Tripp Thomas CNP.     Refill was denied by our team and routed back to her referring primary care provider, Dr. Jose Luis Nava. The is per our CCPS agreement between the referring PCP and our psychiatry providers.     Dr. Nava declined to refill. Will send to PCP again to review and consideration of refill.

## 2019-07-11 ENCOUNTER — MYC MEDICAL ADVICE (OUTPATIENT)
Dept: FAMILY MEDICINE | Facility: CLINIC | Age: 44
End: 2019-07-11

## 2019-07-11 ENCOUNTER — MYC REFILL (OUTPATIENT)
Dept: FAMILY MEDICINE | Facility: CLINIC | Age: 44
End: 2019-07-11

## 2019-07-11 DIAGNOSIS — F31.32 BIPOLAR AFFECTIVE DISORDER, CURRENTLY DEPRESSED, MODERATE (H): ICD-10-CM

## 2019-07-11 RX ORDER — TOPIRAMATE 50 MG/1
50 TABLET, FILM COATED ORAL DAILY
Qty: 30 TABLET | Refills: 0 | Status: SHIPPED | OUTPATIENT
Start: 2019-07-11 | End: 2019-08-20

## 2019-07-11 RX ORDER — TOPIRAMATE 50 MG/1
50 TABLET, FILM COATED ORAL DAILY
Qty: 30 TABLET | Refills: 0 | Status: CANCELLED | OUTPATIENT
Start: 2019-07-11

## 2019-07-11 NOTE — TELEPHONE ENCOUNTER
Duplicate.    Dr. Nava refilled medication today for 30 day supply.    Writer responded as per below  NEELAM AlmodovarN, RN

## 2019-07-15 ENCOUNTER — OFFICE VISIT (OUTPATIENT)
Dept: FAMILY MEDICINE | Facility: CLINIC | Age: 44
End: 2019-07-15
Payer: COMMERCIAL

## 2019-07-15 VITALS
WEIGHT: 140.5 LBS | HEIGHT: 64 IN | DIASTOLIC BLOOD PRESSURE: 85 MMHG | BODY MASS INDEX: 23.99 KG/M2 | TEMPERATURE: 96.8 F | SYSTOLIC BLOOD PRESSURE: 125 MMHG | OXYGEN SATURATION: 99 % | HEART RATE: 104 BPM

## 2019-07-15 DIAGNOSIS — Z23 NEED FOR TD VACCINE: ICD-10-CM

## 2019-07-15 DIAGNOSIS — M67.442 GANGLION CYST OF FINGER OF LEFT HAND: ICD-10-CM

## 2019-07-15 DIAGNOSIS — Z00.00 ROUTINE GENERAL MEDICAL EXAMINATION AT A HEALTH CARE FACILITY: Primary | ICD-10-CM

## 2019-07-15 DIAGNOSIS — Z12.39 BREAST CANCER SCREENING: ICD-10-CM

## 2019-07-15 DIAGNOSIS — F31.9 BIPOLAR AFFECTIVE DISORDER, REMISSION STATUS UNSPECIFIED (H): ICD-10-CM

## 2019-07-15 PROCEDURE — 90714 TD VACC NO PRESV 7 YRS+ IM: CPT | Performed by: FAMILY MEDICINE

## 2019-07-15 PROCEDURE — 99213 OFFICE O/P EST LOW 20 MIN: CPT | Mod: 25 | Performed by: FAMILY MEDICINE

## 2019-07-15 PROCEDURE — 99396 PREV VISIT EST AGE 40-64: CPT | Mod: 25 | Performed by: FAMILY MEDICINE

## 2019-07-15 PROCEDURE — 90471 IMMUNIZATION ADMIN: CPT | Performed by: FAMILY MEDICINE

## 2019-07-15 ASSESSMENT — ENCOUNTER SYMPTOMS
ABDOMINAL PAIN: 0
COUGH: 0
FREQUENCY: 0
DIZZINESS: 0
PARESTHESIAS: 0
FEVER: 0
HEMATURIA: 0
CHILLS: 0
EYE PAIN: 1
JOINT SWELLING: 0
WEAKNESS: 0
CONSTIPATION: 0
NAUSEA: 0
HEARTBURN: 0
PALPITATIONS: 0
BREAST MASS: 0
SHORTNESS OF BREATH: 0
DYSURIA: 0
HEMATOCHEZIA: 0
ARTHRALGIAS: 1
MYALGIAS: 0
NERVOUS/ANXIOUS: 0
HEADACHES: 0
SORE THROAT: 0
DIARRHEA: 0

## 2019-07-15 ASSESSMENT — ANXIETY QUESTIONNAIRES
4. TROUBLE RELAXING: SEVERAL DAYS
6. BECOMING EASILY ANNOYED OR IRRITABLE: NOT AT ALL
2. NOT BEING ABLE TO STOP OR CONTROL WORRYING: NOT AT ALL
GAD7 TOTAL SCORE: 4
5. BEING SO RESTLESS THAT IT IS HARD TO SIT STILL: SEVERAL DAYS
7. FEELING AFRAID AS IF SOMETHING AWFUL MIGHT HAPPEN: SEVERAL DAYS
7. FEELING AFRAID AS IF SOMETHING AWFUL MIGHT HAPPEN: SEVERAL DAYS
1. FEELING NERVOUS, ANXIOUS, OR ON EDGE: NOT AT ALL
3. WORRYING TOO MUCH ABOUT DIFFERENT THINGS: SEVERAL DAYS
GAD7 TOTAL SCORE: 4
GAD7 TOTAL SCORE: 4

## 2019-07-15 ASSESSMENT — PATIENT HEALTH QUESTIONNAIRE - PHQ9
SUM OF ALL RESPONSES TO PHQ QUESTIONS 1-9: 3
SUM OF ALL RESPONSES TO PHQ QUESTIONS 1-9: 3
10. IF YOU CHECKED OFF ANY PROBLEMS, HOW DIFFICULT HAVE THESE PROBLEMS MADE IT FOR YOU TO DO YOUR WORK, TAKE CARE OF THINGS AT HOME, OR GET ALONG WITH OTHER PEOPLE: SOMEWHAT DIFFICULT

## 2019-07-15 ASSESSMENT — MIFFLIN-ST. JEOR: SCORE: 1282.3

## 2019-07-15 NOTE — PATIENT INSTRUCTIONS
Mammogram [phone]        Preventive Health Recommendations  Female Ages 40 to 49    Yearly exam:     See your health care provider every year in order to  1. Review health changes.   2. Discuss preventive care.    3. Review your medicines if your doctor prescribed any.      Get a Pap test every three years (unless you have an abnormal result and your provider advises testing more often).      If you get Pap tests with HPV test, you only need to test every 5 years, unless you have an abnormal result. You do not need a Pap test if your uterus was removed (hysterectomy) and you have not had cancer.      You should be tested each year for STDs (sexually transmitted diseases), if you're at risk.     Ask your doctor if you should have a mammogram.      Have a colonoscopy (test for colon cancer) if someone in your family has had colon cancer or polyps before age 50.       Have a cholesterol test every 5 years.       Have a diabetes test (fasting glucose) after age 45. If you are at risk for diabetes, you should have this test every 3 years.    Shots: Get a flu shot each year. Get a tetanus shot every 10 years.     Nutrition:     Eat at least 5 servings of fruits and vegetables each day.    Eat whole-grain bread, whole-wheat pasta and brown rice instead of white grains and rice.    Get adequate Calcium and Vitamin D.      Lifestyle    Exercise at least 150 minutes a week (an average of 30 minutes a day, 5 days a week). This will help you control your weight and prevent disease.    Limit alcohol to one drink per day.    No smoking.     Wear sunscreen to prevent skin cancer.    See your dentist every six months for an exam and cleaning.

## 2019-07-15 NOTE — NURSING NOTE
Screening Questionnaire for Adult Immunization    Are you sick today?   No   Do you have allergies to medications, food, a vaccine component or latex?   No   Have you ever had a serious reaction after receiving a vaccination?   No   Do you have a long-term health problem with heart disease, lung disease, asthma, kidney disease, metabolic disease (e.g. diabetes), anemia, or other blood disorder?   No   Do you have cancer, leukemia, HIV/AIDS, or any other immune system problem?   No   In the past 3 months, have you taken medications that affect  your immune system, such as prednisone, other steroids, or anticancer drugs; drugs for the treatment of rheumatoid arthritis, Crohn s disease, or psoriasis; or have you had radiation treatments?   No   Have you had a seizure, or a brain or other nervous system problem?   No   During the past year, have you received a transfusion of blood or blood     products, or been given immune (gamma) globulin or antiviral drug?   No   For women: Are you pregnant or is there a chance you could become        pregnant during the next month?   No   Have you received any vaccinations in the past 4 weeks?   No     Immunization questionnaire answers were all negative.        Per orders of Dr. Nava, injection of Tenivac given by Kamila Ramon. Patient instructed to remain in clinic for 15 minutes afterwards, and to report any adverse reaction to me immediately.       Screening performed by Kamila Ramon on 7/15/2019 at 11:00 AM.

## 2019-07-15 NOTE — PROGRESS NOTES
SUBJECTIVE:   CC: Maria Esther Haro is an 44 year old woman who presents for preventive health visit.     Healthy Habits:     Getting at least 3 servings of Calcium per day:  NO    Bi-annual eye exam:  Yes    Dental care twice a year:  NO    Sleep apnea or symptoms of sleep apnea:  None    Diet:  Regular (no restrictions)    Frequency of exercise:  None    Taking medications regularly:  Yes    Medication side effects:  None    PHQ-2 Total Score: 2    Additional concerns today:  No    Bipolar affective disorder - no side effects, doing well     PHQ-9 SCORE 12/26/2018 1/21/2019 7/15/2019   PHQ-9 Total Score - - -   PHQ-9 Total Score MyChart - 15 (Moderately severe depression) 3 (Minimal depression)   PHQ-9 Total Score 11 15 3       Today's PHQ-2 Score:   PHQ-2 ( 1999 [employer]) 7/10/2019   Q1: Little interest or pleasure in doing things 1   Q2: Feeling down, depressed or hopeless 1   PHQ-2 Score 2   Q1: Little interest or pleasure in doing things Several days   Q2: Feeling down, depressed or hopeless Several days   PHQ-2 Score 2       Abuse: Current or Past(Physical, Sexual or Emotional)- No  Do you feel safe in your environment? Yes    Social History     Tobacco Use     Smoking status: Current Every Day Smoker     Years: 5.00     Types: Cigarettes     Smokeless tobacco: Never Used     Tobacco comment: 2-3 cigs/day   Substance Use Topics     Alcohol use: No     Comment: none     If you drink alcohol do you typically have >3 drinks per day or >7 drinks per week? No    Alcohol Use 7/10/2019   Prescreen: >3 drinks/day or >7 drinks/week? Not Applicable       Reviewed orders with patient.  Reviewed health maintenance and updated orders accordingly - Yes      Mammogram -  Ordered     Pertinent mammograms are reviewed under the imaging tab.  History of abnormal Pap smear: YES - updated in Problem List and Health Maintenance accordingly  PAP / HPV Latest Ref Rng & Units 10/31/2018 2/10/2016 5/16/2011   PAP - NIL NIL ASC-US(A)  "  HPV 16 DNA NEG:Negative Negative Negative -   HPV 18 DNA NEG:Negative Negative Negative -   OTHER HR HPV NEG:Negative Negative Negative -     Reviewed and updated as needed this visit by clinical staff         Reviewed and updated as needed this visit by Provider            Review of Systems   Constitutional: Negative for chills and fever.   HENT: Negative for congestion, ear pain, hearing loss and sore throat.    Eyes: Positive for pain. Negative for visual disturbance.   Respiratory: Negative for cough and shortness of breath.    Cardiovascular: Negative for chest pain, palpitations and peripheral edema.   Gastrointestinal: Negative for abdominal pain, constipation, diarrhea, heartburn, hematochezia and nausea.   Breasts:  Negative for tenderness, breast mass and discharge.   Genitourinary: Negative for dysuria, frequency, genital sores, hematuria, pelvic pain, urgency, vaginal bleeding and vaginal discharge.   Musculoskeletal: Positive for arthralgias. Negative for joint swelling and myalgias.   Skin: Negative for rash.   Neurological: Negative for dizziness, weakness, headaches and paresthesias.   Psychiatric/Behavioral: Positive for mood changes. The patient is not nervous/anxious.    recently seen by eye doctor   Cervical radiculopathy - improved  For the last four months she has a small cyst on the right thumb. Sometimes she notes pain if she moves it too much. Sometimes it gets bigger and other times it gets smaller No drainage. No trauma.        OBJECTIVE:   Physical Exam   /85   Pulse 104   Temp 96.8  F (36  C) (Tympanic)   Ht 1.626 m (5' 4\")   Wt 63.7 kg (140 lb 8 oz)   LMP 07/08/2019   SpO2 99%   BMI 24.12 kg/m    GENERAL: healthy, alert and no distress  EYES: Eyes grossly normal to inspection, conjunctivae and sclerae normal  HENT:  nose and mouth without ulcers or lesions  NECK: no adenopathy, no asymmetry, masses, or scars and thyroid normal to palpation  RESP: lungs clear to " "auscultation - no rales, rhonchi or wheezes  CV: regular rate and rhythm, normal S1 S2  ABDOMEN: soft, nontender, no hepatosplenomegaly, no masses and bowel sounds normal  MS: no gross musculoskeletal defects noted, no edema  SKIN: right thumb nodule on the PIP joint   NEURO: Normal strength and tone, mentation intact and speech normal  PSYCH: mentation appears normal, affect normal    Diagnostic Test Results:  none     ASSESSMENT/PLAN:     1. Routine general medical examination at a health care facility  - see below     2. Bipolar affective disorder, remission status unspecified (H)  - stable   - medication refilled, referred to psychiatry   - MENTAL HEALTH REFERRAL  - Adult; Psychiatry and Medication Management; Psychiatry; [facility]: formerly [facility] [phone].  Medication management & future refills will be returned to [facility] PCP upon completion of evaluation; We chetan...      3. Ganglion cyst of finger of left hand  - discussed etiology  - referred to ortho for further evaluation   - ORTHO  REFERRAL    4. Breast cancer screening  - *MA Screening Digital Bilateral; Future    5. Need for Td vaccine  - TD PRESERV FREE, IM (7+ YRS)      COUNSELING:  Reviewed preventive health counseling, as reflected in patient instructions    Estimated body mass index is 22.47 kg/m  as calculated from the following:    Height as of 3/30/19: 1.651 m (5' 5\").    Weight as of 3/30/19: 61.2 kg (135 lb).         reports that she has been smoking cigarettes.  She has smoked for the past 5.00 years. She has never used smokeless tobacco.      Counseling Resources:  ATP IV Guidelines  Pooled Cohorts Equation Calculator  Breast Cancer Risk Calculator  FRAX Risk Assessment  ICSI Preventive Guidelines  Dietary Guidelines for Americans, 2010  USDA's MyPlate  ASA Prophylaxis  Lung CA Screening    Jose Luis Nava MD  [facility]  "

## 2019-07-16 ASSESSMENT — ANXIETY QUESTIONNAIRES: GAD7 TOTAL SCORE: 4

## 2019-08-20 DIAGNOSIS — F31.32 BIPOLAR AFFECTIVE DISORDER, CURRENTLY DEPRESSED, MODERATE (H): ICD-10-CM

## 2019-08-20 NOTE — TELEPHONE ENCOUNTER
"Requested Prescriptions   Pending Prescriptions Disp Refills     topiramate (TOPAMAX) 50 MG tablet [Pharmacy Med Name: TOPIRAMATE 50MG TABLETS] 30 tablet 0     Sig: TAKE 1 TABLET(50 MG) BY MOUTH DAILY       Anti-Seizure Meds Protocol  Failed - 8/20/2019 10:36 AM        Failed - Review Authorizing provider's last note.      Refer to last progress notes: confirm request is for original authorizing provider (cannot be through other providers).  Last Written Prescription Date:  7/11/2019  Last Fill Quantity: 30 tablet,  # refills: 0   Last Office Visit: 7/15/2019   Future Office Visit:    Next 5 appointments (look out 90 days)    Sep 16, 2019 10:45 AM CDT  Return Visit with Tripp Thomas, CNP  [facility] ([facility]) [address] [facility] [address] [phone]              Passed - Recent (12 mo) or future (30 days) visit within the authorizing provider's specialty     Patient had office visit in the last 12 months or has a visit in the next 30 days with authorizing provider or within the authorizing provider's specialty.  See \"Patient Info\" tab in inbasket, or \"Choose Columns\" in Meds & Orders section of the refill encounter.            Passed - Normal CBC on file in past 26 months     Recent Labs   Lab Test 01/21/19  1124   WBC 11.0   RBC 4.80   HGB 13.9   HCT 43.7              Passed - Normal ALT or AST on file in past 26 months     Recent Labs   Lab Test 01/21/19  1124   ALT 19     Recent Labs   Lab Test 01/21/19  1124   AST 20           Passed - Normal platelet count on file in past 26 months     Recent Labs   Lab Test 01/21/19  1124              Passed - Medication is active on med list        Passed - No active pregnancy on record        Passed - No positive pregnancy test in last 12 months          "

## 2019-08-22 NOTE — TELEPHONE ENCOUNTER
"Routing refill request to provider for review/approval because:  --RN protocol is not defined yet as to how often labs are to be done.  Protocol says either every six month or every 26 months.  --Routing to provider to let us know if wants labs now.  --Last Office Visit: 7/15/2019 Elijah plan:   \"Medication management & future refills will be returned to [facility] PCP upon completion of evaluation; We chetan...\"      Future Office Visit:    Next 5 appointments (look out 90 days)    Sep 16, 2019 10:45 AM CDT  [facility] Visit with Tripp Thomas CNP  [facility] [address] [facility] [address] [phone]          PHQ-9 SCORE 12/26/2018 1/21/2019 7/15/2019   PHQ-9 Total Score - - -   PHQ-9 Total Score MyChart - 15 (Moderately severe depression) 3 (Minimal depression)   PHQ-9 Total Score 11 15 3               "

## 2019-08-23 RX ORDER — TOPIRAMATE 50 MG/1
TABLET, FILM COATED ORAL
Qty: 30 TABLET | Refills: 0 | Status: SHIPPED | OUTPATIENT
Start: 2019-08-23 | End: 2019-09-16

## 2019-09-16 ENCOUNTER — OFFICE VISIT (OUTPATIENT)
Dept: PSYCHIATRY | Facility: CLINIC | Age: 44
End: 2019-09-16
Attending: FAMILY MEDICINE
Payer: COMMERCIAL

## 2019-09-16 VITALS
RESPIRATION RATE: 12 BRPM | SYSTOLIC BLOOD PRESSURE: 121 MMHG | DIASTOLIC BLOOD PRESSURE: 79 MMHG | WEIGHT: 141 LBS | HEART RATE: 100 BPM | BODY MASS INDEX: 24.2 KG/M2 | OXYGEN SATURATION: 97 %

## 2019-09-16 DIAGNOSIS — F31.32 BIPOLAR AFFECTIVE DISORDER, CURRENTLY DEPRESSED, MODERATE (H): Primary | ICD-10-CM

## 2019-09-16 PROCEDURE — 99214 OFFICE O/P EST MOD 30 MIN: CPT | Performed by: NURSE PRACTITIONER

## 2019-09-16 RX ORDER — TOPIRAMATE 50 MG/1
50 TABLET, FILM COATED ORAL DAILY
Qty: 30 TABLET | Refills: 2 | Status: SHIPPED | OUTPATIENT
Start: 2019-09-16 | End: 2020-03-11

## 2019-09-16 ASSESSMENT — ANXIETY QUESTIONNAIRES
3. WORRYING TOO MUCH ABOUT DIFFERENT THINGS: SEVERAL DAYS
GAD7 TOTAL SCORE: 8
5. BEING SO RESTLESS THAT IT IS HARD TO SIT STILL: SEVERAL DAYS
6. BECOMING EASILY ANNOYED OR IRRITABLE: MORE THAN HALF THE DAYS
1. FEELING NERVOUS, ANXIOUS, OR ON EDGE: NOT AT ALL
2. NOT BEING ABLE TO STOP OR CONTROL WORRYING: SEVERAL DAYS
IF YOU CHECKED OFF ANY PROBLEMS ON THIS QUESTIONNAIRE, HOW DIFFICULT HAVE THESE PROBLEMS MADE IT FOR YOU TO DO YOUR WORK, TAKE CARE OF THINGS AT HOME, OR GET ALONG WITH OTHER PEOPLE: SOMEWHAT DIFFICULT
7. FEELING AFRAID AS IF SOMETHING AWFUL MIGHT HAPPEN: MORE THAN HALF THE DAYS

## 2019-09-16 ASSESSMENT — PATIENT HEALTH QUESTIONNAIRE - PHQ9
SUM OF ALL RESPONSES TO PHQ QUESTIONS 1-9: 7
5. POOR APPETITE OR OVEREATING: SEVERAL DAYS

## 2019-09-16 NOTE — Clinical Note
Hi, Dr. Nava. I followed up with your patient since I saw her originally in January. Seems to be doing better with Topamax, though her daily substance use is still a great item of concern. She remains uncommitted to seeking treatment at this time, but was interested to see a therapist, so I placed a referral. Hopefully that cleaves a further path towards harm reduction. I am leaving [place] in a few weeks, so am hopeful you can continue followup with your patient. If need be, she can be re-referrd back to [facility] if further consult is needed. Thanks and regards, Tripp

## 2019-09-16 NOTE — PROGRESS NOTES
"    Outpatient Psychiatric Progress Note    Name: Maria Esther Haro   : 1975                    Primary Care Provider: Jose Luis Nava MD   Therapist: None    PHQ-9 scores:  PHQ-9 SCORE 2019 7/15/2019 2019   PHQ-9 Total Score - - -   PHQ-9 Total Score MyChart 15 (Moderately severe depression) 3 (Minimal depression) -   PHQ-9 Total Score 15 3 7       STEVEN-7 scores:  STEVEN-7 SCORE 2019 7/15/2019 2019   Total Score 10 (moderate anxiety) 4 (minimal anxiety) -   Total Score 10 4 8         Patient Identification:  Patient is a 44 year old year old, single  White American female  who presents for return visit with me.  Patient is currently unemployed. Patient attended the session alone. Patient prefers to be called: \"Maria Esther\".    Interim History:  I last saw Maria Esther Haro for outpatient psychiatry Consultation on 19.     During that appointment, we reviewed her psychiatric history and ongoing GHB and meth dependence. She was interested to reinitiate Topamax, which in past helped with mood. She remained contemplative about seeking treatment for her substance use, but deferred on any referrals. She was to follow up with me in a month, but never did.    Current medications include:   Current Outpatient Medications   Medication Sig     ibuprofen (ADVIL/MOTRIN) 600 MG tablet Take 1 tablet (600 mg) by mouth every 6 hours as needed for moderate pain     topiramate (TOPAMAX) 50 MG tablet TAKE 1 TABLET(50 MG) BY MOUTH DAILY     No current facility-administered medications for this visit.        The [place] Prescription Monitoring Program has been reviewed and there are no concerns about diversionary activity for controlled substances at this time.      I was able to review most recent Primary Care Provider, specialty provider, and therapy visit notes that I have access to.     Today, patient reports she is here primarily for a refill of Topamax. Has been intermittently adherent to Topamax, but " when she was taking it consistently for 3 months at 50 mg daily she did find this to work well for her; mood was more level; sleep was better. States Topamax is a bit fatiguing. States she cannot reliably dose at night as she forgets, so takes in AM. Would like to continue Topamax.    Asked about her substance use, she says she is still using GHB daily; denies any recent overdoses. States she is not using as much meth, which she thinks is byproduct of Topamax treatment. Still contemplative about recovery and treatment; feels she'll have to pursue this at some point, is but is not yet ready to make this commitment.    She states an interest in connecting with a therapist.            Past Medical History:   Diagnosis Date     Alcohol abuse, in remission     Remission since 2003     ASCUS on Pap smear 5/16/11    HPV-pos. 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    Social history updates:  No updates to report    Substance use updates:  See above      Vital Signs:   /79 (BP Location: Right arm, Patient Position: Chair, Cuff Size: Adult Regular)   Pulse 100   Resp 12   Wt 64 kg (141 lb)   LMP 09/04/2019 (Approximate)   SpO2 97%   Breastfeeding? No   BMI 24.20 kg/m      Labs:  Most recent laboratory results reviewed and no new labs.    Review of Systems:  10 systems (general,  cardiovascular, respiratory, eyes, ENT, endocrine, GI, , M/S, neurological) were reviewed. Most pertinent finding(s) is/are: substance dependence. The remaining systems are all unremarkable    Mental Status Examination:     Appearance:  awake, alert and adequately groomed  Attitude:  cooperative   Eye Contact:  adequate  Gait and Station: Normal  Psychomotor Behavior:  intact station, gait and muscle tone  Oriented to:  time, person, and place  Attention Span and Concentration:  Normal  Speech:  clear, coherent  Mood:  OK   Affect:  appropriate and in normal range  Associations:  no loose associations  Thought Process:  logical, linear and goal oriented  Thought Content:  Appropriate to Interview  Recent and Remote Memory:  intact Not formally assessed. No amnesia.  Fund of Knowledge: appropriate  Insight:  good  Judgment:  intact  Impulse Control:  intact     Suicide Risk Assessment:  Today Maria Esther Haro denies suicidal ideation or self-harm impulses, but remains at elevated risk of overdose secondary to substance dependence. Therefore, based on all available evidence including the factors cited above, Maria Esther Haro does not appear to be at imminent risk for self-harm, does not meet criteria for a 72-hr hold, and therefore remains appropriate for ongoing outpatient level of care.  A thorough assessment of risk factors related to suicide and self-harm have been reviewed and are noted above. The patient convincingly denies acute suicidality on several occasions. Local community safety resources reviewed and printed for patient to use if needed. There was no deceit detected, and the patient presented in a manner that was believable.      DSM5  Diagnosis:  Other Unspecified and Specified Bipolar and Related Disorder 296.80 (F31.9) Unspecified Bipolar and Related Disorder  Substance-Related & Addictive Disorders Sedative, Hypnotic or Anxiolytic Related Disorder, .   304.10 (F13.20) Moderate  301.83 (F60.3)  "Borderline Personality Disorder    Medical comorbidities include:   Patient Active Problem List    Diagnosis Date Noted     Borderline personality disorder (H) 01/21/2019     Priority: Medium     Bipolar affective disorder, currently depressed, moderate (H) 01/21/2019     Priority: Medium     Substance use disorder 01/21/2019     Priority: Medium     HSV-2 (herpes simplex virus 2) infection 10/31/2018     Priority: Medium     Diagnosed at 20         ASCUS on Pap smear 05/16/2011     Priority: Medium     5/16/11 Pap - Ascus, + HPV 83 . Unable to assign carcinogenicity. Pt to have repeat pap done in one year.(5/12) Type 83 considered low risk (10/2018)  09/21/12 Pt. Has not responded to telephone calls, letters, or certified letters, message sent to Usman Sousa for Pt. To be put in the inactive file.  09/26/12 Per Usman Sousa, pt. Put in the inactive file for pap tracking due to no response from pt., episode resolved.  02/10/16 DX NL pap, neg HPV, pt. Is to have co-testing in one year.  04/26/18 Patient is lost to follow-up.   10/31/18 NIL pap, neg HR HPV. Plan 1 year cotest - if normal, can go to routine screening         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      Priority: Medium     Since May 2009. However relapsed 10\"09.  Problem list name updated by automated process. Provider to review       Restless leg syndrome      Priority: Medium     Mild major depression (H)      Priority: Medium     Alcohol abuse, in remission      Priority: Medium     Remission since 2003           Assessment:  Maria Esther Haro endorses good mood benefit with Topamax, and seems to be tolerating this mostly well. She'd probably find it more tolerable to dose at night, but she says she cannot consistently do so. Her dosage could be adjusted upwards as warranted. If Topamax does not continue as a serviceable treatment, one can always consider Lamictal.    I registered my ongoing concern about her substance and the " very real risk of fatal overdose with GHB. She is presently contemplative about seeking recovery from her substance use, and is declining any treatment referral today.    She is interested in working with a psychotherapist, which hopefully will further her harm reduction efforts, and possible entry into recovery treatment.    Medication side effects and alternatives were reviewed. Health promotion activities recommended and reviewed today. All questions addressed. Education and counseling completed regarding risks and benefits of medications and psychotherapy options.    Treatment Plan:    Continue Topamax 50 mg daily    Referred to [facility] for psychotherapy    Continue all other medical directions per primary care provider.     Continue all other medications as reviewed per electronic medical record today.     Safety plan reviewed. To the Emergency Department as needed or call after hours crisis line at [phone] or [phone]. [place] Crisis Text [place]: Text [place] to 853896  or  Suicide LifeLine Chat: suicidePixelPlay.org/chat/    Follow up with primary care provider as planned or for acute medical concerns.      Crisis Resources:    National Suicide Prevention Lifeline: 803.770.5369 (TTY: 398.373.9441). Call anytime for help.  (www.suicidepreventionlifeline.org)  National [place] on Mental Illness (www.anthony.org): 772.355.3906 or 643-406-1130.   Mental Health Association (www.mentalhealth.org): 513.745.8477 or 396-424-6993.  [place] Crisis Text Line: Text [place] to 942758  Suicide LifeLine Chat: suicidePixelPlay.org/chat      Administrative Billing:   Time spent with patient was 30 minutes and greater than 50% of time or 20 minutes was spent in counseling and coordination of care regarding above diagnoses and treatment plan.    Patient Status:  The patient is being returned to the referring provider for ongoing care and medication prescribing.  The patient can be referred back to this service for  further consultation as needed.    Signed:   Tripp Thomas, SHARLENE   Psychiatry

## 2019-09-17 ASSESSMENT — ANXIETY QUESTIONNAIRES: GAD7 TOTAL SCORE: 8

## 2019-09-27 ENCOUNTER — OFFICE VISIT (OUTPATIENT)
Dept: FAMILY MEDICINE | Facility: CLINIC | Age: 44
End: 2019-09-27
Payer: COMMERCIAL

## 2019-09-27 VITALS
OXYGEN SATURATION: 98 % | RESPIRATION RATE: 22 BRPM | DIASTOLIC BLOOD PRESSURE: 84 MMHG | SYSTOLIC BLOOD PRESSURE: 120 MMHG | BODY MASS INDEX: 24.37 KG/M2 | HEART RATE: 100 BPM | TEMPERATURE: 98.5 F | WEIGHT: 142 LBS

## 2019-09-27 DIAGNOSIS — B96.89 BV (BACTERIAL VAGINOSIS): ICD-10-CM

## 2019-09-27 DIAGNOSIS — R07.0 THROAT PAIN: ICD-10-CM

## 2019-09-27 DIAGNOSIS — J02.0 STREPTOCOCCAL PHARYNGITIS: Primary | ICD-10-CM

## 2019-09-27 DIAGNOSIS — R35.0 URINARY FREQUENCY: ICD-10-CM

## 2019-09-27 DIAGNOSIS — N76.0 BV (BACTERIAL VAGINOSIS): ICD-10-CM

## 2019-09-27 DIAGNOSIS — N89.8 VAGINAL ITCHING: ICD-10-CM

## 2019-09-27 DIAGNOSIS — R82.90 NONSPECIFIC FINDING ON EXAMINATION OF URINE: ICD-10-CM

## 2019-09-27 DIAGNOSIS — B37.31 YEAST INFECTION OF THE VAGINA: ICD-10-CM

## 2019-09-27 LAB
ALBUMIN UR-MCNC: NEGATIVE MG/DL
APPEARANCE UR: CLEAR
BACTERIA #/AREA URNS HPF: ABNORMAL /HPF
BILIRUB UR QL STRIP: NEGATIVE
COLOR UR AUTO: YELLOW
DEPRECATED S PYO AG THROAT QL EIA: ABNORMAL
GLUCOSE UR STRIP-MCNC: NEGATIVE MG/DL
HGB UR QL STRIP: ABNORMAL
KETONES UR STRIP-MCNC: NEGATIVE MG/DL
LEUKOCYTE ESTERASE UR QL STRIP: NEGATIVE
NITRATE UR QL: POSITIVE
NON-SQ EPI CELLS #/AREA URNS LPF: ABNORMAL /LPF
PH UR STRIP: 6 PH (ref 5–7)
RBC #/AREA URNS AUTO: ABNORMAL /HPF
SOURCE: ABNORMAL
SP GR UR STRIP: 1.02 (ref 1–1.03)
SPECIMEN SOURCE: ABNORMAL
SPECIMEN SOURCE: ABNORMAL
UROBILINOGEN UR STRIP-ACNC: 1 EU/DL (ref 0.2–1)
WBC #/AREA URNS AUTO: ABNORMAL /HPF
WET PREP SPEC: ABNORMAL

## 2019-09-27 PROCEDURE — 87880 STREP A ASSAY W/OPTIC: CPT | Performed by: PHYSICIAN ASSISTANT

## 2019-09-27 PROCEDURE — 87086 URINE CULTURE/COLONY COUNT: CPT | Performed by: PHYSICIAN ASSISTANT

## 2019-09-27 PROCEDURE — 87210 SMEAR WET MOUNT SALINE/INK: CPT | Performed by: PHYSICIAN ASSISTANT

## 2019-09-27 PROCEDURE — 81001 URINALYSIS AUTO W/SCOPE: CPT | Performed by: PHYSICIAN ASSISTANT

## 2019-09-27 PROCEDURE — 87186 SC STD MICRODIL/AGAR DIL: CPT | Performed by: PHYSICIAN ASSISTANT

## 2019-09-27 PROCEDURE — 87088 URINE BACTERIA CULTURE: CPT | Performed by: PHYSICIAN ASSISTANT

## 2019-09-27 PROCEDURE — 99214 OFFICE O/P EST MOD 30 MIN: CPT | Performed by: PHYSICIAN ASSISTANT

## 2019-09-27 RX ORDER — FLUCONAZOLE 150 MG/1
150 TABLET ORAL ONCE
Qty: 2 TABLET | Refills: 1 | Status: SHIPPED | OUTPATIENT
Start: 2019-09-27 | End: 2019-12-10

## 2019-09-27 RX ORDER — AMOXICILLIN 500 MG/1
500 CAPSULE ORAL 2 TIMES DAILY
Qty: 20 CAPSULE | Refills: 0 | Status: SHIPPED | OUTPATIENT
Start: 2019-09-27 | End: 2019-12-10

## 2019-09-27 RX ORDER — METRONIDAZOLE 500 MG/1
500 TABLET ORAL 2 TIMES DAILY
Qty: 14 TABLET | Refills: 0 | Status: SHIPPED | OUTPATIENT
Start: 2019-09-27 | End: 2019-12-11

## 2019-09-27 NOTE — PATIENT INSTRUCTIONS
Encouraged patient to push fluids, may use Pyridium (urlistat/azo) and/or cranberry juice/pills OTC as directed. Discussed importance of good personal hygiene and frequent urination.  Prescription for oral antibiotic sent to pharmacy to cover for strep and UTI as well.  Prescription for yeast infection for now and after oral antibiotic sent to pharmacy.  Encouraged mucinex/guafenisin, warm salt water gargles, cepacol spray, soothers/lozenges, sinus rinses (neilmed), flonase (2 sprays per nostril daily x 2 weeks), vitamin c, fluids and rest.     Call or return to clinic prn if these symptoms worsen or fail to improve as anticipated.

## 2019-09-27 NOTE — PROGRESS NOTES
Subjective     Maria Esther Haro is a 44 year old female who presents to clinic today for the following health issues:    HPI     RESPIRATORY SYMPTOMS      Duration: 2-3 weeks    Description  nasal congestion, sore throat, facial pain/pressure, cough and both ears feel plug    Severity: moderate    Accompanying signs and symptoms: cannot smell    History (predisposing factors):  none    Therapies tried and outcome:  none    URINARY TRACT SYMPTOMS    Duration: 1 month  Description  dysuria, frequency, urgency and odor  Intensity:  moderate  Accompanying signs and symptoms:  Fever/chills: YES- chills  Flank pain YES  Nausea and vomiting: YES- vomiting  Vaginal symptoms: itching  Abdominal/Pelvic Pain: YES  History  History of frequent UTI's: YES  History of kidney stones: no   Sexually Active: YES  Possibility of pregnancy: No  Precipitating or alleviating factors: None  Therapies tried and outcome: none             Patient Active Problem List   Diagnosis     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     Restless leg syndrome     Mild major depression (H)     Alcohol abuse, in remission     ASCUS on Pap smear     HSV-2 (herpes simplex virus 2) infection     Borderline personality disorder (H)     Bipolar affective disorder, currently depressed, moderate (H)     Substance use disorder     Past Surgical History:   Procedure Laterality Date     ABDOMEN SURGERY  2010    Csection     C ANESTH,SURG BREAST RECONSTRUCTIVE      Breast augmentation     C NONSPECIFIC PROCEDURE   &      x 3, previous tear in cervix     COSMETIC SURGERY  2007    Breast surgery       Social History     Tobacco Use     Smoking status: Current Every Day Smoker     Packs/day: 0.00     Years: 5.00     Pack years: 0.00     Types: Cigarettes     Smokeless tobacco: Current User     Tobacco comment: I'm using the vape   Substance Use Topics     Alcohol use: No     Comment: none     Family History   Adopted:  Yes   Problem Relation Age of Onset     Unknown/Adopted No family hx of         Pt is adopted     Macular Degeneration No family hx of      Glaucoma No family hx of          Current Outpatient Medications   Medication Sig Dispense Refill     amoxicillin (AMOXIL) 500 MG capsule Take 1 capsule (500 mg) by mouth 2 times daily 20 capsule 0     fluconazole (DIFLUCAN) 150 MG tablet Take 1 tablet (150 mg) by mouth once for 1 dose - may repeat after antibiotics complete 2 tablet 1     metroNIDAZOLE (FLAGYL) 500 MG tablet Take 1 tablet (500 mg) by mouth 2 times daily for 7 days 14 tablet 0     topiramate (TOPAMAX) 50 MG tablet Take 1 tablet (50 mg) by mouth daily 30 tablet 2     ibuprofen (ADVIL/MOTRIN) 600 MG tablet Take 1 tablet (600 mg) by mouth every 6 hours as needed for moderate pain (Patient not taking: Reported on 9/27/2019) 30 tablet 0     Allergies   Allergen Reactions     Acetaminophen Itching     No Clinical Screening - See Comments      Other reaction(s): Unknown     Oxycodone Itching     Oxycontin [Oxycodone Hcl] Itching     Percocet [Oxycodone-Acetaminophen] Itching     Vicodin [Acetaminophen] Itching       Reviewed and updated as needed this visit by Provider  Tobacco  Allergies  Meds  Problems  Med Hx  Surg Hx  Fam Hx         Review of Systems   ROS COMP: Constitutional, HEENT, cardiovascular, pulmonary, GI, , musculoskeletal, neuro, skin, endocrine and psych systems are negative, except as otherwise noted.      Objective    /84 (BP Location: Left arm, Patient Position: Sitting, Cuff Size: Adult Regular)   Pulse 100   Temp 98.5  F (36.9  C) (Oral)   Resp 22   Wt 64.4 kg (142 lb)   LMP 09/04/2019 (Approximate)   SpO2 98%   BMI 24.37 kg/m    Body mass index is 24.37 kg/m .  Physical Exam   GENERAL: healthy, alert and no distress  EYES: Eyes grossly normal to inspection, PERRL and conjunctivae and sclerae normal  HENT: ear canals and TM's normal, nose and mouth without ulcers or  lesions  NECK: no adenopathy, no asymmetry, masses, or scars and thyroid normal to palpation  RESP: lungs clear to auscultation - no rales, rhonchi or wheezes  CV: regular rate and rhythm, normal S1 S2, no S3 or S4, no murmur, click or rub, no peripheral edema and peripheral pulses strong  ABDOMEN: soft, nontender, no hepatosplenomegaly, no masses and bowel sounds normal  PSYCH: mentation appears normal, affect normal/bright    Diagnostic Test Results:  Labs reviewed in Epic  Results for orders placed or performed in visit on 09/27/19   *UA reflex to Microscopic and Culture ([place] and [facility] (except [place] and [place])   Result Value Ref Range    Color Urine Yellow     Appearance Urine Clear     Glucose Urine Negative NEG^Negative mg/dL    Bilirubin Urine Negative NEG^Negative    Ketones Urine Negative NEG^Negative mg/dL    Specific Gravity Urine 1.025 1.003 - 1.035    Blood Urine Trace (A) NEG^Negative    pH Urine 6.0 5.0 - 7.0 pH    Protein Albumin Urine Negative NEG^Negative mg/dL    Urobilinogen Urine 1.0 0.2 - 1.0 EU/dL    Nitrite Urine Positive (A) NEG^Negative    Leukocyte Esterase Urine Negative NEG^Negative    Source Midstream Urine    Urine Microscopic   Result Value Ref Range    WBC Urine 5-10 (A) OTO5^0 - 5 /HPF    RBC Urine O - 2 OTO2^O - 2 /HPF    Squamous Epithelial /LPF Urine Few FEW^Few /LPF    Bacteria Urine Many (A) NEG^Negative /HPF   Wet prep   Result Value Ref Range    Specimen Description Vagina     Wet Prep No Trichomonas seen     Wet Prep Yeast seen  Rare   (A)     Wet Prep Clue cells seen  Few   (A)     Wet Prep WBC'S seen  Few      Strep, Rapid Screen   Result Value Ref Range    Specimen Description Throat     Rapid Strep A Screen (A)      POSITIVE: Group A Streptococcal antigen detected by immunoassay.            Assessment & Plan     (J02.0) Streptococcal pharyngitis  (primary encounter diagnosis)  Comment: new onset  Plan: amoxicillin (AMOXIL) 500 MG capsule         Prescription for oral antibiotic sent to pharmacy; attempt to find one to cover both strep and UTI; Encouraged mucinex/guafenisin, warm salt water gargles, cepacol spray, soothers/lozenges, sinus rinses (neilmed), flonase (2 sprays per nostril daily x 2 weeks), vitamin c, fluids and rest.  May alternate tylenol and NSAIDS (ibuprofen, advil, aleve type products) every 4-6 hours for the next few days as needed.    Return to clinic with any worsening or changes in symptoms.     (B37.3) Yeast infection of the vagina  Comment: new onset  Plan: fluconazole (DIFLUCAN) 150 MG tablet        Prescription for fluconazole for before and after above treatment    (N76.0,  B96.89) BV (bacterial vaginosis)  Comment: new onset  Plan: metroNIDAZOLE (FLAGYL) 500 MG tablet        Prescription sent to pharmacy for after above treatment      ICD-10-CM    1. Streptococcal pharyngitis J02.0 amoxicillin (AMOXIL) 500 MG capsule   2. Yeast infection of the vagina B37.3 fluconazole (DIFLUCAN) 150 MG tablet   3. BV (bacterial vaginosis) N76.0 metroNIDAZOLE (FLAGYL) 500 MG tablet    B96.89    4. Urinary frequency R35.0 *UA reflex to Microscopic and Culture ([place] [facility] (except [place] and [place])     Urine Microscopic   5. Vaginal itching N89.8 Wet prep   6. Throat pain R07.0 Strep, Rapid Screen   7. Nonspecific finding on examination of urine R82.90 Urine Culture Aerobic Bacterial        Tobacco Cessation:   reports that she has been smoking cigarettes. She has been smoking about 0.00 packs per day for the past 5.00 years. She uses smokeless tobacco.  Tobacco Cessation Action Plan: Information offered: Patient not interested at this time    Patient Instructions   Encouraged patient to push fluids, may use Pyridium (urlistat/azo) and/or cranberry juice/pills OTC as directed. Discussed importance of good personal hygiene and frequent urination.  Prescription for oral antibiotic sent to pharmacy to cover for strep and UTI as  well.  Prescription for yeast infection for now and after oral antibiotic sent to pharmacy.  Encouraged mucinex/guafenisin, warm salt water gargles, cepacol spray, soothers/lozenges, sinus rinses (neilmed), flonase (2 sprays per nostril daily x 2 weeks), vitamin c, fluids and rest.     Call or return to clinic prn if these symptoms worsen or fail to improve as anticipated.       Return in about 1 year (around 9/27/2020) for Routine visit, or sooner with worsening symptoms.    Hyun Beltran PA-C  [facility]

## 2019-09-29 LAB
BACTERIA SPEC CULT: ABNORMAL
SPECIMEN SOURCE: ABNORMAL

## 2019-10-01 ENCOUNTER — TELEPHONE (OUTPATIENT)
Dept: FAMILY MEDICINE | Facility: CLINIC | Age: 44
End: 2019-10-01

## 2019-10-01 DIAGNOSIS — N39.0 URINARY TRACT INFECTION WITHOUT HEMATURIA, SITE UNSPECIFIED: Primary | ICD-10-CM

## 2019-10-01 NOTE — TELEPHONE ENCOUNTER
"    Please call patient with the following message:   Urine culture with some resistance, but not to specific antibiotic given - please check on urinary symptoms especially though - may still need different oral antibiotic for this.   Thanks   Hyun \"Cam\" LETY Beltran       Left message on answering machine for patient to call clinic triage.  KITTY Grajeda    "

## 2019-10-01 NOTE — TELEPHONE ENCOUNTER
Pt returning phone call to RN.   Please advise. Pt states she will stay near phone.    Tereza ZAMARRIPA     [facility]

## 2019-10-01 NOTE — TELEPHONE ENCOUNTER
Per patient her urinary symptoms are the same.  Has not noticed improvement.  Pharmacy loaded if you want to change medications.  Marilou Ellington RN

## 2019-10-01 NOTE — TELEPHONE ENCOUNTER
Left message on machine to call back  Ask to speak to an RN, let them know it's a return call.    Leave a number and time that you can be reached.   Marilou Ellington RN

## 2019-10-02 ENCOUNTER — OFFICE VISIT (OUTPATIENT)
Dept: FAMILY MEDICINE | Facility: CLINIC | Age: 44
End: 2019-10-02
Payer: COMMERCIAL

## 2019-10-02 VITALS
WEIGHT: 143 LBS | SYSTOLIC BLOOD PRESSURE: 128 MMHG | BODY MASS INDEX: 24.55 KG/M2 | TEMPERATURE: 97.1 F | DIASTOLIC BLOOD PRESSURE: 80 MMHG | OXYGEN SATURATION: 100 % | HEART RATE: 100 BPM

## 2019-10-02 DIAGNOSIS — J02.0 STREPTOCOCCAL PHARYNGITIS: ICD-10-CM

## 2019-10-02 DIAGNOSIS — B37.31 CANDIDAL VULVOVAGINITIS: ICD-10-CM

## 2019-10-02 DIAGNOSIS — B96.89 BACTERIAL VAGINOSIS: ICD-10-CM

## 2019-10-02 DIAGNOSIS — N76.0 BACTERIAL VAGINOSIS: ICD-10-CM

## 2019-10-02 DIAGNOSIS — R09.82 POST-NASAL DRAINAGE: Primary | ICD-10-CM

## 2019-10-02 DIAGNOSIS — N39.0 URINARY TRACT INFECTION WITHOUT HEMATURIA, SITE UNSPECIFIED: ICD-10-CM

## 2019-10-02 PROCEDURE — 99214 OFFICE O/P EST MOD 30 MIN: CPT | Performed by: FAMILY MEDICINE

## 2019-10-02 RX ORDER — NITROFURANTOIN 25; 75 MG/1; MG/1
100 CAPSULE ORAL 2 TIMES DAILY
Qty: 14 CAPSULE | Refills: 0 | Status: SHIPPED | OUTPATIENT
Start: 2019-10-02 | End: 2019-12-11

## 2019-10-02 RX ORDER — PSEUDOEPHEDRINE HCL 120 MG/1
120 TABLET, FILM COATED, EXTENDED RELEASE ORAL EVERY MORNING
Qty: 5 TABLET | Refills: 0 | Status: SHIPPED | OUTPATIENT
Start: 2019-10-02 | End: 2019-12-11

## 2019-10-02 RX ORDER — FLUTICASONE PROPIONATE 50 MCG
2 SPRAY, SUSPENSION (ML) NASAL DAILY
Qty: 16 G | Refills: 0 | Status: SHIPPED | OUTPATIENT
Start: 2019-10-02 | End: 2019-12-11

## 2019-10-02 NOTE — TELEPHONE ENCOUNTER
Patient called to inquire about the status of this request. States her SX have taken a turn for the worst and is really hoping a medication can be prescribed. Please assist. Thanks!

## 2019-10-02 NOTE — PROGRESS NOTES
Subjective     Maria Esther Haro is a 44 year old female who presents to clinic today for the following health issues:    HPI   Acute Illness   Acute illness concerns: cough post nasal drip  Onset: 1 day     Fever: no     Chills/Sweats: YES    Headache (location?): no     Sinus Pressure:no    Conjunctivitis:  no    Ear Pain: YES- itching    Rhinorrhea: no     Congestion: no     Sore Throat: YES     Cough: YES-productive of clear sputum    Wheeze: no     Decreased Appetite: yes    Nausea: YES    Vomiting: YES    Diarrhea:  no     Dysuria/Freq.: no     Fatigue/Achiness: YES    Sick/Strep Exposure: has strep, started treatment on 09-         Today she started experiencing PND. She is also tired.     Reviewed and updated as needed this visit by Provider         Review of Systems   ROS COMP: Constitutional, HEENT, cardiovascular, pulmonary, gi and gu systems are negative, except as otherwise noted.      Objective    LMP 09/04/2019 (Approximate)   There is no height or weight on file to calculate BMI.  Physical Exam   /80   Pulse 100   Temp 97.1  F (36.2  C) (Oral)   Wt 64.9 kg (143 lb)   LMP 09/04/2019 (Approximate)   SpO2 100%   BMI 24.55 kg/m    GENERAL: healthy, alert and no distress  EYES: Eyes grossly normal to inspection  HENT: nose and mouth without ulcers or lesions  NECK: no adenopathy  RESP: lungs clear to auscultation - no rales, rhonchi or wheezes  CV: regular rate and rhythm, normal S1 S2    Diagnostic Test Results:  Labs reviewed in Epic        Assessment & Plan     1. Post-nasal drainage  - advised to start with Flonase and if symptoms are not improving after 2-3 days then to use Sudafed QAM  - fluticasone (FLONASE) 50 MCG/ACT nasal spray; Spray 2 sprays into both nostrils daily  Dispense: 16 g; Refill: 0  - pseudoePHEDrine (SUDAFED) 120 MG 12 hr tablet; Take 1 tablet (120 mg) by mouth every morning  Dispense: 5 tablet; Refill: 0    2. Streptococcal pharyngitis  - Pt is currently being tx  for BV, UTI and strep. I advised to start probiotics and to f/u for wet prep if experiencing any symptoms concerning for vaginitis.     3. Urinary tract infection without hematuria, site unspecified      4. Bacterial vaginosis      5. Candidal vulvovaginitis      Return in about 1 week (around 10/9/2019) for sympotms are not improving.    Jose Luis Nava MD  [facility]

## 2019-10-02 NOTE — TELEPHONE ENCOUNTER
Called patient, relayed provider message below, patient verbalized understanding. Patient states she has appointment scheduled this afternoon, so she will plan to discuss with provider at that appointment    Oumou Del Real RN  Triage Nurse

## 2019-10-02 NOTE — TELEPHONE ENCOUNTER
Cam Beltran PA/DOD:   Please see phone message below.    Patient reports worsening symptoms    Pharmacy cued    Please advise    Thank You!  Oumou Del Real, KITTY  Triage Nurse

## 2019-10-02 NOTE — TELEPHONE ENCOUNTER
Continue amoxicillin as prescribed.  Add nitrofurantoin twice per day for 7 days. Signed.   No easily available oral antibiotics that would cover both  UTI and strep and she has to take 2 different antibiotics with her UTI result.

## 2019-10-02 NOTE — PATIENT INSTRUCTIONS
Please start probiotics daily while on antibiotic   Flonase 2 sprays in each nostril once daily  If symptoms are not improving in 2 to 3 days then you can start sudafed once daily in the morning  Please follow up in clinic if you have any vaginal itching or drainage after completing  antibiotics.

## 2019-10-29 ENCOUNTER — HEALTH MAINTENANCE LETTER (OUTPATIENT)
Age: 44
End: 2019-10-29

## 2019-11-11 ENCOUNTER — MYC MEDICAL ADVICE (OUTPATIENT)
Dept: FAMILY MEDICINE | Facility: CLINIC | Age: 44
End: 2019-11-11

## 2019-11-12 DIAGNOSIS — D31.32 CHOROIDAL NEVUS OF LEFT EYE: Primary | ICD-10-CM

## 2019-11-14 ENCOUNTER — TELEPHONE (OUTPATIENT)
Dept: OBGYN | Facility: CLINIC | Age: 44
End: 2019-11-14

## 2019-11-14 NOTE — TELEPHONE ENCOUNTER
Pt is past due for f/u pap smear.  No answer and VM not set up. Will try again.    Nalini Culp  Pap Tracking

## 2019-12-10 ENCOUNTER — OFFICE VISIT (OUTPATIENT)
Dept: FAMILY MEDICINE | Facility: CLINIC | Age: 44
End: 2019-12-10
Payer: COMMERCIAL

## 2019-12-10 VITALS
TEMPERATURE: 98.8 F | SYSTOLIC BLOOD PRESSURE: 130 MMHG | HEART RATE: 103 BPM | OXYGEN SATURATION: 98 % | DIASTOLIC BLOOD PRESSURE: 80 MMHG | WEIGHT: 145 LBS | BODY MASS INDEX: 24.89 KG/M2

## 2019-12-10 DIAGNOSIS — F13.20: ICD-10-CM

## 2019-12-10 DIAGNOSIS — Z87.440 PERSONAL HISTORY OF URINARY TRACT INFECTION: ICD-10-CM

## 2019-12-10 DIAGNOSIS — F15.10 METHAMPHETAMINE ABUSE (H): ICD-10-CM

## 2019-12-10 DIAGNOSIS — L98.9 SKIN PROBLEM: ICD-10-CM

## 2019-12-10 DIAGNOSIS — B96.89 BACTERIAL VAGINOSIS: ICD-10-CM

## 2019-12-10 DIAGNOSIS — Z01.411 ENCOUNTER FOR GYNECOLOGICAL EXAMINATION WITH ABNORMAL FINDING: ICD-10-CM

## 2019-12-10 DIAGNOSIS — B37.31 CANDIDAL VULVOVAGINITIS: ICD-10-CM

## 2019-12-10 DIAGNOSIS — M79.10 MYALGIA: ICD-10-CM

## 2019-12-10 DIAGNOSIS — N76.0 BACTERIAL VAGINOSIS: ICD-10-CM

## 2019-12-10 DIAGNOSIS — R60.0 LEG EDEMA: Primary | ICD-10-CM

## 2019-12-10 DIAGNOSIS — N89.8 VAGINAL ITCHING: ICD-10-CM

## 2019-12-10 LAB
ALBUMIN SERPL-MCNC: 3.6 G/DL (ref 3.4–5)
ALBUMIN UR-MCNC: NEGATIVE MG/DL
ALP SERPL-CCNC: 80 U/L (ref 40–150)
ALT SERPL W P-5'-P-CCNC: 20 U/L (ref 0–50)
ANION GAP SERPL CALCULATED.3IONS-SCNC: 6 MMOL/L (ref 3–14)
APPEARANCE UR: CLEAR
AST SERPL W P-5'-P-CCNC: 16 U/L (ref 0–45)
BACTERIA #/AREA URNS HPF: ABNORMAL /HPF
BILIRUB SERPL-MCNC: 0.3 MG/DL (ref 0.2–1.3)
BILIRUB UR QL STRIP: NEGATIVE
BUN SERPL-MCNC: 8 MG/DL (ref 7–30)
CALCIUM SERPL-MCNC: 9.2 MG/DL (ref 8.5–10.1)
CHLORIDE SERPL-SCNC: 108 MMOL/L (ref 94–109)
CK SERPL-CCNC: 95 U/L (ref 30–225)
CO2 SERPL-SCNC: 26 MMOL/L (ref 20–32)
COLOR UR AUTO: YELLOW
CREAT SERPL-MCNC: 0.6 MG/DL (ref 0.52–1.04)
CRP SERPL-MCNC: <2.9 MG/L (ref 0–8)
DEPRECATED CALCIDIOL+CALCIFEROL SERPL-MC: 36 UG/L (ref 20–75)
ERYTHROCYTE [DISTWIDTH] IN BLOOD BY AUTOMATED COUNT: 13.5 % (ref 10–15)
ERYTHROCYTE [SEDIMENTATION RATE] IN BLOOD BY WESTERGREN METHOD: 8 MM/H (ref 0–20)
GFR SERPL CREATININE-BSD FRML MDRD: >90 ML/MIN/{1.73_M2}
GLUCOSE SERPL-MCNC: 106 MG/DL (ref 70–99)
GLUCOSE UR STRIP-MCNC: NEGATIVE MG/DL
HCT VFR BLD AUTO: 43.4 % (ref 35–47)
HGB BLD-MCNC: 13.6 G/DL (ref 11.7–15.7)
HGB UR QL STRIP: ABNORMAL
KETONES UR STRIP-MCNC: ABNORMAL MG/DL
LEUKOCYTE ESTERASE UR QL STRIP: NEGATIVE
MCH RBC QN AUTO: 29.2 PG (ref 26.5–33)
MCHC RBC AUTO-ENTMCNC: 31.3 G/DL (ref 31.5–36.5)
MCV RBC AUTO: 93 FL (ref 78–100)
NITRATE UR QL: NEGATIVE
NON-SQ EPI CELLS #/AREA URNS LPF: ABNORMAL /LPF
PH UR STRIP: 5.5 PH (ref 5–7)
PLATELET # BLD AUTO: 301 10E9/L (ref 150–450)
POTASSIUM SERPL-SCNC: 3.9 MMOL/L (ref 3.4–5.3)
PROT SERPL-MCNC: 7.2 G/DL (ref 6.8–8.8)
RBC # BLD AUTO: 4.65 10E12/L (ref 3.8–5.2)
RBC #/AREA URNS AUTO: ABNORMAL /HPF
SODIUM SERPL-SCNC: 140 MMOL/L (ref 133–144)
SOURCE: ABNORMAL
SP GR UR STRIP: >1.03 (ref 1–1.03)
SPECIMEN SOURCE: ABNORMAL
TSH SERPL DL<=0.005 MIU/L-ACNC: 0.71 MU/L (ref 0.4–4)
UROBILINOGEN UR STRIP-ACNC: 1 EU/DL (ref 0.2–1)
WBC # BLD AUTO: 8.8 10E9/L (ref 4–11)
WBC #/AREA URNS AUTO: ABNORMAL /HPF
WET PREP SPEC: ABNORMAL

## 2019-12-10 PROCEDURE — 86200 CCP ANTIBODY: CPT | Performed by: FAMILY MEDICINE

## 2019-12-10 PROCEDURE — G0476 HPV COMBO ASSAY CA SCREEN: HCPCS | Performed by: FAMILY MEDICINE

## 2019-12-10 PROCEDURE — 86235 NUCLEAR ANTIGEN ANTIBODY: CPT | Performed by: FAMILY MEDICINE

## 2019-12-10 PROCEDURE — 99000 SPECIMEN HANDLING OFFICE-LAB: CPT | Performed by: FAMILY MEDICINE

## 2019-12-10 PROCEDURE — 81001 URINALYSIS AUTO W/SCOPE: CPT | Performed by: FAMILY MEDICINE

## 2019-12-10 PROCEDURE — 86140 C-REACTIVE PROTEIN: CPT | Performed by: FAMILY MEDICINE

## 2019-12-10 PROCEDURE — 99214 OFFICE O/P EST MOD 30 MIN: CPT | Performed by: FAMILY MEDICINE

## 2019-12-10 PROCEDURE — 82085 ASSAY OF ALDOLASE: CPT | Mod: 90 | Performed by: FAMILY MEDICINE

## 2019-12-10 PROCEDURE — 82306 VITAMIN D 25 HYDROXY: CPT | Performed by: FAMILY MEDICINE

## 2019-12-10 PROCEDURE — G0145 SCR C/V CYTO,THINLAYER,RESCR: HCPCS | Performed by: FAMILY MEDICINE

## 2019-12-10 PROCEDURE — 87086 URINE CULTURE/COLONY COUNT: CPT | Performed by: FAMILY MEDICINE

## 2019-12-10 PROCEDURE — 80053 COMPREHEN METABOLIC PANEL: CPT | Performed by: FAMILY MEDICINE

## 2019-12-10 PROCEDURE — 86038 ANTINUCLEAR ANTIBODIES: CPT | Performed by: FAMILY MEDICINE

## 2019-12-10 PROCEDURE — 85652 RBC SED RATE AUTOMATED: CPT | Performed by: FAMILY MEDICINE

## 2019-12-10 PROCEDURE — 36415 COLL VENOUS BLD VENIPUNCTURE: CPT | Performed by: FAMILY MEDICINE

## 2019-12-10 PROCEDURE — 86431 RHEUMATOID FACTOR QUANT: CPT | Performed by: FAMILY MEDICINE

## 2019-12-10 PROCEDURE — 87491 CHLMYD TRACH DNA AMP PROBE: CPT | Performed by: FAMILY MEDICINE

## 2019-12-10 PROCEDURE — 87210 SMEAR WET MOUNT SALINE/INK: CPT | Performed by: FAMILY MEDICINE

## 2019-12-10 PROCEDURE — 84443 ASSAY THYROID STIM HORMONE: CPT | Performed by: FAMILY MEDICINE

## 2019-12-10 PROCEDURE — 85027 COMPLETE CBC AUTOMATED: CPT | Performed by: FAMILY MEDICINE

## 2019-12-10 PROCEDURE — 82550 ASSAY OF CK (CPK): CPT | Performed by: FAMILY MEDICINE

## 2019-12-10 PROCEDURE — 87591 N.GONORRHOEAE DNA AMP PROB: CPT | Performed by: FAMILY MEDICINE

## 2019-12-10 RX ORDER — METRONIDAZOLE 500 MG/1
500 TABLET ORAL 2 TIMES DAILY
Qty: 14 TABLET | Refills: 0 | Status: SHIPPED | OUTPATIENT
Start: 2019-12-10 | End: 2019-12-17

## 2019-12-10 RX ORDER — FLUCONAZOLE 150 MG/1
150 TABLET ORAL ONCE
Qty: 1 TABLET | Refills: 0 | Status: SHIPPED | OUTPATIENT
Start: 2019-12-10 | End: 2019-12-10

## 2019-12-10 NOTE — PATIENT INSTRUCTIONS
Flagyl 500 mg twice daily for 7 days, no alcohol while on medication   Fluconazole one tablet  Elevate legs above your heart levels while you are at home and reducing salt intake should help with leg swelling  Continue to stay hydrated

## 2019-12-10 NOTE — PROGRESS NOTES
Subjective     Maria Esther Haro is a 44 year old female who presents to clinic today for the following health issues:    HPI   Pap smear     Tx for strep for a few weeks ago.   She is taking GHB and meth. She is mostly taking GHB.   When she is coming off the GHB, she has muscle cramping in her legs. Her hands are dry and scaly. This started around one month ago. She is experiencing at night and when she is coming off GHB, which starts after three hours. Due to the cramping, sometimes it is hard to walk. She takes GHB liquid form, she smokes meth. She takes GHB 4 ml Q3 hours. No history of IV drug use. She is almost ready to quit, but not ready now. She is now doing so much GHB to help with the symptoms.   Around one and half week ago, she noticed swelling of both legs. She has myalgia of lower extremities.   She did a nix treatment because she thought that she had lice, which feels that things got better.   No joint pain, malar rash,   She feels sun burnt in her back area. That has been present for one week.   She has vaginal itching.       Reviewed and updated as needed this visit by Provider         Review of Systems   ROS COMP: Constitutional, HEENT, cardiovascular, pulmonary, gi and gu systems are negative, except as otherwise noted.      Objective    There were no vitals taken for this visit.  There is no height or weight on file to calculate BMI.  Physical Exam   /80   Pulse 103   Temp 98.8  F (37.1  C) (Oral)   Wt 65.8 kg (145 lb)   LMP  (LMP Unknown)   SpO2 98%   BMI 24.89 kg/m    GENERAL: healthy, alert and no distress  EYES: Eyes grossly normal to inspection  HENT: nose and mouth without ulcers or lesions   (female): normal female external genitalia, normal urethral meatus, vaginal mucosa, normal cervix, + discharge   SKIN: no suspicious lesions or rashes  MSK: trace b/l leg edema   PSYCH: mentation appears normal, affect normal    Diagnostic Test Results:  Labs reviewed in Epic         Assessment & Plan     Gamma-hydroxybutyrate (GHB) use disorder, moderate, dependence, Methamphetamine abuse   - Pt not interested in quitting or decreasing use. Continue to monitor.     Myalgia  - Unclear etiology - d/d autoimmune etiology vs vitamin D deficiency vs thyroid etiology vs electrolyte abnormality vs rhabdomyolysis vs RLS vs radiculopathy vs other etiology   - ordered below for further evaluation; tx as indicated  - advised to stay hydrated   - CK total  - Comprehensive metabolic panel (BMP + Alb, Alk Phos, ALT, AST, Total. Bili, TP)  - Aldolase  - Vitamin D Deficiency  - TSH with free T4 reflex  - ESR: Erythrocyte sedimentation rate  - CRP, inflammation  - CBC with platelets  - Rheumatoid factor  - Cyclic Citrullinated Peptide Antibody IgG  - INGE antibody panel  - Anti Nuclear Macarena IgG by IFA with Reflex  - Urine Microscopic    Vaginal itching  - Wet prep  - NEISSERIA GONORRHOEA PCR  - CHLAMYDIA TRACHOMATIS PCR    Personal history of urinary tract infection  - UA reflex to Microscopic and Culture ([place] and [facility] (except [place] and [place])  - Urine Culture Aerobic Bacterial    Encounter for gynecological examination with abnormal finding  - Pap imaged thin layer screen with HPV - recommended age 30 - 65 years (select HPV order below)  - HPV High Risk Types DNA Cervical    Bacterial vaginosis  - advised no alcohol while on medication   - metroNIDAZOLE (FLAGYL) 500 MG tablet; Take 1 tablet (500 mg) by mouth 2 times daily for 7 days  Dispense: 14 tablet; Refill: 0  - Follow if symptoms worsen or fail to improve.    Candidal vulvovaginitis  - fluconazole (DIFLUCAN) 150 MG tablet; Take 1 tablet (150 mg) by mouth once for 1 dose  Dispense: 1 tablet; Refill: 0  - Follow if symptoms worsen or fail to improve.    Skin problem  - normal skin exam on chest and shoulders     Leg edema   - could be due to dependent edema; unlikely DVT vs CHF vs other etiology   - trace b/l leg edema  - advised  low salt diet  - elevating legs above heart level while at home       Return in about 1 week (around 12/17/2019) for sympotms are not improving.    Jose Luis Nava MD  [facility]

## 2019-12-11 ENCOUNTER — MYC MEDICAL ADVICE (OUTPATIENT)
Dept: FAMILY MEDICINE | Facility: CLINIC | Age: 44
End: 2019-12-11

## 2019-12-11 PROBLEM — F13.20: Status: ACTIVE | Noted: 2019-12-11

## 2019-12-11 PROBLEM — F15.10 METHAMPHETAMINE ABUSE (H): Status: ACTIVE | Noted: 2019-12-11

## 2019-12-11 LAB
ALDOLASE SERPL-CCNC: 4.6 U/L (ref 1.5–8.1)
ANA SER QL IF: NEGATIVE
BACTERIA SPEC CULT: NO GROWTH
C TRACH DNA SPEC QL NAA+PROBE: NEGATIVE
CCP AB SER IA-ACNC: 1 U/ML
ENA RNP IGG SER IA-ACNC: <0.2 AI (ref 0–0.9)
ENA SM IGG SER-ACNC: <0.2 AI (ref 0–0.9)
ENA SS-A IGG SER IA-ACNC: <0.2 AI (ref 0–0.9)
ENA SS-B IGG SER IA-ACNC: <0.2 AI (ref 0–0.9)
N GONORRHOEA DNA SPEC QL NAA+PROBE: NEGATIVE
RHEUMATOID FACT SER NEPH-ACNC: <20 IU/ML (ref 0–20)
SPECIMEN SOURCE: NORMAL

## 2019-12-11 NOTE — TELEPHONE ENCOUNTER
Dr. Jose Luis Nava;  Please see patient's MyChart message    Writer notes patient seen in office visit 12/10/2019 and some labs are still in process    Please advise    Thank You!  Oumou Del Real, KITTY  Triage Nurse

## 2019-12-11 NOTE — TELEPHONE ENCOUNTER
Yesterday pt was tx for BV and yeast. I would recommend completing tx and f/u if symptoms continue to persist.   Rest of her labs showed   UC normal, no UTI.   Normal kidney functions.  Not anemic.  Normal inflammatory markers.  Normal thyroid and vitamin D.   Normal muscle enzymes.   Normal autoimmune work up.   For her muscle cramping - she can stay hydrated and take OTC magnesium and iron supplement. Follow up in 2 weeks if symptoms are not improving.

## 2019-12-12 LAB
COPATH REPORT: NORMAL
PAP: NORMAL

## 2019-12-13 ENCOUNTER — MYC MEDICAL ADVICE (OUTPATIENT)
Dept: FAMILY MEDICINE | Facility: CLINIC | Age: 44
End: 2019-12-13

## 2019-12-13 NOTE — TELEPHONE ENCOUNTER
Duplicate -- see additional 12/13/2019 MyChart encounter    Thank You!  Oumou Del Real, RN  Triage Nurse

## 2019-12-13 NOTE — TELEPHONE ENCOUNTER
Adrienne message sent to patient recommending UC for evaluation    Thank You!  Omuou Del Real, RN  Triage Nurse

## 2020-02-13 ENCOUNTER — MYC REFILL (OUTPATIENT)
Dept: PSYCHIATRY | Facility: CLINIC | Age: 45
End: 2020-02-13

## 2020-02-13 DIAGNOSIS — F31.32 BIPOLAR AFFECTIVE DISORDER, CURRENTLY DEPRESSED, MODERATE (H): ICD-10-CM

## 2020-02-14 NOTE — TELEPHONE ENCOUNTER
Topiramate refill request routed to PCP. Last prescriber Tripp Thomas is no longer with [facility]. Per noted dated 9/16/19, medication management was to return to referring provider.    Nisreen Boles RN on 2/14/2020 at 4:13 PM

## 2020-02-26 RX ORDER — TOPIRAMATE 50 MG/1
50 TABLET, FILM COATED ORAL DAILY
Qty: 30 TABLET | Refills: 2 | OUTPATIENT
Start: 2020-02-26

## 2020-02-26 NOTE — TELEPHONE ENCOUNTER
Per pharmacy notes - showing last refill 9/16/19  Disp: 30    Attempted call to patient - no answer - unable to leave voice message - sent elvis Nava - unable to reach patient at this time - do you want us to continue to try to reach patient or advise a new plan as it has been >72 hours

## 2020-02-26 NOTE — TELEPHONE ENCOUNTER
Routing patient's My Chart response to PCP.     Topiramate refill request routed to PCP. Last prescriber Tripp Thomas is no longer with [facility]. Per noted dated 9/16/19, medication management was to returned to referring provider.

## 2020-03-04 DIAGNOSIS — D31.32 CHOROIDAL NEVUS OF LEFT EYE: Primary | ICD-10-CM

## 2020-03-11 ENCOUNTER — OFFICE VISIT (OUTPATIENT)
Dept: FAMILY MEDICINE | Facility: CLINIC | Age: 45
End: 2020-03-11
Payer: COMMERCIAL

## 2020-03-11 ENCOUNTER — TELEPHONE (OUTPATIENT)
Dept: ADDICTION MEDICINE | Facility: CLINIC | Age: 45
End: 2020-03-11

## 2020-03-11 VITALS
DIASTOLIC BLOOD PRESSURE: 80 MMHG | WEIGHT: 147 LBS | SYSTOLIC BLOOD PRESSURE: 128 MMHG | RESPIRATION RATE: 14 BRPM | HEART RATE: 84 BPM | BODY MASS INDEX: 25.1 KG/M2 | TEMPERATURE: 98.4 F | HEIGHT: 64 IN | OXYGEN SATURATION: 99 %

## 2020-03-11 DIAGNOSIS — F31.32 BIPOLAR AFFECTIVE DISORDER, CURRENTLY DEPRESSED, MODERATE (H): ICD-10-CM

## 2020-03-11 DIAGNOSIS — F13.20: Primary | ICD-10-CM

## 2020-03-11 DIAGNOSIS — F15.10 METHAMPHETAMINE ABUSE (H): ICD-10-CM

## 2020-03-11 DIAGNOSIS — R21 RASH AND NONSPECIFIC SKIN ERUPTION: ICD-10-CM

## 2020-03-11 PROCEDURE — 90471 IMMUNIZATION ADMIN: CPT | Performed by: FAMILY MEDICINE

## 2020-03-11 PROCEDURE — 96127 BRIEF EMOTIONAL/BEHAV ASSMT: CPT | Mod: 59 | Performed by: FAMILY MEDICINE

## 2020-03-11 PROCEDURE — 99214 OFFICE O/P EST MOD 30 MIN: CPT | Mod: 25 | Performed by: FAMILY MEDICINE

## 2020-03-11 PROCEDURE — 90686 IIV4 VACC NO PRSV 0.5 ML IM: CPT | Performed by: FAMILY MEDICINE

## 2020-03-11 PROCEDURE — 90472 IMMUNIZATION ADMIN EACH ADD: CPT | Performed by: FAMILY MEDICINE

## 2020-03-11 PROCEDURE — 90732 PPSV23 VACC 2 YRS+ SUBQ/IM: CPT | Performed by: FAMILY MEDICINE

## 2020-03-11 RX ORDER — TOPIRAMATE 50 MG/1
TABLET, FILM COATED ORAL
Qty: 30 TABLET | Refills: 2 | Status: SHIPPED | OUTPATIENT
Start: 2020-03-11 | End: 2021-06-21

## 2020-03-11 ASSESSMENT — ANXIETY QUESTIONNAIRES
3. WORRYING TOO MUCH ABOUT DIFFERENT THINGS: SEVERAL DAYS
7. FEELING AFRAID AS IF SOMETHING AWFUL MIGHT HAPPEN: NOT AT ALL
2. NOT BEING ABLE TO STOP OR CONTROL WORRYING: MORE THAN HALF THE DAYS
5. BEING SO RESTLESS THAT IT IS HARD TO SIT STILL: SEVERAL DAYS
GAD7 TOTAL SCORE: 11
6. BECOMING EASILY ANNOYED OR IRRITABLE: NEARLY EVERY DAY
7. FEELING AFRAID AS IF SOMETHING AWFUL MIGHT HAPPEN: NOT AT ALL
GAD7 TOTAL SCORE: 11
GAD7 TOTAL SCORE: 11
4. TROUBLE RELAXING: SEVERAL DAYS
1. FEELING NERVOUS, ANXIOUS, OR ON EDGE: NEARLY EVERY DAY

## 2020-03-11 ASSESSMENT — MIFFLIN-ST. JEOR: SCORE: 1311.79

## 2020-03-11 ASSESSMENT — PATIENT HEALTH QUESTIONNAIRE - PHQ9
SUM OF ALL RESPONSES TO PHQ QUESTIONS 1-9: 15
10. IF YOU CHECKED OFF ANY PROBLEMS, HOW DIFFICULT HAVE THESE PROBLEMS MADE IT FOR YOU TO DO YOUR WORK, TAKE CARE OF THINGS AT HOME, OR GET ALONG WITH OTHER PEOPLE: VERY DIFFICULT
SUM OF ALL RESPONSES TO PHQ QUESTIONS 1-9: 15

## 2020-03-11 NOTE — PROGRESS NOTES
"   SUBJECTIVE:   CC: Maria Esther Haro is an 44 year old woman who presents for preventive health visit.     HPI  {Add if <65 person on Medicare  - Required Questions (Optional):226024}  {Outside tests to abstract? :917235}    {additional problems to add (Optional):202368}    Today's PHQ-2 Score:   PHQ-2 ( 1999 [employer]) 9/16/2019   Q1: Little interest or pleasure in doing things 1   Q2: Feeling down, depressed or hopeless 1   PHQ-2 Score 2   Q1: Little interest or pleasure in doing things -   Q2: Feeling down, depressed or hopeless -   PHQ-2 Score -       Abuse: Current or Past(Physical, Sexual or Emotional)- { :918914}  Do you feel safe in your environment? { :186728}        Social History     Tobacco Use     Smoking status: Current Every Day Smoker     Packs/day: 0.00     Years: 5.00     Pack years: 0.00     Types: Cigarettes     Smokeless tobacco: Current User     Tobacco comment: I'm using the vape   Substance Use Topics     Alcohol use: No     Comment: none     {Rooming Staff- Complete this question if Prescreen response is not shown below for today's visit. If you drink alcohol do you typically have >3 drinks per day or >7 drinks per week? (Optional):036196}    Alcohol Use 7/15/2019   Prescreen: >3 drinks/day or >7 drinks/week? -   Prescreen: >3 drinks/day or >7 drinks/week? No   {add AUDIT responses (Optional) (A score of 7 for adult men is an indication of hazardous drinking; a score of 8 or more is an indication of an alcohol use disorder.  A score of 7 or more for adult women is an indication of hazardous drinking or an alchohol use disorder):177651}    Reviewed orders with patient.  Reviewed health maintenance and updated orders accordingly - { :332560::\"Yes\"}  {Chronicprobdata (optional):650745}    {Mammo Decision Support (Optional):223805}    Pertinent mammograms are reviewed under the imaging tab.  History of abnormal Pap smear: { :511982}  PAP / HPV Latest Ref Rng & Units 12/10/2019 10/31/2018 2/10/2016 " "  PAP - NIL NIL NIL   HPV 16 DNA NEG:Negative Negative Negative Negative   HPV 18 DNA NEG:Negative Negative Negative Negative   OTHER HR HPV NEG:Negative Negative Negative Negative     Reviewed and updated as needed this visit by clinical staff  Allergies  Meds         Reviewed and updated as needed this visit by Provider        {HISTORY OPTIONS (Optional):227121}    Review of Systems  {FEMALE ROS (Optional):599275}     OBJECTIVE:   /80 (BP Location: Right arm, Patient Position: Sitting, Cuff Size: Adult Regular)   Pulse 84   Temp 98.4  F (36.9  C) (Oral)   Resp 14   Ht 1.626 m (5' 4\")   Wt 66.7 kg (147 lb)   LMP 03/08/2020   SpO2 99%   BMI 25.23 kg/m    Physical Exam  {Exam Choices (Optional):822844}    {Diagnostic Test Results (Optional):487110::\"Diagnostic Test Results:\",\"Labs reviewed in Epic\"}    ASSESSMENT/PLAN:   {Diag Picklist:704496}    COUNSELING:  {FEMALE COUNSELING MESSAGES:668483::\"Reviewed preventive health counseling, as reflected in patient instructions\"}    Estimated body mass index is 25.23 kg/m  as calculated from the following:    Height as of this encounter: 1.626 m (5' 4\").    Weight as of this encounter: 66.7 kg (147 lb).    {Weight Management Plan (ACO) Complete if BMI is abnormal-  Ages 18-64  BMI >24.9.  Age 65+ with BMI <23 or >30 (Optional):049213}     reports that she has been smoking cigarettes. She has been smoking about 0.00 packs per day for the past 5.00 years. She uses smokeless tobacco.  {Tobacco Cessation -- Complete if patient is a smoker (Optional):531445}    Counseling Resources:  ATP IV Guidelines  Pooled Cohorts Equation Calculator  Breast Cancer Risk Calculator  FRAX Risk Assessment  ICSI Preventive Guidelines  Dietary Guidelines for Americans, 2010  USDA's MyPlate  ASA Prophylaxis  Lung CA Screening    Jose Luis Nava MD  [facility]  Answers for HPI/ROS submitted by the patient on 3/11/2020   If you checked off any problems, how " difficult have these problems made it for you to do your work, take care of things at home, or get along with other people?: Very difficult  PHQ9 TOTAL SCORE: 15  STEVEN 7 TOTAL SCORE: 11

## 2020-03-11 NOTE — TELEPHONE ENCOUNTER
Please schedule appointment for patient with   Addiction Medicine Provider   For GHB/Meth use

## 2020-03-11 NOTE — PROGRESS NOTES
"   SUBJECTIVE:   CC: Maria Esther Haro is an 44 year old woman who presents for preventive health visit.     Healthy Habits:    Do you get at least three servings of calcium containing foods daily (dairy, green leafy vegetables, etc.)? { :369246::\"yes\"}    Amount of exercise or daily activities, outside of work: { :271689}    Problems taking medications regularly { :601796::\"No\"}    Medication side effects: { :000755::\"No\"}    Have you had an eye exam in the past two years? { :534850}    Do you see a dentist twice per year? { :660105}    Do you have sleep apnea, excessive snoring or daytime drowsiness?{ :281403}  {Outside tests to abstract? :920334}    {additional problems to add (Optional):876312}    Today's PHQ-2 Score:   PHQ-2 ( 1999 [employer]) 9/16/2019 7/10/2019   Q1: Little interest or pleasure in doing things 1 1   Q2: Feeling down, depressed or hopeless 1 1   PHQ-2 Score 2 2   Q1: Little interest or pleasure in doing things - Several days   Q2: Feeling down, depressed or hopeless - Several days   PHQ-2 Score - 2   Answers for HPI/ROS submitted by the patient on 3/11/2020   If you checked off any problems, how difficult have these problems made it for you to do your work, take care of things at home, or get along with other people?: Very difficult  PHQ9 TOTAL SCORE: 15  STEVEN 7 TOTAL SCORE: 11    Abuse: Current or Past(Physical, Sexual or Emotional)- {YES/NO/NA:655080}  Do you feel safe in your environment? {YES/NO/NA:174885}        Social History     Tobacco Use     Smoking status: Current Every Day Smoker     Packs/day: 0.00     Years: 5.00     Pack years: 0.00     Types: Cigarettes     Smokeless tobacco: Current User     Tobacco comment: I'm using the vape   Substance Use Topics     Alcohol use: No     Comment: none     If you drink alcohol do you typically have >3 drinks per day or >7 drinks per week? {ETOH :234701}                     Reviewed orders with patient.  Reviewed health maintenance and updated " "orders accordingly - {Yes/No:927924::\"Yes\"}  {Chronicprobdata (Optional):788900}    {Mammo Decision Support (Optional):623314}    Pertinent mammograms are reviewed under the imaging tab.  History of abnormal Pap smear: {PAP HX:861817}  PAP / HPV Latest Ref Rng & Units 12/10/2019 10/31/2018 2/10/2016   PAP - NIL NIL NIL   HPV 16 DNA NEG:Negative Negative Negative Negative   HPV 18 DNA NEG:Negative Negative Negative Negative   OTHER HR HPV NEG:Negative Negative Negative Negative     Reviewed and updated as needed this visit by clinical staff         Reviewed and updated as needed this visit by Provider        {HISTORY OPTIONS (Optional):206361}    ROS:  { :097195}    OBJECTIVE:   There were no vitals taken for this visit.  EXAM:  {Exam Choices:460036}    {Diagnostic Test Results (Optional):542171::\"Diagnostic Test Results:\",\"Labs reviewed in Epic\"}    ASSESSMENT/PLAN:   {Diag Picklist:852225}    COUNSELING:   {FEMALE COUNSELING MESSAGES:703072::\"Reviewed preventive health counseling, as reflected in patient instructions\"}    Estimated body mass index is 24.89 kg/m  as calculated from the following:    Height as of 7/15/19: 1.626 m (5' 4\").    Weight as of 12/10/19: 65.8 kg (145 lb).    {Weight Management Plan (ACO) Complete if BMI is abnormal-  Ages 18-64  BMI >24.9.  Age 65+ with BMI <23 or >30 (Optional):492871}     reports that she has been smoking cigarettes. She has been smoking about 0.00 packs per day for the past 5.00 years. She uses smokeless tobacco.  {Tobacco Cessation -- Complete if patient is a smoker (Optional):575720}    Counseling Resources:  ATP IV Guidelines  Pooled Cohorts Equation Calculator  Breast Cancer Risk Calculator  FRAX Risk Assessment  ICSI Preventive Guidelines  Dietary Guidelines for Americans, 2010  USDA's MyPlate  ASA Prophylaxis  Lung CA Screening    Rosioa Jake Nava MD  [facility]      "

## 2020-03-11 NOTE — PATIENT INSTRUCTIONS
- topiramate (TOPAMAX) 50 MG tablet; Take 1 tablet (50 mg) by mouth daily for 7 days, THEN 2 tablets (100 mg) daily.

## 2020-03-11 NOTE — PROGRESS NOTES
"   SUBJECTIVE:     Wants to restart topamax. She was on Topamax to 100 mg Qday - which made symptoms more controlled. She hasn't been on medication for two months. She is experiencing irritability and manic episodes. She is unsure of her last manic episodes due to drugs. She is currently doing meth and GHB. She was going [place] in [place] [facility] this [place]. No alcohol use. No SI or HI.     She has a rash in the back of her arms which gets worse at     Reviewed PMH, PSH, FH, Medication and Allergies.     ROS:  10 point ROS negative except for above     OBJECTIVE:   /80 (BP Location: Right arm, Patient Position: Sitting, Cuff Size: Adult Regular)   Pulse 84   Temp 98.4  F (36.9  C) (Oral)   Resp 14   Ht 1.626 m (5' 4\")   Wt 66.7 kg (147 lb)   LMP 03/08/2020   SpO2 99%   BMI 25.23 kg/m    EXAM:  GENERAL: healthy, alert and no distress  EYES: Eyes grossly normal to inspectionl  HENT:nose and mouth without ulcers or lesions  MS: no gross musculoskeletal defects noted  SKIN: no suspicious lesions or rashes  PSYCH: mentation appears normal, affect normal    Diagnostic Test Results:  none     ASSESSMENT/PLAN:   Gamma-hydroxybutyrate (GHB) use disorder, moderate, dependence (H)  - MENTAL HEALTH REFERRAL  - Adult; Addiction Medicine Provider; Addiction Medicine Evaluation & Treatment; Addiction Medicine Consultation, Evaluation & Treatment [phone]; Meth/Amphetamines; Other: Enter in Comments  - MENTAL HEALTH REFERRAL  - Adult; Psychiatry; Psychiatry; Other: [facility] [phone]; We will contact you to schedule the appointment or please call with any questions    Bipolar affective disorder, currently depressed, moderate (H)  - Pt interested in restarting medication. Restarted Topamax and referred to psychiatry for further management.   - topiramate (TOPAMAX) 50 MG tablet; Take 1 tablet (50 mg) by mouth daily for 7 days, THEN 2 tablets (100 mg) daily.  Dispense: 30 tablet; Refill: 2  - MENTAL HEALTH " REFERRAL  - Adult; Psychiatry; Psychiatry; Other: [facility] [phone]; We will contact you to schedule the appointment or please call with any questions    Methamphetamine abuse (H)  - referred to addiction medicine     Rash and nonspecific skin eruption  - normal skin exam      Jose Luis Nava MD  [facility]  Answers for HPI/ROS submitted by the patient on 3/11/2020   If you checked off any problems, how difficult have these problems made it for you to do your work, take care of things at home, or get along with other people?: Very difficult  PHQ9 TOTAL SCORE: 15  STEVEN 7 TOTAL SCORE: 11

## 2020-03-11 NOTE — TELEPHONE ENCOUNTER
Please review referral. Please route back to reception pool #[phone].     Thank you,    Radha Riley    [facility]

## 2020-03-11 NOTE — NURSING NOTE
Prior to immunization administration, verified patients identity using patient s name and date of birth. Please see Immunization Activity for additional information.     Screening Questionnaire for Adult Immunization    Are you sick today?   No   Do you have allergies to medications, food, a vaccine component or latex?   No   Have you ever had a serious reaction after receiving a vaccination?   No   Do you have a long-term health problem with heart, lung, kidney, or metabolic disease (e.g., diabetes), asthma, a blood disorder, no spleen, complement component deficiency, a cochlear implant, or a spinal fluid leak?  Are you on long-term aspirin therapy?   No   Do you have cancer, leukemia, HIV/AIDS, or any other immune system problem?   No   Do you have a parent, brother, or sister with an immune system problem?   No   In the past 3 months, have you taken medications that affect  your immune system, such as prednisone, other steroids, or anticancer drugs; drugs for the treatment of rheumatoid arthritis, Crohn s disease, or psoriasis; or have you had radiation treatments?   No   Have you had a seizure, or a brain or other nervous system problem?   No   During the past year, have you received a transfusion of blood or blood    products, or been given immune (gamma) globulin or antiviral drug?   No   For women: Are you pregnant or is there a chance you could become       pregnant during the next month?   No   Have you received any vaccinations in the past 4 weeks?   No     Immunization questionnaire answers were all negative.        Per orders of Dr. Nava, injection of ppsv 23 and flu given by Judith Sue. Patient instructed to remain in clinic for 15 minutes afterwards, and to report any adverse reaction to me immediately.       Screening performed by Judith Sue on 3/11/2020 at 12:01 PM.

## 2020-03-12 ASSESSMENT — ANXIETY QUESTIONNAIRES: GAD7 TOTAL SCORE: 11

## 2020-03-12 NOTE — TELEPHONE ENCOUNTER
Pt is scheduled with Carrie Cruz on 03/20 @ 11am @ [facility] . Closing encounter as no further follow up is needed.     Radha Riley    [facility]

## 2020-03-20 ENCOUNTER — TELEPHONE (OUTPATIENT)
Dept: ADDICTION MEDICINE | Facility: CLINIC | Age: 45
End: 2020-03-20

## 2020-04-01 ENCOUNTER — VIRTUAL VISIT (OUTPATIENT)
Dept: FAMILY MEDICINE | Facility: CLINIC | Age: 45
End: 2020-04-01
Payer: COMMERCIAL

## 2020-04-01 DIAGNOSIS — Z11.3 SCREEN FOR STD (SEXUALLY TRANSMITTED DISEASE): Primary | ICD-10-CM

## 2020-04-01 DIAGNOSIS — F15.20 METHAMPHETAMINE DEPENDENCE (H): ICD-10-CM

## 2020-04-01 DIAGNOSIS — R35.0 URINARY FREQUENCY: ICD-10-CM

## 2020-04-01 DIAGNOSIS — N89.8 VAGINAL DISCHARGE: ICD-10-CM

## 2020-04-01 DIAGNOSIS — L98.9 SKIN PROBLEM: ICD-10-CM

## 2020-04-01 DIAGNOSIS — K59.00 CONSTIPATION, UNSPECIFIED CONSTIPATION TYPE: ICD-10-CM

## 2020-04-01 PROCEDURE — 99213 OFFICE O/P EST LOW 20 MIN: CPT | Mod: TEL | Performed by: FAMILY MEDICINE

## 2020-04-01 NOTE — PROGRESS NOTES
"Subjective     Maria Esther Haro is a 45 year old female who is being evaluated via a billable telephone visit.      The patient has been notified of following:     \"This telephone visit will be conducted via a call between you and your physician/provider. We have found that certain health care needs can be provided without the need for a physical exam.  This service lets us provide the care you need with a short phone conversation.  If a prescription is necessary we can send it directly to your pharmacy.  If lab work is needed we can place an order for that and you can then stop by our lab to have the test done at a later time.    If during the course of the call the physician/provider feels a telephone visit is not appropriate, you will not be charged for this service.\"     Patient has given verbal consent for Telephone visit?  Yes    Maria Esther Haro complains of No chief complaint on file.      ALLERGIES  Acetaminophen; No clinical screening - see comments; Oxycodone; Oxycontin [oxycodone hcl]; Percocet [oxycodone-acetaminophen]; and Vicodin [acetaminophen]    GERD/Heartburn  Onset: x 2 months     Description:     Burning in chest: no     Intensity: mild    Progression of Symptoms: worsening    Accompanying Signs & Symptoms:  Does it feel like food gets stuck: YES  Nausea: no   Vomiting (bloody?): no   Abdominal Pain: YES  Black-Tarry stools: YES:  Bloody stools: YES    History:   Previous ulcers: no     Precipitating factors:   Caffeine use: YES  Alcohol use: no   NSAID/Aspirin use: no   Tobacco use: YES  Worse with no particular food or drink.    Alleviating factors:  sugar     Therapies Tried and outcome:none      No IV drug use.   Today she noted blood in her stool. Her stools were hard and she had some mucous. This was her first BM in a few days. No rectal pain. She doesn't have hemorrhoids.   Her stomach is bloated. She is drinking more fluids. In the morning she has yellow spit.   She has been off " methametamine since Saturday. She has been off GHB for two days.   She feels static pulling when her clothes are on. She feels that something is on her skin moving around. Not bugs. Nothing is seen. Occasionally rashes. No current rashes.   She has constant chills. She hasn't had headache for three days.   She has intermittent nausea, no current nausea.   No lower back pain, fevers, headache, rhinorrhea, coughing, heartburn, nausea or diarrhea.   She can go through a 12 ounce soda can around 12 of those/day. She is drinking at least 3 at night. She has increased thirst.   She is having more urinary frequency and urgency. If she can't make it to the bathroom then she will have urinary incontinence.   She can't sleep at night.   She has been experiencing this for a couple of weeks.   She has some vaginal discharge. No concern for STDs.   She is not eating that much. No appetite.     Reviewed and updated as needed this visit by Provider       Assessment/Plan:    1. Screen for STD (sexually transmitted disease)  - Treponema Abs w Reflex to RPR and Titer; Future  - NEISSERIA GONORRHOEA PCR; Future  - CHLAMYDIA TRACHOMATIS PCR; Future  - Hepatitis B core antibody; Future  - Hepatitis B Surface Antibody; Future  - Hepatitis B surface antigen; Future  - HIV Antigen Antibody Combo; Future  - Herpes Simplex Virus 1 and 2 IgG; Future  - Hepatitis C Screen Reflex to HCV RNA Quant and Genotype; Future    2. Urinary frequency  - recommended decreasing pop drinks as caffeinated beverages could be contributing to urinary frequency  - also obtained UA/UC to r/u UTI and hgba1c to r/u diabetes mellitus   - *UA reflex to Microscopic and Culture ([facility] (except [place] and [place]); Future  - Urine Culture Aerobic Bacterial; Future  - Hemoglobin A1c; Future    3. Vaginal discharge  - Wet prep; Future    4. Methamphetamine dependence (H)  - CBC with platelets; Future  - Comprehensive metabolic panel (BMP + Alb,  Alk Phos, ALT, AST, Total. Bili, TP); Future  - TSH with free T4 reflex; Future    5. Constipation -   - Advised eating three meals/day which are balanced. Advised increased vegetable, fruit and water intake.    6.  Skin problem   - unclear if it is related to drug use  - no current rashes or wounds  - continue to monitor       Phone visit scheduled for 04/08 at 1 pm  Lab only tomorrow at 12:15 pm    Phone call duration:  14 minutes    Jose Luis Nava MD

## 2020-07-18 ENCOUNTER — HOSPITAL ENCOUNTER (EMERGENCY)
Facility: CLINIC | Age: 45
Discharge: HOME | End: 2020-07-18
Attending: EMERGENCY MEDICINE | Admitting: EMERGENCY MEDICINE
Payer: COMMERCIAL

## 2020-07-18 VITALS
HEART RATE: 101 BPM | SYSTOLIC BLOOD PRESSURE: 162 MMHG | OXYGEN SATURATION: 99 % | DIASTOLIC BLOOD PRESSURE: 89 MMHG | RESPIRATION RATE: 18 BRPM | TEMPERATURE: 97.9 F

## 2020-07-18 DIAGNOSIS — K04.7 DENTAL ABSCESS: ICD-10-CM

## 2020-07-18 DIAGNOSIS — K13.79 ORAL PAIN: ICD-10-CM

## 2020-07-18 PROCEDURE — 99283 EMERGENCY DEPT VISIT LOW MDM: CPT | Mod: 25

## 2020-07-18 PROCEDURE — 25000132 ZZH RX MED GY IP 250 OP 250 PS 637: Performed by: EMERGENCY MEDICINE

## 2020-07-18 PROCEDURE — 64400 NJX AA&/STRD TRIGEMINAL NRV: CPT

## 2020-07-18 RX ORDER — LIDOCAINE HYDROCHLORIDE AND EPINEPHRINE BITARTRATE 20; .01 MG/ML; MG/ML
INJECTION, SOLUTION SUBCUTANEOUS
Status: DISCONTINUED
Start: 2020-07-18 | End: 2020-07-18 | Stop reason: HOSPADM

## 2020-07-18 RX ADMIN — ACETAMINOPHEN AND CODEINE PHOSPHATE 2 TABLET: 300; 30 TABLET ORAL at 04:01

## 2020-07-18 ASSESSMENT — ENCOUNTER SYMPTOMS
CHILLS: 0
FEVER: 0

## 2020-07-18 NOTE — ED PROVIDER NOTES
"  History     Chief Complaint:  Dental Pain    HPI   Maria Esther Haro is a 45 year old female with a history of HSV-2 infection, migraine, and polysubstance abuse who presents for evaluation of right-sided dental pain. The patient states she has had right-sided dental pain \"for a long time\". Two days ago she was seen by her dentist and she was prescribed amoxicillin. Since her dentist appointment, she has had increased pain causing her to have difficulty sleeping, prompting her presentation.     Here, the patient states she had began noticing swelling on the roof of her mouth today as well and she is concerned for an abscess. She states she is having her teeth pulled in 2 weeks. She denies any fever, chills, or other symptoms prompting her presentation.     Allergies:  Acetaminophen  Oxycodone  Oxycontin [Oxycodone Hcl]  Percocet [Oxycodone-Acetaminophen]  Vicodin [Acetaminophen]      Medications:    Topamax      Past Medical History:    Alcohol abuse  Depression  Migraine  Polysubstance abuse  Restless leg syndrome   HSV-2 infection  Bipolar affect disorder  Gamma-hydroxybutyrate abuse     Past Surgical History:     section x 3  Breast augmentation    Family History:    Unknown/adopted     Social History:  The patient was unaccompanied to the ED.  Smoking Status: Current  Smokeless tobacco: Current - vape  Alcohol Use: Not currently  Drug Use: Yes - Methamphetamine, GHB  PCP:  Jose Luis Nava   Marital Status:  Single [1]     Review of Systems   Constitutional: Negative for chills and fever.   HENT: Positive for dental problem.      Physical Exam     Patient Vitals for the past 24 hrs:   BP Temp Temp src Pulse Heart Rate Resp SpO2   20 0156 (!) 162/89 97.9  F (36.6  C) Temporal 101 101 18 99 %     Physical Exam  ENT:                Multiple diseased teeth.  Fluctuant swelling on roof of mouth.  No trismus.  Resp:               Non-labored  Neuro:             Alert and cooperative  MSkel:             " Moving all extremities  Skin:                No rash    Emergency Department Course     Procedures:    Incision and Drainage     LOCATIONS:  Roof of mouth     ANESTHESIA:  Palantine block using Lidocaine 2% with epinephrine, total of 1.8 mLs     PREPARATION:  Cleansed with Normal Saline and Shur Clens     PROCEDURE:  Area was incised with # 11 Blade ([place]) with a Single Straight incision.  Wound treatment included Expression of Purulent Material.  Packing consisted of No Packing.      PATIENT STATUS:        Patient tolerated the procedure well. There were no complications.      Interventions:  0401 Tylenol #3 2 tablets PO    Emergency Department Course:  Past medical records, nursing notes, and vitals reviewed.    (0348)   I performed an exam of the patient as documented above. History obtained from patient.     (0425)   I performed the incision and drainage procedure, as noted above.     (2314)   I rechecked the patient and discussed results and plan of care.     Findings and plan explained to the Patient. Patient discharged home with instructions regarding supportive care, medications, and reasons to return. The importance of close follow-up was reviewed. The patient was prescribed Tylenol #3. I personally answered all related questions prior to discharge.     Impression & Plan     Medical Decision Making:  Exam c/w dental abscess w/o deep space infection.  I&D performed as above.  Already on appropriate antibiotics.  Needs close dentistry follow-up.  Pain medication prescribed.  OTC analgesic dosing provided.  Reasons for immediate return discussed.    Diagnosis:    ICD-10-CM    1. Dental abscess  K04.7    2. Oral pain  K13.79      Disposition:  Discharged to home.    Discharge Medications:     Medication List      Started    acetaminophen-codeine 300-30 MG tablet  Commonly known as:  TYLENOL #3  1-2 tablets, Oral, EVERY 6 HOURS PRN           Scribe Disclosure:  Prem BARBER, am serving as a scribe at  3:48 AM on 7/18/2020 to document services personally performed by Lani Holloway MD based on my observations and the provider's statements to me.   7/18/2020   [facility] EMERGENCY DEPARTMENT       Lani Holloway MD  07/18/20 0457

## 2020-07-18 NOTE — ED TRIAGE NOTES
"Rt side dental pain for \"a long time\".  Seen at dentist today and started on amoxicillin.  Pt report pain is unbearable.  Pt states she has swelling to the roof of her mouth and an abcess  " Letter mailed to patient with lab results and recommendations from provider.

## 2020-07-18 NOTE — ED AVS SNAPSHOT
[facility]  [address]  [facility] [address]  Phone:  [phone]  Fax:  [phone]                                    Maria Esther Haro   MRN: [id number]    Department:  [facility] Emergency Department   Date of Visit:  7/18/2020           After Visit Summary Signature Page    I have received my discharge instructions, and my questions have been answered. I have discussed any challenges I see with this plan with the nurse or doctor.    ..........................................................................................................................................  Patient/Patient Representative Signature      ..........................................................................................................................................  Patient Representative Print Name and Relationship to Patient    ..................................................               ................................................  Date                                   Time    ..........................................................................................................................................  Reviewed by Signature/Title    ...................................................              ..............................................  Date                                               Time          22EPIC Rev 08/18

## 2020-07-20 ENCOUNTER — PATIENT OUTREACH (OUTPATIENT)
Dept: CARE COORDINATION | Facility: CLINIC | Age: 45
End: 2020-07-20

## 2020-07-20 NOTE — PROGRESS NOTES
Clinic Care Coordination Contact  [facility]/Voicemail    Clinical Data: Care Coordinator Outreach  Outreach attempted x 1.  Left message on patient's voicemail with call back information and requested return call.  Plan: Care Coordinator will try to reach patient again in 1-2 business days.

## 2020-07-21 NOTE — PROGRESS NOTES
Community Health Worker called and left a message for the patient.  If the patient is returning my call, please transfer the patient to [facility] at ext. [phone].   Patient has been mailed a unreachable letter and was provided with CHW contact information if they are interested in accessing Clinic Care Coordination.  Order for Care Management has been closed, no further outreach will be done at this time and patient can be re-referred.

## 2020-08-12 NOTE — RESULT ENCOUNTER NOTE
"Please call patient with the following message:  Urine culture with some resistance, but not to specific antibiotic given - please check on urinary symptoms especially though - may still need different oral antibiotic for this.  Thanks  Hyun \"Cam\" LETY Beltran"
Results discussed with patient at time of visit.  
no

## 2020-09-04 ENCOUNTER — TELEPHONE (OUTPATIENT)
Dept: OPHTHALMOLOGY | Facility: CLINIC | Age: 45
End: 2020-09-04

## 2020-09-04 NOTE — TELEPHONE ENCOUNTER
Flat nevus per last note in January 2019with recommendation to follow-up I 6 months    Scheduled next week with Dr. Segundo    Pt aware of date/time/location at [facility]    Ed Sullivan RN 9:29 AM 09/04/20            [facility]    Phone Message    May a detailed message be left on voicemail: yes     Reason for Call: Other: Pt would like to schedule a follow up appt. for Choroidal nevus of left eye.  Guidelines state to send encounter.  Please follow up with Pt for scheduling.      Action Taken: Other: EYE    Travel Screening: Not Applicable

## 2020-09-09 DIAGNOSIS — D31.32 CHOROIDAL NEVUS OF LEFT EYE: Primary | ICD-10-CM

## 2020-09-21 ENCOUNTER — MYC MEDICAL ADVICE (OUTPATIENT)
Dept: DERMATOLOGY | Facility: CLINIC | Age: 45
End: 2020-09-21

## 2020-11-03 ENCOUNTER — MYC REFILL (OUTPATIENT)
Dept: FAMILY MEDICINE | Facility: CLINIC | Age: 45
End: 2020-11-03

## 2020-11-03 DIAGNOSIS — F31.32 BIPOLAR AFFECTIVE DISORDER, CURRENTLY DEPRESSED, MODERATE (H): ICD-10-CM

## 2020-11-03 RX ORDER — TOPIRAMATE 50 MG/1
TABLET, FILM COATED ORAL
Qty: 30 TABLET | Refills: 2 | Status: CANCELLED | OUTPATIENT
Start: 2020-11-03 | End: 2020-12-10

## 2020-11-06 RX ORDER — TOPIRAMATE 50 MG/1
TABLET, FILM COATED ORAL
Qty: 30 TABLET | Refills: 2 | OUTPATIENT
Start: 2020-11-06 | End: 2020-12-13

## 2020-11-06 NOTE — TELEPHONE ENCOUNTER
Psychiatrist referral signed.   RN, can you please verify dose and I'll do 2 months refill until she gets established.  Thank you!

## 2020-11-06 NOTE — TELEPHONE ENCOUNTER
I talked to pt.  She says her psychiatrist, Ming, left the [facility].    She is needing a new psychiatrist and med refill.  Pended MH referral.    KITTY Grajeda

## 2020-11-09 RX ORDER — TOPIRAMATE 50 MG/1
TABLET, FILM COATED ORAL
Qty: 56 TABLET | Refills: 1 | OUTPATIENT
Start: 2020-11-09 | End: 2020-12-16

## 2020-11-09 NOTE — TELEPHONE ENCOUNTER
Gave pt this message.  Gave her the new psychiatry referral info.  For the Topamax, pt will need to start at the 50 mg daily for 7 days before going up to the 100 mg daily. Confirmed this with pt.  Med is pended.  KITTY Grajeda

## 2020-11-09 NOTE — TELEPHONE ENCOUNTER
I left this provider message for pt on her VM.  Since pt has been off the med, it should come from psychiatry.  For further questions, please call clinic triage.  KITTY Grajeda

## 2020-11-09 NOTE — TELEPHONE ENCOUNTER
I assumed pt was already on the medication and needed a refill. If she hasn't been on the medication, then I would defer to psychiatrist about medication management.   Thanks!  NASIR

## 2020-11-16 ENCOUNTER — MYC MEDICAL ADVICE (OUTPATIENT)
Dept: FAMILY MEDICINE | Facility: CLINIC | Age: 45
End: 2020-11-16

## 2020-11-16 ENCOUNTER — OFFICE VISIT (OUTPATIENT)
Dept: DERMATOLOGY | Facility: CLINIC | Age: 45
End: 2020-11-16
Payer: COMMERCIAL

## 2020-11-16 VITALS — SYSTOLIC BLOOD PRESSURE: 128 MMHG | DIASTOLIC BLOOD PRESSURE: 82 MMHG

## 2020-11-16 DIAGNOSIS — L81.4 LENTIGINES: ICD-10-CM

## 2020-11-16 DIAGNOSIS — D22.9 MULTIPLE BENIGN NEVI: Primary | ICD-10-CM

## 2020-11-16 PROCEDURE — 99243 OFF/OP CNSLTJ NEW/EST LOW 30: CPT | Performed by: PHYSICIAN ASSISTANT

## 2020-11-16 NOTE — PROGRESS NOTES
HPI:  I was asked to see pt by Dr. Quinteros. Maria Esther Haro is a 45 year old female patient here today for brown patch on right temple .  Patient states this has been present for years.  Patient reports the following symptoms: slowly grown over the years .  Patient reports the following previous treatments: none.  Patient reports the following modifying factors: none.  Associated symptoms: none.  Patient has no other skin complaints today.  Remainder of the HPI, Meds, PMH, Allergies, FH, and SH was reviewed in chart.    Pertinent Hx:   Paternal grandmother had a skin cancer. No personal history of skin cancer.   Past Medical History:   Diagnosis Date     Alcohol abuse, in remission     Remission since      ASCUS on Pap smear 11    HPV-pos. 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, in remission (H)     Since May 2001     Other, mixed, or unspecified nondependent drug abuse, in remission     In remission since  (cocaine and ectasy)     Restless leg syndrome        Past Surgical History:   Procedure Laterality Date     ABDOMEN SURGERY  2010    Csection     C ANESTH,SURG BREAST RECONSTRUCTIVE      Breast augmentation     COSMETIC SURGERY  2007    Breast surgery     ZZC NONSPECIFIC PROCEDURE   &      x 3, previous tear in cervix        Family History   Adopted: Yes   Problem Relation Age of Onset     Skin Cancer Paternal Grandmother      Unknown/Adopted No family hx of         Pt is adopted     Macular Degeneration No family hx of      Glaucoma No family hx of        Social History     Socioeconomic History     Marital status: Single     Spouse name: Not on file     Number of children: 3     Years of education: Not on file     Highest education level: Not on file   Occupational History     Employer: NONE     Social Needs     Financial resource strain: Not on file     Food insecurity     Worry: Not on file     Inability: Not on file     Transportation needs     Medical: Not on file     Non-medical: Not on file   Tobacco Use     Smoking status: Current Every Day Smoker     Packs/day: 0.00     Years: 5.00     Pack years: 0.00     Types: Cigarettes     Smokeless tobacco: Current User     Tobacco comment: I'm using the vape   Substance and Sexual Activity     Alcohol use: No     Comment: none     Drug use: Yes     Types: Methamphetamines, Other, Amphetamines, GHB     Comment: GHB     Sexual activity: Yes     Partners: Female, Male     Birth control/protection: Condom, Female Surgical     Comment: Tubal Ligation   Lifestyle     Physical activity     Days per week: Not on file     Minutes per session: Not on file     Stress: Not on file   Relationships     Social connections     Talks on phone: Not on file     Gets together: Not on file     Attends Hindu service: Not on file     Active member of club or organization: Not on file     Attends meetings of clubs or organizations: Not on file     Relationship status: Not on file     Intimate partner violence     Fear of current or ex partner: Not on file     Emotionally abused: Not on file     Physically abused: Not on file     Forced sexual activity: Not on file   Other Topics Concern     Parent/sibling w/ CABG, MI or angioplasty before 65F 55M? No   Social History Narrative     Not on file       Outpatient Encounter Medications as of 11/16/2020   Medication Sig Dispense Refill     topiramate (TOPAMAX) 50 MG tablet Take 1 tablet (50 mg) by mouth daily for 7 days, THEN 2 tablets (100 mg) daily. 30 tablet 2     No facility-administered encounter medications on file as of 11/16/2020.        Review Of Systems:  Skin: spots  Eyes: negative  Ears/Nose/Throat: negative  Respiratory: No shortness of breath, dyspnea on exertion, cough, or hemoptysis  Cardiovascular:  negative  Gastrointestinal: negative  Genitourinary: negative  Musculoskeletal: negative  Neurologic: negative  Psychiatric: negative  Hematologic/Lymphatic/Immunologic: negative  Endocrine: negative      Objective:     /82   Eyes: Conjunctivae/lids: Normal   ENT: Lips:  Normal  MSK: Normal  Cardiovascular: Peripheral edema none  Pulm: Breathing Normal  Neuro/Psych: Orientation: A/O x 3. Normal; Mood/Affect: Normal, NAD, WDWN  Pt accompanied by: self  Following areas examined: face ( wearing mask), neck, back  Valdes skin type:ii   Findings:  Well circumscribed lobular papule with symmetric color distribution on back  Well circumscribed macules with symmetric color distribution on back and face  Chavez WD smooth patch on right temple      Assessment and Plan:     1)Benign nevi, Lentigines     I discussed the specifics of tumor, prognosis, and genetics of benign lesions.  I explained that treatment of these lesions would be purely cosmetic and not medically neccessary.  I discussed with patient different removal options including lightening agens cryotherapy, cautery and /or laser.  Lesion may recur and/or may not completely resolve. May need additional treatment. Recommend daily sunscreen  Signs and Symptoms of non-melanoma skin cancer and ABCDEs of melanoma reviewed with patient. Patient encouraged to perform monthly self skin exams and educated on how to perform them. UV precautions reviewed with patient. Patient was asked about new or changing moles/lesions on body.   Wear a sunscreen with at least SPF 30 on your face, ears, neck and V of the chest daily. Wear sunscreen on other areas of the body if those areas are exposed to the sun throughout the day. Sunscreens can contain physical and/or chemical blockers. Physical blockers are less likely to clog pores, these include zinc oxide and titanium dioxide. Reapply every two hour and after swimming. Sunscreen examples include Neutrogena, CeraVe, Blue Lizard,  Elta MD and many others.    Proper skin care from [facility]:    -Eliminate harsh soaps as they strip the natural oils from the skin, often resulting in dry itchy skin ( i.e. Dial, Zest, [place])  -Use mild soaps such as Cetaphil or Dove Sensitive Skin in the shower. You do not need to use soap on arms, legs, and trunk every time you shower unless visibly soiled.   -Avoid hot or cold showers.  -After showering, lightly dry off and apply moisturizing within 2-3 minutes. This will help trap moisture in the skin.   -Aggressive use of a moisturizer at least 1-2 times a day to the entire body (including -Vanicream, Cetaphil, Aquaphor or Cerave) and moisturize hands after every washing.  -We recommend using moisturizers that come in a tub that needs to be scooped out, not a pump. This has more of an oil base. It will hold moisture in your skin much better than a water base moisturizer. The above recommended are non-pore clogging.               Follow up in yearly FBE

## 2020-11-16 NOTE — LETTER
2020         RE: Maria Esther Haro  [address] [facility] [address]        Dear Colleague,    Thank you for referring your patient, Maria Esther Haro, to the [facility]. Please see a copy of my visit note below.    HPI:  I was asked to see pt by Dr. Quinetros. Maria Esther Haro is a 45 year old female patient here today for brown patch on right temple .  Patient states this has been present for years.  Patient reports the following symptoms: slowly grown over the years .  Patient reports the following previous treatments: none.  Patient reports the following modifying factors: none.  Associated symptoms: none.  Patient has no other skin complaints today.  Remainder of the HPI, Meds, PMH, Allergies, FH, and SH was reviewed in chart.    Pertinent Hx:   Paternal grandmother had a skin cancer. No personal history of skin cancer.   Past Medical History:   Diagnosis Date     Alcohol abuse, in remission     Remission since      ASCUS on Pap smear 11    HPV-pos. 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, in remission (H)     Since May 2001     Other, mixed, or unspecified nondependent drug abuse, in remission     In remission since  (cocaine and ectasy)     Restless leg syndrome        Past Surgical History:   Procedure Laterality Date     ABDOMEN SURGERY  2010    Csection     C ANESTH,SURG BREAST RECONSTRUCTIVE      Breast augmentation     COSMETIC SURGERY  2007    Breast surgery     ZZC NONSPECIFIC PROCEDURE   &      x 3, previous tear in cervix        Family History   Adopted: Yes   Problem Relation Age of Onset     Skin Cancer Paternal Grandmother      Unknown/Adopted No family hx of         Pt is adopted     Macular Degeneration No family hx of      Glaucoma No  family hx of        Social History     Socioeconomic History     Marital status: Single     Spouse name: Not on file     Number of children: 3     Years of education: Not on file     Highest education level: Not on file   Occupational History     Employer: NONE    Social Needs     Financial resource strain: Not on file     Food insecurity     Worry: Not on file     Inability: Not on file     Transportation needs     Medical: Not on file     Non-medical: Not on file   Tobacco Use     Smoking status: Current Every Day Smoker     Packs/day: 0.00     Years: 5.00     Pack years: 0.00     Types: Cigarettes     Smokeless tobacco: Current User     Tobacco comment: I'm using the vape   Substance and Sexual Activity     Alcohol use: No     Comment: none     Drug use: Yes     Types: Methamphetamines, Other, Amphetamines, GHB     Comment: GHB     Sexual activity: Yes     Partners: Female, Male     Birth control/protection: Condom, Female Surgical     Comment: Tubal Ligation   Lifestyle     Physical activity     Days per week: Not on file     Minutes per session: Not on file     Stress: Not on file   Relationships     Social connections     Talks on phone: Not on file     Gets together: Not on file     Attends Yarsani service: Not on file     Active member of club or organization: Not on file     Attends meetings of clubs or organizations: Not on file     Relationship status: Not on file     Intimate partner violence     Fear of current or ex partner: Not on file     Emotionally abused: Not on file     Physically abused: Not on file     Forced sexual activity: Not on file   Other Topics Concern     Parent/sibling w/ CABG, MI or angioplasty before 65F 55M? No   Social History Narrative     Not on file       Outpatient Encounter Medications as of 11/16/2020   Medication Sig Dispense Refill     topiramate (TOPAMAX) 50 MG tablet Take 1 tablet (50 mg) by mouth daily for 7 days, THEN 2 tablets (100 mg) daily. 30 tablet 2     No  facility-administered encounter medications on file as of 11/16/2020.        Review Of Systems:  Skin: spots  Eyes: negative  Ears/Nose/Throat: negative  Respiratory: No shortness of breath, dyspnea on exertion, cough, or hemoptysis  Cardiovascular: negative  Gastrointestinal: negative  Genitourinary: negative  Musculoskeletal: negative  Neurologic: negative  Psychiatric: negative  Hematologic/Lymphatic/Immunologic: negative  Endocrine: negative      Objective:     /82   Eyes: Conjunctivae/lids: Normal   ENT: Lips:  Normal  MSK: Normal  Cardiovascular: Peripheral edema none  Pulm: Breathing Normal  Neuro/Psych: Orientation: A/O x 3. Normal; Mood/Affect: Normal, NAD, WDWN  Pt accompanied by: self  Following areas examined: face ( wearing mask), neck, back  Valdes skin type:ii   Findings:  Well circumscribed lobular papule with symmetric color distribution on back  Well circumscribed macules with symmetric color distribution on back and face  Chavez WD smooth patch on right temple      Assessment and Plan:     1)Benign nevi, Lentigines     I discussed the specifics of tumor, prognosis, and genetics of benign lesions.  I explained that treatment of these lesions would be purely cosmetic and not medically neccessary.  I discussed with patient different removal options including lightening agens cryotherapy, cautery and /or laser.  Lesion may recur and/or may not completely resolve. May need additional treatment. Recommend daily sunscreen  Signs and Symptoms of non-melanoma skin cancer and ABCDEs of melanoma reviewed with patient. Patient encouraged to perform monthly self skin exams and educated on how to perform them. UV precautions reviewed with patient. Patient was asked about new or changing moles/lesions on body.   Wear a sunscreen with at least SPF 30 on your face, ears, neck and V of the chest daily. Wear sunscreen on other areas of the body if those areas are exposed to the sun throughout the day.  Sunscreens can contain physical and/or chemical blockers. Physical blockers are less likely to clog pores, these include zinc oxide and titanium dioxide. Reapply every two hour and after swimming. Sunscreen examples include Neutrogena CeraVe, Blue Lizard, Elta MD and many others.    Proper skin care from [facility]:    -Eliminate harsh soaps as they strip the natural oils from the skin, often resulting in dry itchy skin ( i.e. Dial, Zest, [place])  -Use mild soaps such as Cetaphil or Dove Sensitive Skin in the shower. You do not need to use soap on arms, legs, and trunk every time you shower unless visibly soiled.   -Avoid hot or cold showers.  -After showering, lightly dry off and apply moisturizing within 2-3 minutes. This will help trap moisture in the skin.   -Aggressive use of a moisturizer at least 1-2 times a day to the entire body (including -Vanicream, Cetaphil, Aquaphor or Cerave) and moisturize hands after every washing.  -We recommend using moisturizers that come in a tub that needs to be scooped out, not a pump. This has more of an oil base. It will hold moisture in your skin much better than a water base moisturizer. The above recommended are non-pore clogging.               Follow up in yearly FBE        Again, thank you for allowing me to participate in the care of your patient.        Sincerely,        Selam De La O PA-C

## 2020-11-16 NOTE — TELEPHONE ENCOUNTER
Spoke with Pt and gave her the telephone number from Dr. Nava's 11/3/20 Mental Health Referral at [facility] [phone]      Also, stated to Pt, when calling the [facility] telephone number to ask if Ms. Funmilayo Griffin  [address] [address] Tel: [phone] is in the [facility]?    MAYO Wu [facility] Referral Rep

## 2020-11-16 NOTE — PATIENT INSTRUCTIONS
Proper skin care from [facility]:    -Eliminate harsh soaps as they strip the natural oils from the skin, often resulting in dry itchy skin ( i.e. Dial, Zest, [place])  -Use mild soaps such as Cetaphil or Dove Sensitive Skin in the shower. You do not need to use soap on arms, legs, and trunk every time you shower unless visibly soiled.   -Avoid hot or cold showers.  -After showering, lightly dry off and apply moisturizing within 2-3 minutes. This will help trap moisture in the skin.   -Aggressive use of a moisturizer at least 1-2 times a day to the entire body (including -Vanicream, Cetaphil, Aquaphor or Cerave) and moisturize hands after every washing.  -We recommend using moisturizers that come in a tub that needs to be scooped out, not a pump. This has more of an oil base. It will hold moisture in your skin much better than a water base moisturizer. The above recommended are non-pore clogging.      Wear a sunscreen with at least SPF 30 on your face, ears, neck and V of the chest daily. Wear sunscreen on other areas of the body if those areas are exposed to the sun throughout the day. Sunscreens can contain physical and/or chemical blockers. Physical blockers are less likely to clog pores, these include zinc oxide and titanium dioxide. Reapply every two hour and after swimming. Sunscreen examples include Neutrogena, CeraVe, Blue Lizard, Elta MD and many others.    UV radiation  UVA radiation remains constant throughout the day and throughout the year. It is a longer wavelength than UVB and therefore penetrates deeper into the skin leading to immediate and delayed tanning, photoaging, and skin cancer. 70-80% of UVA and UVB radiation occurs between the hours of 10am-2pm.  UVB radiation  UVB radiation causes the most harmful effects and is more significant during the summer months. However, snow and ice can reflect UVB radiation leading to skin damage during the winter months as well. UVB radiation is  responsible for tanning, burning, inflammation, delayed erythema (pinkness), pigmentation (brown spots), and skin cancer.     I recommend self monthly full body exams and yearly full body exams with a dermatology provider. If you develop a new or changing lesion please follow up for examination. Most skin cancers are pink and scaly or pink and pearly. However, we do see blue/brown/black skin cancers.  Consider the ABCDEs of melanoma when giving yourself your monthly full body exam ( don't forget the groin, buttocks, feet, toes, etc). A-asymmetry, B-borders, C-color, D-diameter, E-elevation or evolving. If you see any of these changes please follow up in clinic. If you cannot see your back I recommend purchasing a hand held mirror to use with a larger wall mirror.

## 2021-01-15 ENCOUNTER — HEALTH MAINTENANCE LETTER (OUTPATIENT)
Age: 46
End: 2021-01-15

## 2021-01-22 ENCOUNTER — TELEPHONE (OUTPATIENT)
Dept: FAMILY MEDICINE | Facility: CLINIC | Age: 46
End: 2021-01-22

## 2021-01-22 NOTE — TELEPHONE ENCOUNTER
----- Message from Alexis Arndt sent at 1/21/2021  3:28 PM CST -----  Regarding: Referral Outcome      Maria Esther Haro is scheduled for a Psychiatry initial appointment appointment on 1/26/2021 with an external clinic.    Appointment Date: 1/26/2021  Appointment Time: 1:30 PM  Location: [facility]  Vanesa Gallegos CNP,PMFLORENTINOP,RN  [address] [place] [address]  [phone]    Thank you for your referral,  [facility] Outpatient Intake

## 2021-03-19 ENCOUNTER — OFFICE VISIT (OUTPATIENT)
Dept: URGENT CARE | Facility: URGENT CARE | Age: 46
End: 2021-03-19
Payer: COMMERCIAL

## 2021-03-19 VITALS
WEIGHT: 147 LBS | TEMPERATURE: 96.8 F | SYSTOLIC BLOOD PRESSURE: 129 MMHG | HEIGHT: 64 IN | HEART RATE: 99 BPM | BODY MASS INDEX: 25.1 KG/M2 | DIASTOLIC BLOOD PRESSURE: 88 MMHG

## 2021-03-19 DIAGNOSIS — F41.9 ANXIETY: ICD-10-CM

## 2021-03-19 DIAGNOSIS — L29.9 ITCHING: Primary | ICD-10-CM

## 2021-03-19 PROCEDURE — 99214 OFFICE O/P EST MOD 30 MIN: CPT | Performed by: FAMILY MEDICINE

## 2021-03-19 RX ORDER — HYDROXYZINE HYDROCHLORIDE 25 MG/1
25 TABLET, FILM COATED ORAL EVERY 8 HOURS PRN
Qty: 30 TABLET | Refills: 0 | Status: SHIPPED | OUTPATIENT
Start: 2021-03-19 | End: 2022-01-18

## 2021-03-19 ASSESSMENT — MIFFLIN-ST. JEOR: SCORE: 1306.79

## 2021-03-19 NOTE — PROGRESS NOTES
"SUBJECTIVE:  Maria Esther Haro, a 45 year old female scheduled an appointment to discuss the following issues:     Itching  Anxiety    Medical, social, surgical, and family histories reviewed.     Urgent Care (itchy bump over various parts of body - onset two weeks ago)  46 yo female complaining of itching in her scalp, chest, extremities and everywhere on her body without any lesions.  She admits to smoking meth daily.  She also states she is under a lot of emotional stress---new roommate, court case, relationship issues, dog just .  Has been on Topamax as mood stabilizer for a couple of years, but off and on.  Will make appointment with her psychiatrist soon.  Difficulty sleeping.  Denies suicidal or homicidal ideation.    ROS:  See HPI.  No nausea/vomiting.  No fever/chills.  No chest pain/SOB.  No BM/urine problems.  No dizziness or syncope.      OBJECTIVE:  /88   Pulse 99   Temp 96.8  F (36  C) (Temporal)   Ht 1.626 m (5' 4\")   Wt 66.7 kg (147 lb)   BMI 25.23 kg/m    EXAM:  GENERAL APPEARANCE: alert and mild distress, anxious. GCS 15, no cyanosis or accessory muscle use  EYES: Eyes grossly normal to inspection, PERRL and conjunctivae and sclerae normal; pupils 3mm bilaterally  HENT: ear canals and TM's normal and nose and mouth without ulcers or lesions  NECK: no adenopathy, no asymmetry, masses, or scars and thyroid normal to palpation  RESP: lungs clear to auscultation - no rales, rhonchi or wheezes  CV: regular rates and rhythm, normal S1 S2, no S3 or S4 and no murmur, click or rub  LYMPHATICS: no cervical adenopathy  ABDOMEN: soft, nontender, without hepatosplenomegaly or masses and bowel sounds normal  MS: extremities normal- no gross deformities noted  SKIN: no suspicious lesions or rashes  NEURO: Normal strength and tone, mentation intact and speech normal; no focal neurological deficits, no delirium tremens    ASSESSMENT/PLAN:  (L29.9) Itching  (primary encounter diagnosis)  Comment: meth " and stress related likely  Plan: hydrOXYzine (ATARAX) 25 MG tablet        (F41.9) Anxiety  Comment: meth and stress related likely  Plan: hydrOXYzine (ATARAX) 25 MG tablet    Encourage pt to quit meth.  Keep hydrated.  Counseled re coping mechanism.  Pt to f/up PCP/psychiatrist within 1 week, sooner if no improvement or worsening.  Warning signs and symptoms explained.

## 2021-03-19 NOTE — PATIENT INSTRUCTIONS
Patient Education     Your Body s Response to Anxiety    Normal anxiety is part of the body s natural defense system. It's an alert to a threat that is unknown, vague, or comes from your own internal fears. While you re in this state, your feelings can range from a vague sense of worry to physical sensations such as a pounding heartbeat. These feelings make you want to react to the threat. An anxiety response is normal in many situations. But when you have an anxiety disorder, the same response can occur at the wrong times.   Anxiety can be helpful  Normal anxiety is a signal from your brain. It warns you of a threat. It's a normal response to help you prevent something. Or to decrease the bad effects of something you can't control. For example, anxiety is a normal response to situations that might harm your body, separate you from a loved one, or lose your job. The symptoms of anxiety can be physical and mental.   How does it feel?  People with anxiety may have:    Dizziness    Muscle tension or pain    Restlessness    Sleeplessness    Trouble focusing    Racing heartbeat    Fast breathing    Shaking or trembling    Stomachache    Diarrhea    Loss of energy    Sweating    Cold, clammy hands    Chest pain    Dry mouth  Anxiety can also be a problem  Anxiety can become a problem when it is hard to control, occurs for months, and interferes with important parts of your life. With an anxiety disorder, your body has the response described above, but in inappropriate ways. The response a person has depends on the anxiety disorder he or she has. With some disorders, the anxiety is way out of proportion to the threat that triggers it. With others, anxiety may occur even when there isn t a clear threat or trigger.   Who does it affect?  Some people are more likely to have lasting anxiety than others. It tends to run in families. And it affects more younger people than older people, and more women than men. But no age,  race, or gender is immune to anxiety problems.   Anxiety can be treated  The good news is that the anxiety that s disrupting your life can be treated. Check with your healthcare provider and rule out any physical problems that may be causing the anxiety symptoms. If an anxiety disorder is diagnosed, seek mental healthcare. This is an illness and it can respond to treatment. Most types of anxiety disorders will respond to talk therapy (counseling) and medicines. Working with your doctor or other healthcare provider, you can develop skills to help you cope with anxiety. You can also gain the perspective you need to overcome your fears. Good sources of support or guidance can be found at your local hospital, mental health clinic, or an employee assistance program.   How to cope with anxiety  Here are some things you can do to cope:    Do what you can.  Keep in mind that you can t control everything. Change what you can. And let the rest take its course.    Exercise. This is a great way to ease tension and help your body feel relaxed.    Stay away from caffeine and nicotine.  These can make anxiety symptoms worse.    Stay sober.  Don't use alcohol or unprescribed medicines. They only make things worse in the long run.    Learn more about anxiety disorders.  Keep track of helpful online resources and books you can use during stressful periods.    Try stress management. Try methods such as meditation.    Talk with others. Think about joining online or in-person support groups.    Get help. Find professional mental health services if your symptoms can't be managed or reduced with the above methods.  [employer] last reviewed this educational content on 4/1/2020 2000-2020 [employer]. All rights reserved. This information is not intended as a substitute for professional medical care. Always follow your healthcare professional's instructions.

## 2021-05-04 ENCOUNTER — MYC MEDICAL ADVICE (OUTPATIENT)
Dept: FAMILY MEDICINE | Facility: CLINIC | Age: 46
End: 2021-05-04

## 2021-05-26 ENCOUNTER — E-VISIT (OUTPATIENT)
Dept: FAMILY MEDICINE | Facility: CLINIC | Age: 46
End: 2021-05-26
Payer: COMMERCIAL

## 2021-05-26 DIAGNOSIS — R23.8 SCALP IRRITATION: ICD-10-CM

## 2021-05-26 DIAGNOSIS — B85.0 HEAD LICE: Primary | ICD-10-CM

## 2021-05-26 PROCEDURE — 99421 OL DIG E/M SVC 5-10 MIN: CPT | Performed by: PHYSICIAN ASSISTANT

## 2021-06-14 NOTE — PROGRESS NOTES
Assessment & Plan     Elevated blood pressure reading without diagnosis of hypertension  Repeat still elevated, labs updated today; patient to return to clinic in a few weeks for recheck and work on healthy diet and exercise, especially cutting down on soda.  - UA reflex to Microscopic and Culture  - Albumin Random Urine Quantitative with Creat Ratio    Screening for diabetes mellitus  - Comprehensive metabolic panel  - Hemoglobin A1c    Screening, anemia, deficiency, iron  - CBC with platelets differential  - Iron and iron binding capacity    Screening for thyroid disorder  - TSH with free T4 reflex    Encounter for vitamin deficiency screening  - Vitamin B12  - Vitamin D Deficiency    CARDIOVASCULAR SCREENING; LDL GOAL LESS THAN 160  - Lipid panel reflex to direct LDL Fasting    Review of prior external note(s) from - ED visit 2/27/21 and 7/18/20, virtual visit 4/1/20  30 minutes spent on the date of the encounter doing chart review, history and exam, documentation and further activities per the note       Patient Instructions     MENTAL HEALTH REFERRAL  - Adult; Psychiatry; Psychiatry; Other: [facility] [phone]            Did you know?      You can schedule a video visit for follow-up appointments as well as future appointments for certain conditions.  Please see the below link.     [email or web address]    If you have not already done so,  I encourage you to sign up for Brightstormt ([email or web address]).  This will allow you to review your results, securely communicate with a provider, and schedule virtual visits as well.      Return in about 3 weeks (around 7/6/2021) for BP Recheck, or sooner with worsening symptoms.    Hyun Beltran PA-C  [facility] is a 46 year old who presents for the following health issues     HPI     Checking for diabetes  She has intermittent nausea, no current nausea.    No lower back pain, fevers, headache, rhinorrhea, coughing, heartburn, nausea or diarrhea.   She can go through a 12 ounce soda can around 12 of those/day. She is drinking at least 3 at night. She has increased thirst.   She is having more urinary frequency and urgency. If she can't make it to the bathroom then she will have urinary incontinence.   She can't sleep at night.   She has been experiencing this for a couple of weeks.   She is not eating that much. No appetite.   No IV drug use, but history of methamphetamine and GHB use.    History of being on Topamax, but hasn't restarted since previously seen by provider and advised regular follow up with psychiatry. She was on Topamax to 100 mg Qday - which made symptoms more controlled. She is experiencing irritability and manic episodes. No alcohol use. No SI or HI.     Patient seen in ED 2/27/21:  PROBLEM-BASED HOSPITAL COURSE:   Maria Esther Haro is a 45 y.o. woman with a history of substance use admitted 2/27/2021 with agitated delirium and encephalopathy, she got into altrecation with police at her home and required ketamine for sedation at the scene and also required intubation for airway protection. Met SIRS criteria on arrival w tachycardia, tachypnea, and leukocytosis, however no clear infectious source. Other differentials include primary psychiatric disorder, withdrawal. No evidence of metabolic abnormality or trauma (head CT negative), not consistent with seizure/post-ictal state. Was intubation for airway protection, minimal vent requirements. Successfully weaned off sedation and extubated. Denies suicidal ideation and was able to ambulate and eat before deemed to be clinically stable to discharge.     DISCHARGE DIAGNOSES:   Agitated delirium, Resolved   Acute toxic encephalopathy, resolved   Acute respiratory failure, intubation for airway protection, resolved  Elevated lactate,resolved   Leukocytosis,resolved         BP Readings from Last 2  Encounters:   06/15/21 (!) 144/90   03/19/21 129/88     LDL Cholesterol Calculated (mg/dL)   Date Value   06/26/2009 87                 Social History     Tobacco Use     Smoking status: Current Every Day Smoker     Packs/day: 0.00     Years: 5.00     Pack years: 0.00     Types: Cigarettes     Smokeless tobacco: Current User     Tobacco comment: I'm using the vape   Substance Use Topics     Alcohol use: No     Comment: none     Drug use: Yes     Types: Methamphetamines, Other, Amphetamines, GHB     Comment: GHB     PHQ 7/15/2019 9/16/2019 3/11/2020   PHQ-9 Total Score 3 7 15   Q9: Thoughts of better off dead/self-harm past 2 weeks Not at all Not at all Not at all     STEVEN-7 SCORE 7/15/2019 9/16/2019 3/11/2020   Total Score 4 (minimal anxiety) - 11 (moderate anxiety)   Total Score 4 8 11     Last PHQ-9 3/11/2020   1.  Little interest or pleasure in doing things 2   2.  Feeling down, depressed, or hopeless 2   3.  Trouble falling or staying asleep, or sleeping too much 3   4.  Feeling tired or having little energy 3   5.  Poor appetite or overeating 1   6.  Feeling bad about yourself 1   7.  Trouble concentrating 1   8.  Moving slowly or restless 2   Q9: Thoughts of better off dead/self-harm past 2 weeks 0   PHQ-9 Total Score 15   Difficulty at work, home, or with people -     STEVEN-7  3/11/2020   1. Feeling nervous, anxious, or on edge 3   2. Not being able to stop or control worrying 2   3. Worrying too much about different things 1   4. Trouble relaxing 1   5. Being so restless that it is hard to sit still 1   6. Becoming easily annoyed or irritable 3   7. Feeling afraid, as if something awful might happen 0   STEVEN-7 Total Score 11   If you checked any problems, how difficult have they made it for you to do your work, take care of things at home, or get along with other people? -       Suicide Assessment Five-step Evaluation and Treatment (SAFE-T)    Review of Systems   Constitutional, HEENT, cardiovascular, pulmonary,  "GI, , musculoskeletal, neuro, skin, endocrine and psych systems are negative, except as otherwise noted.      Objective    BP (!) 144/90   Pulse 99   Temp 97.4  F (36.3  C) (Tympanic)   Resp 16   Ht 1.626 m (5' 4\")   Wt 77.6 kg (171 lb)   SpO2 98%   BMI 29.35 kg/m    Body mass index is 29.35 kg/m .  Physical Exam   GENERAL: healthy, alert and no distress  RESP: lungs clear to auscultation - no rales, rhonchi or wheezes  CV: regular rate and rhythm, normal S1 S2, no S3 or S4, no murmur, click or rub, no peripheral edema and peripheral pulses strong  MS: no gross musculoskeletal defects noted, no edema  NEURO: Normal strength and tone, mentation intact and speech normal  PSYCH: mentation appears normal, affect normal/bright            "

## 2021-06-15 ENCOUNTER — OFFICE VISIT (OUTPATIENT)
Dept: FAMILY MEDICINE | Facility: CLINIC | Age: 46
End: 2021-06-15
Payer: COMMERCIAL

## 2021-06-15 VITALS
TEMPERATURE: 97.4 F | OXYGEN SATURATION: 98 % | DIASTOLIC BLOOD PRESSURE: 90 MMHG | WEIGHT: 171 LBS | BODY MASS INDEX: 29.19 KG/M2 | RESPIRATION RATE: 16 BRPM | SYSTOLIC BLOOD PRESSURE: 144 MMHG | HEIGHT: 64 IN | HEART RATE: 99 BPM

## 2021-06-15 DIAGNOSIS — Z13.6 CARDIOVASCULAR SCREENING; LDL GOAL LESS THAN 160: ICD-10-CM

## 2021-06-15 DIAGNOSIS — R03.0 ELEVATED BLOOD PRESSURE READING WITHOUT DIAGNOSIS OF HYPERTENSION: Primary | ICD-10-CM

## 2021-06-15 DIAGNOSIS — Z13.21 ENCOUNTER FOR VITAMIN DEFICIENCY SCREENING: ICD-10-CM

## 2021-06-15 DIAGNOSIS — Z13.1 SCREENING FOR DIABETES MELLITUS: ICD-10-CM

## 2021-06-15 DIAGNOSIS — Z13.29 SCREENING FOR THYROID DISORDER: ICD-10-CM

## 2021-06-15 DIAGNOSIS — Z13.0 SCREENING, ANEMIA, DEFICIENCY, IRON: ICD-10-CM

## 2021-06-15 LAB
ALBUMIN UR-MCNC: NEGATIVE MG/DL
APPEARANCE UR: CLEAR
BASOPHILS # BLD AUTO: 0.1 10E9/L (ref 0–0.2)
BASOPHILS NFR BLD AUTO: 0.6 %
BILIRUB UR QL STRIP: NEGATIVE
COLOR UR AUTO: YELLOW
DIFFERENTIAL METHOD BLD: ABNORMAL
EOSINOPHIL # BLD AUTO: 0.2 10E9/L (ref 0–0.7)
EOSINOPHIL NFR BLD AUTO: 2.4 %
ERYTHROCYTE [DISTWIDTH] IN BLOOD BY AUTOMATED COUNT: 15.2 % (ref 10–15)
GLUCOSE UR STRIP-MCNC: NEGATIVE MG/DL
HBA1C MFR BLD: 5.5 % (ref 0–5.6)
HCT VFR BLD AUTO: 45.5 % (ref 35–47)
HGB BLD-MCNC: 14.2 G/DL (ref 11.7–15.7)
HGB UR QL STRIP: ABNORMAL
KETONES UR STRIP-MCNC: NEGATIVE MG/DL
LEUKOCYTE ESTERASE UR QL STRIP: NEGATIVE
LYMPHOCYTES # BLD AUTO: 2.5 10E9/L (ref 0.8–5.3)
LYMPHOCYTES NFR BLD AUTO: 29 %
MCH RBC QN AUTO: 28 PG (ref 26.5–33)
MCHC RBC AUTO-ENTMCNC: 31.2 G/DL (ref 31.5–36.5)
MCV RBC AUTO: 90 FL (ref 78–100)
MONOCYTES # BLD AUTO: 0.7 10E9/L (ref 0–1.3)
MONOCYTES NFR BLD AUTO: 7.9 %
NEUTROPHILS # BLD AUTO: 5.3 10E9/L (ref 1.6–8.3)
NEUTROPHILS NFR BLD AUTO: 60.1 %
NITRATE UR QL: NEGATIVE
PH UR STRIP: 6 PH (ref 5–7)
PLATELET # BLD AUTO: 333 10E9/L (ref 150–450)
RBC # BLD AUTO: 5.08 10E12/L (ref 3.8–5.2)
RBC #/AREA URNS AUTO: NORMAL /HPF
SOURCE: ABNORMAL
SP GR UR STRIP: 1.01 (ref 1–1.03)
UROBILINOGEN UR STRIP-ACNC: 0.2 EU/DL (ref 0.2–1)
WBC # BLD AUTO: 8.8 10E9/L (ref 4–11)
WBC #/AREA URNS AUTO: NORMAL /HPF

## 2021-06-15 PROCEDURE — 83540 ASSAY OF IRON: CPT | Performed by: PHYSICIAN ASSISTANT

## 2021-06-15 PROCEDURE — 80061 LIPID PANEL: CPT | Performed by: PHYSICIAN ASSISTANT

## 2021-06-15 PROCEDURE — 81001 URINALYSIS AUTO W/SCOPE: CPT | Performed by: PHYSICIAN ASSISTANT

## 2021-06-15 PROCEDURE — 83036 HEMOGLOBIN GLYCOSYLATED A1C: CPT | Performed by: PHYSICIAN ASSISTANT

## 2021-06-15 PROCEDURE — 82043 UR ALBUMIN QUANTITATIVE: CPT | Performed by: PHYSICIAN ASSISTANT

## 2021-06-15 PROCEDURE — 36415 COLL VENOUS BLD VENIPUNCTURE: CPT | Performed by: PHYSICIAN ASSISTANT

## 2021-06-15 PROCEDURE — 80050 GENERAL HEALTH PANEL: CPT | Performed by: PHYSICIAN ASSISTANT

## 2021-06-15 PROCEDURE — 82306 VITAMIN D 25 HYDROXY: CPT | Performed by: PHYSICIAN ASSISTANT

## 2021-06-15 PROCEDURE — 99214 OFFICE O/P EST MOD 30 MIN: CPT | Performed by: PHYSICIAN ASSISTANT

## 2021-06-15 PROCEDURE — 83550 IRON BINDING TEST: CPT | Performed by: PHYSICIAN ASSISTANT

## 2021-06-15 PROCEDURE — 82607 VITAMIN B-12: CPT | Performed by: PHYSICIAN ASSISTANT

## 2021-06-15 ASSESSMENT — MIFFLIN-ST. JEOR: SCORE: 1410.65

## 2021-06-15 NOTE — NURSING NOTE
"Chief Complaint   Patient presents with     Consult     check for diabetes-thirsty and weight gain     initial BP (!) 147/92 (BP Location: Left arm, Cuff Size: Adult Regular)   Pulse 99   Temp 97.4  F (36.3  C) (Tympanic)   Resp 16   Ht 1.626 m (5' 4\")   Wt 77.6 kg (171 lb)   SpO2 98%   BMI 29.35 kg/m   Estimated body mass index is 29.35 kg/m  as calculated from the following:    Height as of this encounter: 1.626 m (5' 4\").    Weight as of this encounter: 77.6 kg (171 lb).  BP completed using cuff size: regular.  L  arm      Health Maintenance that is potentially due pending provider review:  NONE    n/a    Jack Bashir ma  "

## 2021-06-15 NOTE — PATIENT INSTRUCTIONS
MENTAL HEALTH REFERRAL  - Adult; Psychiatry; Psychiatry; Other: [facility] [phone]            Did you know?      You can schedule a video visit for follow-up appointments as well as future appointments for certain conditions.  Please see the below link.     [email or web address]    If you have not already done so,  I encourage you to sign up for BevyUpt ([email or web address]).  This will allow you to review your results, securely communicate with a provider, and schedule virtual visits as well.

## 2021-06-16 ENCOUNTER — MYC MEDICAL ADVICE (OUTPATIENT)
Dept: FAMILY MEDICINE | Facility: CLINIC | Age: 46
End: 2021-06-16

## 2021-06-16 LAB
CREAT UR-MCNC: 64 MG/DL
DEPRECATED CALCIDIOL+CALCIFEROL SERPL-MC: 26 UG/L (ref 20–75)
IRON SATN MFR SERPL: NORMAL % (ref 15–46)
IRON SERPL-MCNC: NORMAL UG/DL (ref 35–180)
MICROALBUMIN UR-MCNC: <5 MG/L
MICROALBUMIN/CREAT UR: NORMAL MG/G CR (ref 0–25)
TIBC SERPL-MCNC: NORMAL UG/DL (ref 240–430)
VIT B12 SERPL-MCNC: 1531 PG/ML (ref 193–986)

## 2021-06-17 LAB
ALBUMIN SERPL-MCNC: 4.1 G/DL (ref 3.4–5)
ALP SERPL-CCNC: 104 U/L (ref 40–150)
ALT SERPL W P-5'-P-CCNC: 20 U/L (ref 0–50)
ANION GAP SERPL CALCULATED.3IONS-SCNC: 13 MMOL/L (ref 3–14)
AST SERPL W P-5'-P-CCNC: 28 U/L (ref 0–45)
BILIRUB SERPL-MCNC: 0.5 MG/DL (ref 0.2–1.3)
BUN SERPL-MCNC: 9 MG/DL (ref 7–30)
CALCIUM SERPL-MCNC: 9.1 MG/DL (ref 8.5–10.1)
CHLORIDE SERPL-SCNC: 107 MMOL/L (ref 94–109)
CHOLEST SERPL-MCNC: 228 MG/DL
CO2 SERPL-SCNC: 21 MMOL/L (ref 20–32)
CREAT SERPL-MCNC: 0.8 MG/DL (ref 0.52–1.04)
GFR SERPL CREATININE-BSD FRML MDRD: 89 ML/MIN/{1.73_M2}
GLUCOSE SERPL-MCNC: 85 MG/DL (ref 70–99)
HDLC SERPL-MCNC: 74 MG/DL
LDLC SERPL CALC-MCNC: 133 MG/DL
NONHDLC SERPL-MCNC: 154 MG/DL
POTASSIUM SERPL-SCNC: 4.7 MMOL/L (ref 3.4–5.3)
PROT SERPL-MCNC: 7.9 G/DL (ref 6.8–8.8)
SODIUM SERPL-SCNC: 141 MMOL/L (ref 133–144)
TRIGL SERPL-MCNC: 106 MG/DL
TSH SERPL DL<=0.005 MIU/L-ACNC: 0.99 MU/L (ref 0.4–4)

## 2021-06-17 NOTE — RESULT ENCOUNTER NOTE
"Stef Mayo  Your attached labs are reassuringly within normal limits with no concerns for diabetes at this time.  Continue to work on a healthy diet and exercise for your cholesterol levels though.  Please contact the office with any questions or concerns.    Hyun Hernandez \"Cam\" LETY Beltran    "

## 2021-06-20 ENCOUNTER — MYC MEDICAL ADVICE (OUTPATIENT)
Dept: FAMILY MEDICINE | Facility: CLINIC | Age: 46
End: 2021-06-20

## 2021-06-22 ENCOUNTER — MYC REFILL (OUTPATIENT)
Dept: FAMILY MEDICINE | Facility: CLINIC | Age: 46
End: 2021-06-22

## 2021-06-22 DIAGNOSIS — F31.32 BIPOLAR AFFECTIVE DISORDER, CURRENTLY DEPRESSED, MODERATE (H): ICD-10-CM

## 2021-06-23 ENCOUNTER — OFFICE VISIT (OUTPATIENT)
Dept: OPHTHALMOLOGY | Facility: CLINIC | Age: 46
End: 2021-06-23
Attending: OPTOMETRIST
Payer: COMMERCIAL

## 2021-06-23 DIAGNOSIS — Z53.9 ERRONEOUS ENCOUNTER--DISREGARD: Primary | ICD-10-CM

## 2021-06-23 DIAGNOSIS — F31.32 BIPOLAR AFFECTIVE DISORDER, CURRENTLY DEPRESSED, MODERATE (H): ICD-10-CM

## 2021-06-23 PROCEDURE — G0463 HOSPITAL OUTPT CLINIC VISIT: HCPCS

## 2021-06-23 PROCEDURE — 10000001 PR ERRONEOUS ENCOUNTER--DISREGARD: Performed by: OPTOMETRIST

## 2021-06-23 ASSESSMENT — REFRACTION_WEARINGRX
OD_CYLINDER: +1.00
OD_SPHERE: -2.25
OS_AXIS: 027
OS_SPHERE: -2.25
SPECS_TYPE: SVL
OS_CYLINDER: +0.50
OD_AXIS: 003

## 2021-06-23 ASSESSMENT — REFRACTION_MANIFEST
OS_ADD: +1.25
OS_AXIS: 030
OD_AXIS: 005
OD_ADD: +1.25
OD_CYLINDER: +1.00
OS_CYLINDER: +0.50
OD_SPHERE: -2.00
OS_SPHERE: -2.25

## 2021-06-23 ASSESSMENT — VISUAL ACUITY
METHOD: SNELLEN - LINEAR
OS_CC: 20/20
OD_CC+: -2
OS_CC+: -1
OD_CC: 20/20

## 2021-06-23 ASSESSMENT — TONOMETRY
IOP_METHOD: ICARE
OD_IOP_MMHG: 16
OS_IOP_MMHG: 16

## 2021-06-23 ASSESSMENT — CONF VISUAL FIELD
OD_NORMAL: 1
OS_NORMAL: 1
METHOD: COUNTING FINGERS

## 2021-06-23 NOTE — NURSING NOTE
Chief Complaints and History of Present Illnesses   Patient presents with     Follow Up     Choroidal nevus of left eye      Chief Complaint(s) and History of Present Illness(es)     Follow Up     Laterality: left eye    Onset: gradual    Onset: years ago    Course: gradually worsening    Associated symptoms: dryness, glare and haloes.  Negative for eye pain, tearing, flashes, floaters and photophobia    Treatments tried: no treatments    Pain scale: 0/10    Comments: Choroidal nevus of left eye               Comments     Pt states vision is horrible.  She is here because vision is getting worse.  States eyes are always dry but does not use AT's.  Does not drive at night due to glare/halos.  No ocular meds.    WANDA Ritchie June 23, 2021 9:43 AM

## 2021-06-25 RX ORDER — TOPIRAMATE 50 MG/1
TABLET, FILM COATED ORAL
Qty: 30 TABLET | Refills: 2 | OUTPATIENT
Start: 2021-06-25 | End: 2021-08-01

## 2021-06-28 RX ORDER — TOPIRAMATE 50 MG/1
TABLET, FILM COATED ORAL
Qty: 30 TABLET | Refills: 2 | OUTPATIENT
Start: 2021-06-28

## 2021-07-08 ENCOUNTER — OFFICE VISIT (OUTPATIENT)
Dept: DERMATOLOGY | Facility: CLINIC | Age: 46
End: 2021-07-08
Payer: COMMERCIAL

## 2021-07-08 ENCOUNTER — MYC MEDICAL ADVICE (OUTPATIENT)
Dept: FAMILY MEDICINE | Facility: CLINIC | Age: 46
End: 2021-07-08

## 2021-07-08 VITALS — SYSTOLIC BLOOD PRESSURE: 135 MMHG | HEART RATE: 108 BPM | OXYGEN SATURATION: 100 % | DIASTOLIC BLOOD PRESSURE: 87 MMHG

## 2021-07-08 DIAGNOSIS — L65.9 HAIR LOSS: ICD-10-CM

## 2021-07-08 DIAGNOSIS — D48.5 NEOPLASM OF UNCERTAIN BEHAVIOR OF SKIN: Primary | ICD-10-CM

## 2021-07-08 DIAGNOSIS — L29.9 SCALP ITCH: ICD-10-CM

## 2021-07-08 LAB — TSH SERPL DL<=0.005 MIU/L-ACNC: 1.16 MU/L (ref 0.4–4)

## 2021-07-08 PROCEDURE — 99214 OFFICE O/P EST MOD 30 MIN: CPT | Mod: 25 | Performed by: PHYSICIAN ASSISTANT

## 2021-07-08 PROCEDURE — 88305 TISSUE EXAM BY PATHOLOGIST: CPT | Performed by: DERMATOLOGY

## 2021-07-08 PROCEDURE — 84443 ASSAY THYROID STIM HORMONE: CPT | Performed by: PHYSICIAN ASSISTANT

## 2021-07-08 PROCEDURE — 11104 PUNCH BX SKIN SINGLE LESION: CPT | Performed by: PHYSICIAN ASSISTANT

## 2021-07-08 PROCEDURE — 36415 COLL VENOUS BLD VENIPUNCTURE: CPT | Performed by: PHYSICIAN ASSISTANT

## 2021-07-08 RX ORDER — FLUOCINONIDE TOPICAL SOLUTION USP, 0.05% 0.5 MG/ML
SOLUTION TOPICAL
Qty: 50 ML | Refills: 3 | Status: SHIPPED | OUTPATIENT
Start: 2021-07-08 | End: 2022-01-18

## 2021-07-08 NOTE — PATIENT INSTRUCTIONS
Wound Care Instructions      [place] [phone]    [facility] [phone]    AFTER 24 HOURS YOU SHOULD REMOVE THE BANDAGE AND BEGIN DAILY DRESSING CHANGES AS FOLLOWS:     1) Remove Dressing.     2) Clean and dry the area with tap water using a Q-tip or sterile gauze pad.     3) Apply Vaseline, Aquaphor, Polysporin ointment or Bacitracin ointment over entire wound.  Do NOT use Neosporin ointment.     4) Cover the wound with a band-aid, or a sterile non-stick gauze pad and micropore paper tape      REPEAT THESE INSTRUCTIONS AT LEAST ONCE A DAY UNTIL THE WOUND HAS COMPLETELY HEALED.    It is an old wives tale that a wound heals better when it is exposed to air and allowed to dry out. The wound will heal faster with a better cosmetic result if it is kept moist with ointment and covered with a bandage.    **Do not let the wound dry out.**      Supplies Needed:      *Cotton tipped applicators (Q-tips)    *Polysporin Ointment or Bacitracin Ointment (NOT NEOSPORIN)    *Band-aids or non-stick gauze pads and micropore paper tape.      PATIENT INFORMATION:    During the healing process you will notice a number of changes. All wounds develop a small halo of redness surrounding the wound.  This means healing is occurring. Severe itching with extensive redness usually indicates sensitivity to the ointment or bandage tape used to dress the wound.  You should call our office if this develops.      Swelling  and/or discoloration around your surgical site is common, particularly when performed around the eye.    All wounds normally drain.  The larger the wound the more drainage there will be.  After 7-10 days, you will notice the wound beginning to shrink and new skin will begin to grow.  The wound is healed when you can see skin has formed over the entire area.  A healed wound has a healthy, shiny look to the surface and is red to dark pink in color to normalize.  Wounds may take approximately 4-6 weeks to heal.   Larger wounds may take 6-8 weeks.  After the wound is healed you may discontinue dressing changes.    You may experience a sensation of tightness as your wound heals. This is normal and will gradually subside.    Your healed wound may be sensitive to temperature changes. This sensitivity improves with time, but if you re having a lot of discomfort, try to avoid temperature extremes.    Patients frequently experience itching after their wound appears to have healed because of the continue healing under the skin.  Plain Vaseline will help relieve the itching.    POSSIBLE COMPLICATIONS    BLEEDIN. Leave the bandage in place.  2. Use tightly rolled up gauze or a cloth to apply direct pressure over the bandage for 30  minutes.  3. Reapply pressure for an additional 30 minutes if necessary  4. Use additional gauze and tape to maintain pressure once the bleeding has stopped.

## 2021-07-08 NOTE — LETTER
7/8/2021         RE: Maria Esther Haro  [address] [id number]        Dear Colleague,    Thank you for referring your patient, Maria Esther Haro, to the [facility]. Please see a copy of my visit note below.    HPI:   Chief complaints: Maria Esther Haro is a pleasant 46 year old female who presents for evaluation of scalp itch and hair loss. She has had an itchy scalp for about 3 years. It will itch all over intermittently. She does not have any visible rash or scale. She also has hair breakage. She does not lose an abnormal amount of hair from the root. She does bleach her hair and has been doing this for awhile. Unknown family history as she is adopted.           PHYSICAL EXAM:    /87 (BP Location: Left arm, Patient Position: Sitting, Cuff Size: Adult Regular)   Pulse 108   SpO2 100%   Skin exam performed as follows: Type 2 skin. Mood appropriate  Alert and Oriented X 3. Well developed, well nourished in no distress.  General appearance: Normal  Head including face: Normal  Eyes: conjunctiva and lids: Normal  Mouth: Lips, teeth, gums: Normal  Neck: Normal  Chest-breast/axillae: Normal  Back: Normal  Spleen and liver: Normal  Cardiovascular: Exam of peripheral vascular system by observation for swelling, varicosities, edema: Normal  Genitalia: groin, buttocks: Normal  Extremities: digits/nails (clubbing): Normal  Eccrine and Apocrine glands: Normal  Right upper extremity: Normal  Left upper extremity: Normal  Right lower extremity: Normal  Left lower extremity: Normal  Skin: Scalp and body hair: See below    1. Scalp largely normal; one small reddened area on the right. Hair density homogenous throughout. Hair shaft damaged and bleached.     ASSESSMENT/PLAN:     1. R/o cicatricial alopecia vs spong derm vs other.  1% Lidocaine with epinephrine for anesthetic, with sterile technique a 4 mm punch biopsy was used to obtain a biopsy specimen of the lesion. Hemostasis  was obtained by pressure and wound was sutured with two 4-0 sutures. Antibiotic dressing was applied, and wound care instructions provided. The specimen is labeled and sent to pathology for evaluation. The procedure was well tolerated without complications  --Start lidex BID x 1-2 weeks PRN for itching  --Check TSH today        Follow-up: pending path  CC:   Scribed By: Sonia Guerrero, MS, LETY          Again, thank you for allowing me to participate in the care of your patient.        Sincerely,        Sonia Guerrero PA-C

## 2021-07-08 NOTE — PROGRESS NOTES
HPI:   Chief complaints: Maria Esther Haro is a pleasant 46 year old female who presents for evaluation of scalp itch and hair loss. She has had an itchy scalp for about 3 years. It will itch all over intermittently. She does not have any visible rash or scale. She also has hair breakage. She does not lose an abnormal amount of hair from the root. She does bleach her hair and has been doing this for awhile. Unknown family history as she is adopted.           PHYSICAL EXAM:    /87 (BP Location: Left arm, Patient Position: Sitting, Cuff Size: Adult Regular)   Pulse 108   SpO2 100%   Skin exam performed as follows: Type 2 skin. Mood appropriate  Alert and Oriented X 3. Well developed, well nourished in no distress.  General appearance: Normal  Head including face: Normal  Eyes: conjunctiva and lids: Normal  Mouth: Lips, teeth, gums: Normal  Neck: Normal  Chest-breast/axillae: Normal  Back: Normal  Spleen and liver: Normal  Cardiovascular: Exam of peripheral vascular system by observation for swelling, varicosities, edema: Normal  Genitalia: groin, buttocks: Normal  Extremities: digits/nails (clubbing): Normal  Eccrine and Apocrine glands: Normal  Right upper extremity: Normal  Left upper extremity: Normal  Right lower extremity: Normal  Left lower extremity: Normal  Skin: Scalp and body hair: See below    1. Scalp largely normal; one small reddened area on the right. Hair density homogenous throughout. Hair shaft damaged and bleached.     ASSESSMENT/PLAN:     1. R/o cicatricial alopecia vs spong derm vs other.  1% Lidocaine with epinephrine for anesthetic, with sterile technique a 4 mm punch biopsy was used to obtain a biopsy specimen of the lesion. Hemostasis was obtained by pressure and wound was sutured with two 4-0 sutures. Antibiotic dressing was applied, and wound care instructions provided. The specimen is labeled and sent to pathology for evaluation. The procedure was well tolerated without  complications  --Start lidex BID x 1-2 weeks PRN for itching  --Check TSH today        Follow-up: pending path  CC:   Scribed By: Sonia Guerrero, MS, PASOFIA

## 2021-07-08 NOTE — NURSING NOTE
"Initial /87 (BP Location: Left arm, Patient Position: Sitting, Cuff Size: Adult Regular)   Pulse 108   SpO2 100%  Estimated body mass index is 29.35 kg/m  as calculated from the following:    Height as of 6/15/21: 1.626 m (5' 4\").    Weight as of 6/15/21: 77.6 kg (171 lb). .      "

## 2021-07-12 NOTE — TELEPHONE ENCOUNTER
MP,    Please see Syncronext message below.  Patient was No Show 6/29 and 7/6    Thanks,  Barbara Welsh RN     no

## 2021-07-12 NOTE — TELEPHONE ENCOUNTER
"Can't really make much sense of those messages....    Overall updates look good.  If currently having new symptoms, aka vomiting, needs to be seen.    Thanks  Hyun \"Cam\" LETY Beltran   "

## 2021-07-14 LAB — COPATH REPORT: NORMAL

## 2021-07-19 ENCOUNTER — ALLIED HEALTH/NURSE VISIT (OUTPATIENT)
Dept: DERMATOLOGY | Facility: CLINIC | Age: 46
End: 2021-07-19
Payer: COMMERCIAL

## 2021-07-19 DIAGNOSIS — Z48.01 ENCOUNTER FOR CHANGE OR REMOVAL OF SURGICAL WOUND DRESSING: Primary | ICD-10-CM

## 2021-07-19 PROCEDURE — 99207 PR NO CHARGE NURSE ONLY: CPT

## 2021-07-19 NOTE — NURSING NOTE
Pt returned to clinic for suture removal. Pt has no complaints, denies pain.     Sutures fell out after patient combed her hair. Site is fully healed.    Advised to watch for signs/sx of infection; spreading redness, drainage, odor, fever. Call or report promptly to clinic. Pt given written instructions and informed to rtc as needed.     Patient verbalized understanding.     KITTY Urena-BSN-PHN  [facility]  [phone]

## 2021-10-24 ENCOUNTER — HEALTH MAINTENANCE LETTER (OUTPATIENT)
Age: 46
End: 2021-10-24

## 2022-01-18 ENCOUNTER — OFFICE VISIT (OUTPATIENT)
Dept: FAMILY MEDICINE | Facility: CLINIC | Age: 47
End: 2022-01-18
Payer: COMMERCIAL

## 2022-01-18 ENCOUNTER — ANCILLARY PROCEDURE (OUTPATIENT)
Dept: GENERAL RADIOLOGY | Facility: CLINIC | Age: 47
End: 2022-01-18
Attending: FAMILY MEDICINE
Payer: COMMERCIAL

## 2022-01-18 VITALS
BODY MASS INDEX: 27.11 KG/M2 | DIASTOLIC BLOOD PRESSURE: 84 MMHG | OXYGEN SATURATION: 98 % | SYSTOLIC BLOOD PRESSURE: 130 MMHG | TEMPERATURE: 97.7 F | WEIGHT: 162.7 LBS | HEIGHT: 65 IN | HEART RATE: 93 BPM

## 2022-01-18 DIAGNOSIS — R10.84 ABDOMINAL PAIN, GENERALIZED: ICD-10-CM

## 2022-01-18 DIAGNOSIS — K58.2 IRRITABLE BOWEL SYNDROME WITH BOTH CONSTIPATION AND DIARRHEA: ICD-10-CM

## 2022-01-18 DIAGNOSIS — F13.20: ICD-10-CM

## 2022-01-18 DIAGNOSIS — R10.84 ABDOMINAL PAIN, GENERALIZED: Primary | ICD-10-CM

## 2022-01-18 DIAGNOSIS — F60.3 BORDERLINE PERSONALITY DISORDER (H): ICD-10-CM

## 2022-01-18 DIAGNOSIS — F31.32 BIPOLAR AFFECTIVE DISORDER, CURRENTLY DEPRESSED, MODERATE (H): ICD-10-CM

## 2022-01-18 DIAGNOSIS — F33.0 MILD EPISODE OF RECURRENT MAJOR DEPRESSIVE DISORDER (H): ICD-10-CM

## 2022-01-18 LAB
ALBUMIN SERPL-MCNC: 3.3 G/DL (ref 3.4–5)
ALBUMIN UR-MCNC: NEGATIVE MG/DL
ALP SERPL-CCNC: 87 U/L (ref 40–150)
ALT SERPL W P-5'-P-CCNC: 18 U/L (ref 0–50)
ANION GAP SERPL CALCULATED.3IONS-SCNC: 8 MMOL/L (ref 3–14)
APPEARANCE UR: CLEAR
AST SERPL W P-5'-P-CCNC: 11 U/L (ref 0–45)
BACTERIA #/AREA URNS HPF: ABNORMAL /HPF
BILIRUB SERPL-MCNC: 0.3 MG/DL (ref 0.2–1.3)
BILIRUB UR QL STRIP: NEGATIVE
BUN SERPL-MCNC: 9 MG/DL (ref 7–30)
CALCIUM SERPL-MCNC: 8.9 MG/DL (ref 8.5–10.1)
CHLORIDE BLD-SCNC: 108 MMOL/L (ref 94–109)
CO2 SERPL-SCNC: 23 MMOL/L (ref 20–32)
COLOR UR AUTO: YELLOW
CREAT SERPL-MCNC: 0.68 MG/DL (ref 0.52–1.04)
ERYTHROCYTE [DISTWIDTH] IN BLOOD BY AUTOMATED COUNT: 14.4 % (ref 10–15)
GFR SERPL CREATININE-BSD FRML MDRD: >90 ML/MIN/1.73M2
GLUCOSE BLD-MCNC: 82 MG/DL (ref 70–99)
GLUCOSE UR STRIP-MCNC: NEGATIVE MG/DL
HCT VFR BLD AUTO: 44.1 % (ref 35–47)
HGB BLD-MCNC: 13.9 G/DL (ref 11.7–15.7)
HGB UR QL STRIP: ABNORMAL
KETONES UR STRIP-MCNC: NEGATIVE MG/DL
LEUKOCYTE ESTERASE UR QL STRIP: NEGATIVE
LIPASE SERPL-CCNC: 126 U/L (ref 73–393)
MCH RBC QN AUTO: 28.9 PG (ref 26.5–33)
MCHC RBC AUTO-ENTMCNC: 31.5 G/DL (ref 31.5–36.5)
MCV RBC AUTO: 92 FL (ref 78–100)
NITRATE UR QL: NEGATIVE
PH UR STRIP: 5.5 [PH] (ref 5–7)
PLATELET # BLD AUTO: 321 10E3/UL (ref 150–450)
POTASSIUM BLD-SCNC: 4 MMOL/L (ref 3.4–5.3)
PROT SERPL-MCNC: 7 G/DL (ref 6.8–8.8)
RBC # BLD AUTO: 4.81 10E6/UL (ref 3.8–5.2)
RBC #/AREA URNS AUTO: ABNORMAL /HPF
SODIUM SERPL-SCNC: 139 MMOL/L (ref 133–144)
SP GR UR STRIP: 1.01 (ref 1–1.03)
SQUAMOUS #/AREA URNS AUTO: ABNORMAL /LPF
UROBILINOGEN UR STRIP-ACNC: 0.2 E.U./DL
WBC # BLD AUTO: 9.3 10E3/UL (ref 4–11)
WBC #/AREA URNS AUTO: ABNORMAL /HPF

## 2022-01-18 PROCEDURE — 85027 COMPLETE CBC AUTOMATED: CPT | Performed by: FAMILY MEDICINE

## 2022-01-18 PROCEDURE — 74019 RADEX ABDOMEN 2 VIEWS: CPT | Mod: FY | Performed by: RADIOLOGY

## 2022-01-18 PROCEDURE — 99215 OFFICE O/P EST HI 40 MIN: CPT | Performed by: FAMILY MEDICINE

## 2022-01-18 PROCEDURE — 83690 ASSAY OF LIPASE: CPT | Performed by: FAMILY MEDICINE

## 2022-01-18 PROCEDURE — 81001 URINALYSIS AUTO W/SCOPE: CPT | Performed by: FAMILY MEDICINE

## 2022-01-18 PROCEDURE — 80053 COMPREHEN METABOLIC PANEL: CPT | Performed by: FAMILY MEDICINE

## 2022-01-18 PROCEDURE — 36415 COLL VENOUS BLD VENIPUNCTURE: CPT | Performed by: FAMILY MEDICINE

## 2022-01-18 ASSESSMENT — ENCOUNTER SYMPTOMS
ARTHRALGIAS: 0
ANOREXIA: 1
HEMATURIA: 0
BELCHING: 0
NERVOUS/ANXIOUS: 1
NAUSEA: 1
FEVER: 0
DYSURIA: 0
DIARRHEA: 1
HEADACHES: 1
MYALGIAS: 0
FLATUS: 1
VOMITING: 1
SORE THROAT: 0
CONSTIPATION: 0
FREQUENCY: 1
ABDOMINAL PAIN: 1
HEMATOCHEZIA: 0

## 2022-01-18 ASSESSMENT — MIFFLIN-ST. JEOR: SCORE: 1387

## 2022-01-18 NOTE — PROGRESS NOTES
Assessment & Plan     Abdominal pain, generalized  I recommended checking an x-ray to evaluate symptoms further.  I personally reviewed the images with her and provided an initial interpretation.  There appears to be a moderate amount of stool present in the distal colon.  No free air.  The radiology report is pending.  I also recommended that we check labs as listed below and she will be notified of the results when they are available.  - XR Abdomen 2 Views; Future  - CBC with platelets; Future  - Comprehensive metabolic panel (BMP + Alb, Alk Phos, ALT, AST, Total. Bili, TP); Future  - Lipase; Future  - UA with Microscopic reflex to Culture - lab collect; Future    Irritable bowel syndrome with both constipation and diarrhea  Her symptoms appear consistent with irritable bowel syndrome.  Currently she is mostly experiencing constipation symptoms.  We discussed treatment options and I recommended she stay well-hydrated, get adequate fiber intake and she may add MiraLAX as needed.  We discussed stress reduction as well.  I explained that medications like SSRIs can be used to help with IBS symptoms, but I am hesitant to start something like this for her given her history of bipolar disorder.    Bipolar affective disorder, currently depressed, moderate (H)  She reports that symptoms are stable without medication.    Borderline personality disorder (H)  She reports that symptoms are stable without medication.    Mild episode of recurrent major depressive disorder (H)  She reports that symptoms are stable without medication.    Gamma-hydroxybutyrate (GHB) use disorder, moderate, dependence (H)  I encouraged her to stop using the GHB, but she is precontemplative about this.  It is possible that this medication could be contributing to her bowel symptoms.        40 minutes spent on the date of the encounter doing chart review, review of test results, interpretation of tests, patient visit and documentation               Return in about 2 weeks (around 2/1/2022) for Follow up if symptoms not improving..    Bill Goldstein DO  [facility]   Maria Esther is a 46 year old who presents for the following health issues     Abdominal Pain   This is a new problem. The current episode started 1 to 4 weeks ago. The problem occurs daily. The problem has been waxing and waning. The pain is located in the generalized abdominal region. The quality of the pain is aching, burning, cramping and a sensation of fullness. The pain is moderate. Associated symptoms include anorexia, diarrhea, flatus, nausea, vomiting, frequency and headaches. Pertinent negatives include fever, belching, hematochezia, melena, constipation, dysuria, hematuria, arthralgias and myalgias. The symptoms are relieved by passing flatus.      Answers for HPI/ROS submitted by the patient on 1/18/2022  Onset quality: gradual  Episode duration: 4 hours  Radiates to: periumbilical region, right flank    Pt. Is also experiencing headaches and has been spotting irregularly for the past month or so. They also have not gotten there period in the last 4 months.    Pain History:  When did you first notice your pain? -1 month ago, worse in the past week    Where in your body do you have pain? Abdominal/Flank Pain  Onset/Duration: Less than a week  Description:   Character: Cramping  Location: All quadrants   Intensity: moderate  Progression of Symptoms:  worsening  Accompanying Signs & Symptoms:  Fever/Chills: no  Gas/Bloating: YES.  With an urgency to have bowel movements at times.  Nausea: YES  Vomitting: YES  Diarrhea: no  Constipation: YES  Dysuria or Hematuria: no  History:   Trauma: no  Previous similar pain: no  Previous tests done: none  Precipitating factors:   Does the pain change with:     Food: no    Bowel Movement: no    Urination: no   Other factors:  no  Therapies tried and outcome: None  No LMP recorded.            Review of  "Systems   Constitutional: Negative for fever.   HENT: Negative for sore throat.    Gastrointestinal: Positive for abdominal pain, anorexia, diarrhea, flatus, nausea and vomiting. Negative for constipation, hematochezia and melena.   Genitourinary: Positive for frequency. Negative for dysuria and hematuria.   Musculoskeletal: Negative for arthralgias and myalgias.   Skin: Negative for rash.   Neurological: Positive for headaches.   Psychiatric/Behavioral: The patient is nervous/anxious.             Objective    /84   Pulse 93   Temp 97.7  F (36.5  C) (Temporal)   Ht 1.648 m (5' 4.88\")   Wt 73.8 kg (162 lb 11.2 oz)   SpO2 98%   BMI 27.17 kg/m    Body mass index is 27.17 kg/m .  Physical Exam   GENERAL: healthy, alert and no distress  EYES: Eyes grossly normal to inspection, conjunctivae and sclerae normal  NECK: no adenopathy, no asymmetry, masses, or scars and thyroid normal to palpation  RESP: lungs clear to auscultation - no rales, rhonchi or wheezes  CV: regular rate and rhythm, normal S1 S2, no S3 or S4, no murmur, click or rub, no peripheral edema and peripheral pulses strong  ABDOMEN: soft, mildly tender to palpation diffusely with no rebounding, guarding or rigidity.  No hepatosplenomegaly, no masses and bowel sounds normal  MS: no gross musculoskeletal defects noted, no edema  SKIN: no suspicious lesions or rashes  NEURO: Normal strength and tone, mentation intact and speech normal  PSYCH: mentation appears normal, affect normal/bright                "

## 2022-02-13 ENCOUNTER — HEALTH MAINTENANCE LETTER (OUTPATIENT)
Age: 47
End: 2022-02-13

## 2022-04-10 ENCOUNTER — HEALTH MAINTENANCE LETTER (OUTPATIENT)
Age: 47
End: 2022-04-10

## 2022-04-20 ENCOUNTER — MYC MEDICAL ADVICE (OUTPATIENT)
Dept: FAMILY MEDICINE | Facility: CLINIC | Age: 47
End: 2022-04-20
Payer: COMMERCIAL

## 2022-04-29 ENCOUNTER — OFFICE VISIT (OUTPATIENT)
Dept: URGENT CARE | Facility: URGENT CARE | Age: 47
End: 2022-04-29
Payer: COMMERCIAL

## 2022-04-29 VITALS
DIASTOLIC BLOOD PRESSURE: 99 MMHG | TEMPERATURE: 97.4 F | SYSTOLIC BLOOD PRESSURE: 144 MMHG | HEART RATE: 111 BPM | OXYGEN SATURATION: 95 %

## 2022-04-29 DIAGNOSIS — R10.84 ABDOMINAL PAIN, GENERALIZED: Primary | ICD-10-CM

## 2022-04-29 DIAGNOSIS — N93.9 ABNORMAL VAGINAL BLEEDING: ICD-10-CM

## 2022-04-29 LAB
ALBUMIN UR-MCNC: ABNORMAL MG/DL
APPEARANCE UR: ABNORMAL
BACTERIA #/AREA URNS HPF: ABNORMAL /HPF
BASOPHILS # BLD AUTO: 0 10E3/UL (ref 0–0.2)
BASOPHILS NFR BLD AUTO: 0 %
BILIRUB UR QL STRIP: ABNORMAL
COLOR UR AUTO: YELLOW
EOSINOPHIL # BLD AUTO: 0.2 10E3/UL (ref 0–0.7)
EOSINOPHIL NFR BLD AUTO: 2 %
ERYTHROCYTE [DISTWIDTH] IN BLOOD BY AUTOMATED COUNT: 13.8 % (ref 10–15)
GLUCOSE UR STRIP-MCNC: NEGATIVE MG/DL
HCG UR QL: NEGATIVE
HCT VFR BLD AUTO: 47.6 % (ref 35–47)
HGB BLD-MCNC: 15.1 G/DL (ref 11.7–15.7)
HGB UR QL STRIP: ABNORMAL
KETONES UR STRIP-MCNC: ABNORMAL MG/DL
LEUKOCYTE ESTERASE UR QL STRIP: NEGATIVE
LYMPHOCYTES # BLD AUTO: 1.8 10E3/UL (ref 0.8–5.3)
LYMPHOCYTES NFR BLD AUTO: 22 %
MCH RBC QN AUTO: 29 PG (ref 26.5–33)
MCHC RBC AUTO-ENTMCNC: 31.7 G/DL (ref 31.5–36.5)
MCV RBC AUTO: 92 FL (ref 78–100)
MONOCYTES # BLD AUTO: 1 10E3/UL (ref 0–1.3)
MONOCYTES NFR BLD AUTO: 13 %
MUCOUS THREADS #/AREA URNS LPF: PRESENT /LPF
NEUTROPHILS # BLD AUTO: 5.1 10E3/UL (ref 1.6–8.3)
NEUTROPHILS NFR BLD AUTO: 63 %
NITRATE UR QL: NEGATIVE
PH UR STRIP: 6 [PH] (ref 5–7)
PLATELET # BLD AUTO: 317 10E3/UL (ref 150–450)
RBC # BLD AUTO: 5.2 10E6/UL (ref 3.8–5.2)
RBC #/AREA URNS AUTO: ABNORMAL /HPF
SP GR UR STRIP: 1.02 (ref 1–1.03)
SQUAMOUS #/AREA URNS AUTO: ABNORMAL /LPF
UROBILINOGEN UR STRIP-ACNC: 1 E.U./DL
WBC # BLD AUTO: 8.1 10E3/UL (ref 4–11)
WBC #/AREA URNS AUTO: ABNORMAL /HPF

## 2022-04-29 PROCEDURE — 99214 OFFICE O/P EST MOD 30 MIN: CPT | Performed by: FAMILY MEDICINE

## 2022-04-29 PROCEDURE — 36416 COLLJ CAPILLARY BLOOD SPEC: CPT | Performed by: FAMILY MEDICINE

## 2022-04-29 PROCEDURE — 81001 URINALYSIS AUTO W/SCOPE: CPT | Performed by: FAMILY MEDICINE

## 2022-04-29 PROCEDURE — 81025 URINE PREGNANCY TEST: CPT | Performed by: FAMILY MEDICINE

## 2022-04-29 PROCEDURE — 85025 COMPLETE CBC W/AUTO DIFF WBC: CPT | Performed by: FAMILY MEDICINE

## 2022-04-29 NOTE — PROGRESS NOTES
SUBJECTIVE: Maria Esther Haro is a 47 year old female presenting with a chief complaint of abnormal vaginal bleeding.  Onset of symptoms was 5 week(s) ago.  Course of illness is waxing and waning.    Severity moderate  Current and Associated symptoms: abd cramps  Predisposing factors include None.    Past Medical History:   Diagnosis Date     Alcohol abuse, in remission     Remission since 2003     ASCUS on Pap smear 5/16/11    HPV-pos. 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, in remission (H)     Since May 2001     Other, mixed, or unspecified nondependent drug abuse, in remission     In remission since 2003 (cocaine and ectasy)     Restless leg syndrome      No Known Allergies  Social History     Tobacco Use     Smoking status: Current Every Day Smoker     Packs/day: 0.00     Years: 5.00     Pack years: 0.00     Types: Cigarettes     Smokeless tobacco: Current User     Tobacco comment: I'm using the vape   Substance Use Topics     Alcohol use: No     Comment: none       ROS:  SKIN: no rash  GI: no vomiting    OBJECTIVE:  BP (!) 144/99 (BP Location: Right arm, Patient Position: Sitting, Cuff Size: Adult Regular)   Pulse 111   Temp 97.4  F (36.3  C) (Tympanic)   SpO2 95% GENERAL APPEARANCE: healthy, alert and no distress  ABDOMEN:  soft, nontender, no HSM or masses and bowel sounds normal  SKIN: no suspicious lesions or rashes      ICD-10-CM    1. Abdominal pain, generalized  R10.84 UA with Microscopic reflex to Culture     HCG Qual, Urine ([id number])     CBC with Platelets & Differential     Urine Microscopic     norethindrone-ethinyl estradiol (ORTHO-NOVUM) 1-35 MG-MCG tablet   2. Abnormal vaginal bleeding  N93.9 norethindrone-ethinyl estradiol (ORTHO-NOVUM) 1-35 MG-MCG tablet     F/u GYN  Fluids/Rest, f/u if worse/not any better

## 2022-10-16 ENCOUNTER — HEALTH MAINTENANCE LETTER (OUTPATIENT)
Age: 47
End: 2022-10-16

## 2022-11-04 NOTE — PROGRESS NOTES
Pre-Visit Planning   Next 5 appointments (look out 90 days)    Nov 07, 2022  8:30 AM  (Arrive by 8:10 AM)  Adult Preventative Visit with Freddie Lou DO  [facility] ([facility] ) [id number] [place] [id number] [phone]        Appointment Notes for this encounter:   physical    Questionnaires Reviewed/Assigned  No additional questionnaires are needed    Patient preferred phone number: [phone]    Unable to reach patient and unable to leave voicemail.     Sneha Maxwell  [facility]

## 2022-11-07 ENCOUNTER — OFFICE VISIT (OUTPATIENT)
Dept: FAMILY MEDICINE | Facility: CLINIC | Age: 47
End: 2022-11-07
Payer: COMMERCIAL

## 2022-11-07 VITALS
HEIGHT: 65 IN | DIASTOLIC BLOOD PRESSURE: 102 MMHG | WEIGHT: 170 LBS | SYSTOLIC BLOOD PRESSURE: 144 MMHG | HEART RATE: 85 BPM | BODY MASS INDEX: 28.32 KG/M2 | OXYGEN SATURATION: 100 % | TEMPERATURE: 97.9 F | RESPIRATION RATE: 16 BRPM

## 2022-11-07 DIAGNOSIS — Z72.0 CURRENT EVERY DAY NICOTINE VAPING: ICD-10-CM

## 2022-11-07 DIAGNOSIS — Z12.11 SCREEN FOR COLON CANCER: ICD-10-CM

## 2022-11-07 DIAGNOSIS — Z12.31 VISIT FOR SCREENING MAMMOGRAM: ICD-10-CM

## 2022-11-07 DIAGNOSIS — G25.81 RESTLESS LEG SYNDROME: ICD-10-CM

## 2022-11-07 DIAGNOSIS — Z00.00 ROUTINE GENERAL MEDICAL EXAMINATION AT A HEALTH CARE FACILITY: ICD-10-CM

## 2022-11-07 DIAGNOSIS — R03.0 ELEVATED BLOOD PRESSURE READING WITHOUT DIAGNOSIS OF HYPERTENSION: ICD-10-CM

## 2022-11-07 DIAGNOSIS — H51.9 EYE MOVEMENT ABNORMALITY: ICD-10-CM

## 2022-11-07 DIAGNOSIS — F41.8 SITUATIONAL ANXIETY: ICD-10-CM

## 2022-11-07 LAB
ANION GAP SERPL CALCULATED.3IONS-SCNC: 10 MMOL/L (ref 3–14)
BUN SERPL-MCNC: 12 MG/DL (ref 7–30)
CALCIUM SERPL-MCNC: 9 MG/DL (ref 8.5–10.1)
CHLORIDE BLD-SCNC: 105 MMOL/L (ref 94–109)
CO2 SERPL-SCNC: 25 MMOL/L (ref 20–32)
CREAT SERPL-MCNC: 0.59 MG/DL (ref 0.52–1.04)
FERRITIN SERPL-MCNC: 12 NG/ML (ref 8–252)
GFR SERPL CREATININE-BSD FRML MDRD: >90 ML/MIN/1.73M2
GLUCOSE BLD-MCNC: 97 MG/DL (ref 70–99)
MAGNESIUM SERPL-MCNC: 2.4 MG/DL (ref 1.6–2.3)
POTASSIUM BLD-SCNC: 4.6 MMOL/L (ref 3.4–5.3)
SODIUM SERPL-SCNC: 140 MMOL/L (ref 133–144)

## 2022-11-07 PROCEDURE — 0124A COVID-19,PF,PFIZER BOOSTER BIVALENT: CPT | Performed by: FAMILY MEDICINE

## 2022-11-07 PROCEDURE — 91312 COVID-19,PF,PFIZER BOOSTER BIVALENT: CPT | Performed by: FAMILY MEDICINE

## 2022-11-07 PROCEDURE — 99396 PREV VISIT EST AGE 40-64: CPT | Mod: 25 | Performed by: FAMILY MEDICINE

## 2022-11-07 PROCEDURE — 83735 ASSAY OF MAGNESIUM: CPT | Performed by: FAMILY MEDICINE

## 2022-11-07 PROCEDURE — 82728 ASSAY OF FERRITIN: CPT | Performed by: FAMILY MEDICINE

## 2022-11-07 PROCEDURE — 90686 IIV4 VACC NO PRSV 0.5 ML IM: CPT | Performed by: FAMILY MEDICINE

## 2022-11-07 PROCEDURE — 80048 BASIC METABOLIC PNL TOTAL CA: CPT | Performed by: FAMILY MEDICINE

## 2022-11-07 PROCEDURE — 36415 COLL VENOUS BLD VENIPUNCTURE: CPT | Performed by: FAMILY MEDICINE

## 2022-11-07 PROCEDURE — 90471 IMMUNIZATION ADMIN: CPT | Performed by: FAMILY MEDICINE

## 2022-11-07 PROCEDURE — 99214 OFFICE O/P EST MOD 30 MIN: CPT | Mod: 25 | Performed by: FAMILY MEDICINE

## 2022-11-07 RX ORDER — HYDROXYZINE PAMOATE 50 MG/1
50 CAPSULE ORAL 3 TIMES DAILY PRN
Qty: 30 CAPSULE | Refills: 1 | Status: SHIPPED | OUTPATIENT
Start: 2022-11-07 | End: 2023-02-28

## 2022-11-07 RX ORDER — ROPINIROLE 0.25 MG/1
0.25 TABLET, FILM COATED ORAL AT BEDTIME
Qty: 30 TABLET | Refills: 2 | Status: SHIPPED | OUTPATIENT
Start: 2022-11-07 | End: 2023-02-28

## 2022-11-07 SDOH — HEALTH STABILITY: PHYSICAL HEALTH: ON AVERAGE, HOW MANY MINUTES DO YOU ENGAGE IN EXERCISE AT THIS LEVEL?: 10 MIN

## 2022-11-07 SDOH — ECONOMIC STABILITY: INCOME INSECURITY: HOW HARD IS IT FOR YOU TO PAY FOR THE VERY BASICS LIKE FOOD, HOUSING, MEDICAL CARE, AND HEATING?: PATIENT DECLINED

## 2022-11-07 SDOH — ECONOMIC STABILITY: TRANSPORTATION INSECURITY
IN THE PAST 12 MONTHS, HAS LACK OF TRANSPORTATION KEPT YOU FROM MEETINGS, WORK, OR FROM GETTING THINGS NEEDED FOR DAILY LIVING?: NO

## 2022-11-07 SDOH — ECONOMIC STABILITY: FOOD INSECURITY: WITHIN THE PAST 12 MONTHS, THE FOOD YOU BOUGHT JUST DIDN'T LAST AND YOU DIDN'T HAVE MONEY TO GET MORE.: PATIENT DECLINED

## 2022-11-07 SDOH — HEALTH STABILITY: PHYSICAL HEALTH: ON AVERAGE, HOW MANY DAYS PER WEEK DO YOU ENGAGE IN MODERATE TO STRENUOUS EXERCISE (LIKE A BRISK WALK)?: 2 DAYS

## 2022-11-07 SDOH — ECONOMIC STABILITY: TRANSPORTATION INSECURITY
IN THE PAST 12 MONTHS, HAS THE LACK OF TRANSPORTATION KEPT YOU FROM MEDICAL APPOINTMENTS OR FROM GETTING MEDICATIONS?: NO

## 2022-11-07 SDOH — ECONOMIC STABILITY: FOOD INSECURITY: WITHIN THE PAST 12 MONTHS, YOU WORRIED THAT YOUR FOOD WOULD RUN OUT BEFORE YOU GOT MONEY TO BUY MORE.: SOMETIMES TRUE

## 2022-11-07 SDOH — ECONOMIC STABILITY: INCOME INSECURITY: IN THE LAST 12 MONTHS, WAS THERE A TIME WHEN YOU WERE NOT ABLE TO PAY THE MORTGAGE OR RENT ON TIME?: NO

## 2022-11-07 ASSESSMENT — ENCOUNTER SYMPTOMS
FREQUENCY: 1
MYALGIAS: 1
EYE PAIN: 1
PARESTHESIAS: 1
NERVOUS/ANXIOUS: 1
BREAST MASS: 0
HEADACHES: 1
ABDOMINAL PAIN: 1
WEAKNESS: 1
CONSTIPATION: 1

## 2022-11-07 ASSESSMENT — PATIENT HEALTH QUESTIONNAIRE - PHQ9
SUM OF ALL RESPONSES TO PHQ QUESTIONS 1-9: 13
10. IF YOU CHECKED OFF ANY PROBLEMS, HOW DIFFICULT HAVE THESE PROBLEMS MADE IT FOR YOU TO DO YOUR WORK, TAKE CARE OF THINGS AT HOME, OR GET ALONG WITH OTHER PEOPLE: SOMEWHAT DIFFICULT
SUM OF ALL RESPONSES TO PHQ QUESTIONS 1-9: 13

## 2022-11-07 ASSESSMENT — SOCIAL DETERMINANTS OF HEALTH (SDOH)
HOW OFTEN DO YOU GET TOGETHER WITH FRIENDS OR RELATIVES?: NEVER
HOW OFTEN DO YOU ATTEND CHURCH OR RELIGIOUS SERVICES?: NEVER
DO YOU BELONG TO ANY CLUBS OR ORGANIZATIONS SUCH AS CHURCH GROUPS UNIONS, FRATERNAL OR ATHLETIC GROUPS, OR SCHOOL GROUPS?: NO
IN A TYPICAL WEEK, HOW MANY TIMES DO YOU TALK ON THE PHONE WITH FAMILY, FRIENDS, OR NEIGHBORS?: NEVER

## 2022-11-07 ASSESSMENT — LIFESTYLE VARIABLES
SKIP TO QUESTIONS 9-10: 1
HOW MANY STANDARD DRINKS CONTAINING ALCOHOL DO YOU HAVE ON A TYPICAL DAY: PATIENT DOES NOT DRINK
AUDIT-C TOTAL SCORE: 0
HOW OFTEN DO YOU HAVE SIX OR MORE DRINKS ON ONE OCCASION: NEVER
HOW OFTEN DO YOU HAVE A DRINK CONTAINING ALCOHOL: NEVER

## 2022-11-07 NOTE — PROGRESS NOTES
SUBJECTIVE:   CC: Maria Esther is an 47 year old who presents for preventive health visit.       Patient has been advised of split billing requirements and indicates understanding: Yes  Healthy Habits:     Getting at least 3 servings of Calcium per day:  NO    Bi-annual eye exam:  NO    Dental care twice a year:  NO    Sleep apnea or symptoms of sleep apnea:  Daytime drowsiness    Diet:  Regular (no restrictions)    Frequency of exercise:  None    Taking medications regularly:  Yes    PHQ-2 Total Score: 2    Additional concerns today:  Yes          Today's PHQ-2 Score:   PHQ-2 ( 1999 Pfizer) 11/7/2022   Q1: Little interest or pleasure in doing things 1   Q2: Feeling down, depressed or hopeless 1   PHQ-2 Score 2   PHQ-2 Total Score (12-17 Years)- Positive if 3 or more points; Administer PHQ-A if positive -   Q1: Little interest or pleasure in doing things Several days   Q2: Feeling down, depressed or hopeless Several days   PHQ-2 Score 2       Abuse: Current or Past (Physical, Sexual or Emotional) - No  Do you feel safe in your environment? Yes    Have you ever done Advance Care Planning? (For example, a Health Directive, POLST, or a discussion with a medical provider or your loved ones about your wishes): No, advance care planning information given to patient to review.  Patient declined advance care planning discussion at this time.    Social History     Tobacco Use     Smoking status: Every Day     Packs/day: 0.00     Years: 5.00     Pack years: 0.00     Types: Cigarettes, Other     Smokeless tobacco: Former     Tobacco comments:     I'm using the vape   Substance Use Topics     Alcohol use: No     Comment: none       Reviewed orders with patient.  Reviewed health maintenance and updated orders accordingly - Yes      Breast Cancer Screening:    Breast CA Risk Assessment (FHS-7) 11/7/2022   Do you have a family history of breast, colon, or ovarian cancer? No / Unknown         Pertinent mammograms are reviewed under  "the imaging tab.    History of abnormal Pap smear: NO - age 30-65 PAP every 5 years with negative HPV co-testing recommended  PAP / HPV Latest Ref Rng & Units 12/10/2019 10/31/2018 2/10/2016   PAP (Historical) - NIL NIL NIL   HPV16 NEG:Negative Negative Negative Negative   HPV18 NEG:Negative Negative Negative Negative   HRHPV NEG:Negative Negative Negative Negative     Reviewed and updated as needed this visit by clinical staff   Tobacco  Allergies    Med Hx  Surg Hx  Fam Hx  Soc Hx        Reviewed and updated as needed this visit by Provider         Fam Hx             Review of Systems   Eyes: Positive for pain and visual disturbance.   Cardiovascular: Positive for peripheral edema.   Gastrointestinal: Positive for abdominal pain and constipation.   Breasts:  Positive for tenderness. Negative for breast mass and discharge.   Genitourinary: Positive for frequency, pelvic pain, urgency and vaginal bleeding. Negative for vaginal discharge.   Musculoskeletal: Positive for myalgias.   Neurological: Positive for weakness, headaches and paresthesias.   Psychiatric/Behavioral: The patient is nervous/anxious.      Patient answers submitted prior to my exam pertaining to review of systems check in questions reviewed with patient.     She has had restless legs, severe at night, for about 2 months.  This is her primary concern at time of visit. She reports having gotten good relief of symptoms using ropinirole at a lower dosage in the past.  She would like a refill of this medication. She told me she will be in intermediate soon for a period of 2 months.  She wanted a prescription for her restless leg condition prior to serving this intermediate time.    Left eye seems to to have a gaze shifting to left, with pain \"inside\" eye for about 1.5 years. No recent eye exam.  No prior diagnosis of problem.    Patient has swelling in hands, ankles, and abdomen thinking it is related to being perimenopausal. Has been noted for over a year.  She " does not feel she needs a rheumatology referral or lab work-up for now.    Had had abd pain and constipation issues for about 6 months. Was advised to try MOM, with a little relief of her symptoms.  Her diet has not been ideal recently.  She feels that if she can focus on eating a good diet, and if needed take a laxative, or abdominal pain will not be a problem if she can have regular bowel movements.    Patient feels her urinary symptoms are related to being in perimenopause.  The symptoms include urinary frequency and urgency.  She does not feel she needs evaluation for this condition.    Patient has had vaginal spotting, having bleeding for 5 weeks about 6 months ago. She last noted some spotting a couple days last month.  She took an oral contraceptive pill for regulation of her menstruation, last taken this about 1 month ago.  She does not feel she needs to take this medication any further.    Declines GYN exam.     Patient has been under more emotional stress over recent legal issues, which is trending better.  No thoughts of self-harm.    Hydroxyzine has helped anxiety in the past.  She would like a prescription for this medication to take as needed.    Has been vaping after stopping tobacco smoking about 2 weeks ago. She is decreasing the nicotine amount gradually.     Drank alcohol 1-2 shots last week getting off methamphetamine use.  She does not feel she is in danger of abusing alcohol.  She is hopeful that she will be able to permanently discontinue use of methamphetamine.    I discussed her elevated blood pressure reading from this visit.  Her blood pressure has been highly variable over several years.  Considering this, and her recent use of methamphetamine, alcohol, and current vaping and recent tobacco smoking, I am hopeful that her blood pressure will improve while she is in snf, and not using these legal and illegal substances.  She notes that she will not be smoking tobacco while in snf.  I  "encouraged her to follow-up soon after her release from half-way with her primary care provider to discuss management.    OBJECTIVE:   BP (!) 144/102 (Cuff Size: Adult Regular)   Pulse 85   Temp 97.9  F (36.6  C)   Resp 16   Ht 1.645 m (5' 4.75\")   Wt 77.1 kg (170 lb)   LMP 09/05/2022 (Approximate)   SpO2 100%   BMI 28.51 kg/m    Physical Exam  General: Vital signs reviewed.  Patient is in no acute appearing distress.  Breathing appears nonlabored.  Patient is alert and oriented ×3.      ENT: Ear exam shows bilateral tympanic membranes to be clear without injection, nasal turbinates show no injection or edema, no pharyngeal injection or exudate.    Neck: supple with no adenoapthy, palpable abnormal masses, or thyroid abnormality.    Eyes: No scleral, lid, or periorbital injection or edema noted.  No eye mattering noted.  Corneas are clear. Pupils are equal round and reactive to light with normal consensual eye movement except for left eye having slow beat nystagmus with looking far to left.     Heart: Heart rate is regular without murmur.    Lungs: Lungs are clear to auscultation with good airflow bilaterally.    Abdomen:  Abdomen is soft, nontender.  No palpable abnormal masses or organomegaly.  Bowel sounds are normal.    Back: No areas of tenderness.    Skin: Warm and dry, with no rash or abnormal lesions noted.    Extremities: No lower leg edema noted.  No joint edema or restricted range of motion noted.    Neuro: No acute focal deficits or other abnormalities noted.    Psych: Patient is very pleasant, making good eye contact, with clear and fluent speech.  Answers questions appropriately. No psychomotor agitation.     Declines gyn exam.       ASSESSMENT/PLAN:   Maria Esther was seen today for physical.    Diagnoses and all orders for this visit:  See after visit summary and result note from studies for helpful information and advice given to patient.    Routine general medical examination at [facility]" "facility  -     INFLUENZA VACCINE IM > 6 MONTHS VALENT IIV4 (AFLURIA/FLUZONE)  -     COVID-19,PF,PFIZER BOOSTER BIVALENT (12+YRS)    Restless leg syndrome  -     rOPINIRole (REQUIP) 0.25 MG tablet; Take 1 tablet (0.25 mg) by mouth At Bedtime  -     Basic metabolic panel  -     Iron and iron binding capacity; Future  -     Ferritin; Future  -     Magnesium; Future  -     Cancel: Iron and iron binding capacity  -     Ferritin  -     Magnesium  -     Iron & Iron Binding Capacity; Future  -     Cancel: Iron & Iron Binding Capacity    Eye movement abnormality  -     Adult Eye  Referral; Future    Current every day nicotine vaping    Elevated blood pressure reading without diagnosis of hypertension    Situational anxiety  -     hydrOXYzine (VISTARIL) 50 MG capsule; Take 1 capsule (50 mg) by mouth 3 times daily as needed for anxiety    Screen for colon cancer  -     Fecal colorectal cancer screen (FIT); Future  -     Fecal colorectal cancer screen (FIT)    Visit for screening mammogram  -     MA SCREENING DIGITAL BILAT - Future  (s+30); Future    Other orders  -     REVIEW OF HEALTH MAINTENANCE PROTOCOL ORDERS              COUNSELING:  Reviewed preventive health counseling, as reflected in patient instructions    Estimated body mass index is 28.51 kg/m  as calculated from the following:    Height as of this encounter: 1.645 m (5' 4.75\").    Weight as of this encounter: 77.1 kg (170 lb).        She reports that she has been smoking cigarettes and other. She has quit using smokeless tobacco.  Nicotine/Tobacco Cessation Plan:   See discussion under review of systems.      Counseling Resources:  ATP IV Guidelines  Pooled Cohorts Equation Calculator  Breast Cancer Risk Calculator  BRCA-Related Cancer Risk Assessment: FHS-7 Tool  FRAX Risk Assessment  ICSI Preventive Guidelines  Dietary Guidelines for Americans, 2010  USDA's MyPlate  ASA Prophylaxis  Lung CA Screening    Freddie Lou DO  [facility] " LC  Answers for HPI/ROS submitted by the patient on 11/7/2022  If you checked off any problems, how difficult have these problems made it for you to do your work, take care of things at home, or get along with other people?: Somewhat difficult  PHQ9 TOTAL SCORE: 13

## 2022-11-07 NOTE — PATIENT INSTRUCTIONS
Good to meet you! We will contact  you about your lab results by telephone or mail.   Preventive Health Recommendations  Female Ages 40 to 49    Yearly exam:   See your health care provider every year in order to  Review health changes.   Discuss preventive care.    Review your medicines if your doctor prescribed any.    Get a Pap test every three years (unless you have an abnormal result and your provider advises testing more often).    If you get Pap tests with HPV test, you only need to test every 5 years, unless you have an abnormal result. You do not need a Pap test if your uterus was removed (hysterectomy) and you have not had cancer.    You should be tested each year for STDs (sexually transmitted diseases), if you're at risk.   Ask your doctor if you should have a mammogram.    Have a colonoscopy (test for colon cancer) if someone in your family has had colon cancer or polyps before age 50.     Have a cholesterol test every 5 years.     Have a diabetes test (fasting glucose) after age 45. If you are at risk for diabetes, you should have this test every 3 years.    Shots: Get a flu shot each year. Get a tetanus shot every 10 years.     Nutrition:   Eat at least 5 servings of fruits and vegetables each day.  Eat whole-grain bread, whole-wheat pasta and brown rice instead of white grains and rice.  Get adequate Calcium and Vitamin D.      Lifestyle  Exercise at least 150 minutes a week (an average of 30 minutes a day, 5 days a week). This will help you control your weight and prevent disease.  Limit alcohol to one drink per day.  No smoking.   Wear sunscreen to prevent skin cancer.  See your dentist every six months for an exam and cleaning.

## 2023-01-30 NOTE — PROGRESS NOTES
HPI:  Patient complains of double vision - horizontal. Patient complains of eye strain in the left eye > right eye. The left eye drifts out occasionally at the end of the day, and that is when she sees occasional double vision. Symptoms have been occurring for the past year. There is eye pain left eye > right eye.       Pertinent Medical History:    Migraine    Alcohol abuse    Substance use disorder    Methamphetamine abuse    HSV-2    Ocular History:    Patient is adopted.     Choroidal nevus, left eye.    Dry Eye Syndrome, both eyes.     Eye Medications:    None    Assessment and Plan:  1.   Dry Eye Syndrome, both eyes.    Contributing to eye pain.     Preservative free artificial tears 4 times daily both eyes. Refresh or systance.     2.   Myopia with astigmatism, both eyes.     Distance only glasses for now. Hold off on bi-focal.     3.   Choroidal Nevus, left eye. New    No high risk features.     Recommend UV protection.     Fundus photo 02/01/2023     Recommend annual dilated eye exam.     4.    Cataract, both eyes.     Not visually significant. Monitor.     5.    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     No diplopia seen in office.     Patient sees occasional horizontal diplopia when she is tired. Also, the left eye drifts out occasionally when she is tired as well. Motilities are full which is re-assuring. The eyes are aligned (orthophoria) in different positions of gazes. No obvious exotropia, esotropia, hypotropia, or hypertropia in both eyes.     Can try patching when she sees double. If diplopia becomes bothersome, can consider prisms.     Medical History:  Past Medical History:   Diagnosis Date     Alcohol abuse, in remission     Remission since 2003     ASCUS on Pap smear 5/16/11    HPV-pos. unable to assign carcinogenicity     Depressive disorder 1/1/1999    Severe     Depressive disorder, not elsewhere classified      Migraine      Nondependent amphetamine or related acting sympathomimetic  abuse, in remission (H)     Since May 2001     Other, mixed, or unspecified nondependent drug abuse, in remission     In remission since 2003 (cocaine and ectasy)     Restless leg syndrome        Medications:  Current Outpatient Medications   Medication Sig Dispense Refill     hydrOXYzine (VISTARIL) 50 MG capsule Take 1 capsule (50 mg) by mouth 3 times daily as needed for anxiety 30 capsule 1     rOPINIRole (REQUIP) 0.25 MG tablet Take 1 tablet (0.25 mg) by mouth At Bedtime 30 tablet 2     Complete documentation of historical and exam elements from today's encounter can be found in the full encounter summary report (not reduplicated in this progress note). I personally obtained the chief complaint(s) and history of present illness.  I confirmed and edited as necessary the review of systems, past medical/surgical history, family history, social history, and examination findings as documented by others; and I examined the patient myself. I personally reviewed the relevant tests, images, and reports as documented above. I formulated and edited as necessary the assessment and plan and discussed the findings and management plan with the patient and family. - Jodie Segundo, CHER

## 2023-02-01 ENCOUNTER — OFFICE VISIT (OUTPATIENT)
Dept: FAMILY MEDICINE | Facility: CLINIC | Age: 48
End: 2023-02-01
Payer: COMMERCIAL

## 2023-02-01 ENCOUNTER — OFFICE VISIT (OUTPATIENT)
Dept: OPHTHALMOLOGY | Facility: CLINIC | Age: 48
End: 2023-02-01
Attending: OPTOMETRIST
Payer: COMMERCIAL

## 2023-02-01 VITALS
SYSTOLIC BLOOD PRESSURE: 126 MMHG | DIASTOLIC BLOOD PRESSURE: 78 MMHG | HEART RATE: 92 BPM | RESPIRATION RATE: 18 BRPM | OXYGEN SATURATION: 100 % | TEMPERATURE: 97.8 F | WEIGHT: 178.5 LBS | BODY MASS INDEX: 29.93 KG/M2

## 2023-02-01 DIAGNOSIS — H04.123 DRY EYE SYNDROME OF BOTH EYES: ICD-10-CM

## 2023-02-01 DIAGNOSIS — H52.13 MYOPIA OF BOTH EYES: ICD-10-CM

## 2023-02-01 DIAGNOSIS — D31.32 CHOROIDAL NEVUS OF LEFT EYE: Primary | ICD-10-CM

## 2023-02-01 DIAGNOSIS — H25.13 NUCLEAR SENILE CATARACT OF BOTH EYES: ICD-10-CM

## 2023-02-01 DIAGNOSIS — J02.9 ACUTE PHARYNGITIS, UNSPECIFIED ETIOLOGY: Primary | ICD-10-CM

## 2023-02-01 LAB
DEPRECATED S PYO AG THROAT QL EIA: NEGATIVE
GROUP A STREP BY PCR: NOT DETECTED

## 2023-02-01 PROCEDURE — 99213 OFFICE O/P EST LOW 20 MIN: CPT | Performed by: PHYSICIAN ASSISTANT

## 2023-02-01 PROCEDURE — 92004 COMPRE OPH EXAM NEW PT 1/>: CPT | Performed by: OPTOMETRIST

## 2023-02-01 PROCEDURE — 92015 DETERMINE REFRACTIVE STATE: CPT

## 2023-02-01 PROCEDURE — 92250 FUNDUS PHOTOGRAPHY W/I&R: CPT | Performed by: OPTOMETRIST

## 2023-02-01 PROCEDURE — 87651 STREP A DNA AMP PROBE: CPT | Performed by: PHYSICIAN ASSISTANT

## 2023-02-01 PROCEDURE — G0463 HOSPITAL OUTPT CLINIC VISIT: HCPCS | Mod: 25

## 2023-02-01 ASSESSMENT — VISUAL ACUITY
OS_SC: 20/100
METHOD: SNELLEN - LINEAR
OD_PH_SC: 20/40
OS_PH_SC: 20/40
OD_SC: 20/80
OS_SC+: -1
OD_SC+: -1

## 2023-02-01 ASSESSMENT — TONOMETRY
OD_IOP_MMHG: 20
OS_IOP_MMHG: 20
IOP_METHOD: TONOPEN

## 2023-02-01 ASSESSMENT — REFRACTION_MANIFEST
OS_CYLINDER: +0.75
OS_ADD: +2.00
OD_AXIS: 016
OS_SPHERE: -2.50
OD_SPHERE: -2.50
OS_AXIS: 035
OD_ADD: +2.00
OD_CYLINDER: +0.25

## 2023-02-01 ASSESSMENT — CONF VISUAL FIELD
OD_NORMAL: 1
OS_INFERIOR_NASAL_RESTRICTION: 0
OS_NORMAL: 1
OS_SUPERIOR_NASAL_RESTRICTION: 0
OD_INFERIOR_TEMPORAL_RESTRICTION: 0
METHOD: COUNTING FINGERS
OD_INFERIOR_NASAL_RESTRICTION: 0
OS_SUPERIOR_TEMPORAL_RESTRICTION: 0
OS_INFERIOR_TEMPORAL_RESTRICTION: 0
OD_SUPERIOR_TEMPORAL_RESTRICTION: 0
OD_SUPERIOR_NASAL_RESTRICTION: 0

## 2023-02-01 ASSESSMENT — REFRACTION_WEARINGRX
OD_CYLINDER: +1.00
OS_CYLINDER: +0.50
OD_AXIS: 005
OS_AXIS: 030
OD_ADD: +1.50
OS_SPHERE: -2.25
OS_ADD: +1.50
OD_SPHERE: -2.00

## 2023-02-01 ASSESSMENT — SLIT LAMP EXAM - LIDS
COMMENTS: NORMAL
COMMENTS: NORMAL

## 2023-02-01 ASSESSMENT — PATIENT HEALTH QUESTIONNAIRE - PHQ9
10. IF YOU CHECKED OFF ANY PROBLEMS, HOW DIFFICULT HAVE THESE PROBLEMS MADE IT FOR YOU TO DO YOUR WORK, TAKE CARE OF THINGS AT HOME, OR GET ALONG WITH OTHER PEOPLE: NOT DIFFICULT AT ALL
SUM OF ALL RESPONSES TO PHQ QUESTIONS 1-9: 4
SUM OF ALL RESPONSES TO PHQ QUESTIONS 1-9: 4

## 2023-02-01 ASSESSMENT — EXTERNAL EXAM - RIGHT EYE: OD_EXAM: NORMAL

## 2023-02-01 ASSESSMENT — EXTERNAL EXAM - LEFT EYE: OS_EXAM: NORMAL

## 2023-02-01 NOTE — NURSING NOTE
Chief Complaints and History of Present Illnesses   Patient presents with     Eye Pain Left Eye     Chief Complaint(s) and History of Present Illness(es)     Eye Pain Left Eye           Comments    Eye strain in LE for the past year. Pt feeling that LE is drifting out more. Pt notes double vision (side by side). Pt usually sees it when she is tired.  No new flashes or floaters. No redness. Dryness in both eyes, pt does not use artificial tears.    TRAM Rosas February 1, 2023 8:14 AM

## 2023-02-01 NOTE — PROGRESS NOTES
Subjective:    Maria Esther Haro is a 47 year old female who presents with chief complaint of swollen tonsils.  She was at the dentist yesterday and he said that her right side tonsil was swollen as compared to her left.  He thought she should get it looked at.  She is maybe having a little bit of throat irritation but not a lot.  She is having a lot of dental work done, so it is hard for her to  that.  No known sick contacts, no fevers.  She is a smoker.      Patient Active Problem List   Diagnosis     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     Restless leg syndrome     Mild major depression (H)     Alcohol abuse, in remission     HSV-2 (herpes simplex virus 2) infection     Borderline personality disorder (H)     Bipolar affective disorder, currently depressed, moderate (H)     Substance use disorder     Gamma-hydroxybutyrate (GHB) use disorder, moderate, dependence (H)     Methamphetamine abuse (H)       Current Outpatient Medications:      hydrOXYzine (VISTARIL) 50 MG capsule, Take 1 capsule (50 mg) by mouth 3 times daily as needed for anxiety, Disp: 30 capsule, Rfl: 1     rOPINIRole (REQUIP) 0.25 MG tablet, Take 1 tablet (0.25 mg) by mouth At Bedtime, Disp: 30 tablet, Rfl: 2      Objective:   Allergies:  Patient has no known allergies.    Vitals:  Vitals:    02/01/23 1122   BP: 126/78   BP Location: Left arm   Patient Position: Sitting   Cuff Size: Adult Large   Pulse: 92   Resp: 18   Temp: 97.8  F (36.6  C)   TempSrc: Temporal   SpO2: 100%   Weight: 81 kg (178 lb 8 oz)       Body mass index is 29.93 kg/m .    Vital signs reviewed.  General: Patient is alert and oriented x 3, in no apparent distress  Ears: TMs are non-erythematous with good light reflex bilaterally  Throat: no erythema, edema or exudate noted  Lymphatic: no anterior cervical lymph node enlargement  Cardiac: regular rate and rhythm, no murmurs  Pulmonary: lungs clear to auscultation bilaterally, no crackles,  rales, rhonchi, or wheezing noted    Results for orders placed or performed in visit on 02/01/23   Streptococcus A Rapid Screen w/Reflex to PCR     Status: Normal    Specimen: Throat; Swab   Result Value Ref Range    Group A Strep antigen Negative Negative     Strep culture pending    Assessment and Plan:   1. Acute pharyngitis, unspecified etiology  Main symptom was tonsillitis on the right side, observed while she was at the dentist yesterday.  Exam today is fairly normal.  I will follow-up with pending strep test.  If that is negative, patient is not really having a lot of pain or trouble swallowing, so I think she could just continue to monitor symptoms for now.  Follow-up if worsening or other concerns.  - Streptococcus A Rapid Screen w/Reflex to PCR  - Group A Streptococcus PCR Throat Swab       This dictation uses voice recognition software, which may contain typographical errors.

## 2023-02-21 ENCOUNTER — MYC MEDICAL ADVICE (OUTPATIENT)
Dept: FAMILY MEDICINE | Facility: CLINIC | Age: 48
End: 2023-02-21
Payer: COMMERCIAL

## 2023-02-21 ASSESSMENT — ANXIETY QUESTIONNAIRES
GAD7 TOTAL SCORE: 10
IF YOU CHECKED OFF ANY PROBLEMS ON THIS QUESTIONNAIRE, HOW DIFFICULT HAVE THESE PROBLEMS MADE IT FOR YOU TO DO YOUR WORK, TAKE CARE OF THINGS AT HOME, OR GET ALONG WITH OTHER PEOPLE: VERY DIFFICULT
3. WORRYING TOO MUCH ABOUT DIFFERENT THINGS: SEVERAL DAYS
4. TROUBLE RELAXING: NEARLY EVERY DAY
GAD7 TOTAL SCORE: 10
6. BECOMING EASILY ANNOYED OR IRRITABLE: SEVERAL DAYS
8. IF YOU CHECKED OFF ANY PROBLEMS, HOW DIFFICULT HAVE THESE MADE IT FOR YOU TO DO YOUR WORK, TAKE CARE OF THINGS AT HOME, OR GET ALONG WITH OTHER PEOPLE?: VERY DIFFICULT
7. FEELING AFRAID AS IF SOMETHING AWFUL MIGHT HAPPEN: NOT AT ALL
1. FEELING NERVOUS, ANXIOUS, OR ON EDGE: SEVERAL DAYS
7. FEELING AFRAID AS IF SOMETHING AWFUL MIGHT HAPPEN: NOT AT ALL
GAD7 TOTAL SCORE: 10
2. NOT BEING ABLE TO STOP OR CONTROL WORRYING: SEVERAL DAYS
5. BEING SO RESTLESS THAT IT IS HARD TO SIT STILL: NEARLY EVERY DAY

## 2023-02-21 ASSESSMENT — PATIENT HEALTH QUESTIONNAIRE - PHQ9
SUM OF ALL RESPONSES TO PHQ QUESTIONS 1-9: 12
10. IF YOU CHECKED OFF ANY PROBLEMS, HOW DIFFICULT HAVE THESE PROBLEMS MADE IT FOR YOU TO DO YOUR WORK, TAKE CARE OF THINGS AT HOME, OR GET ALONG WITH OTHER PEOPLE: SOMEWHAT DIFFICULT
SUM OF ALL RESPONSES TO PHQ QUESTIONS 1-9: 12

## 2023-02-28 ENCOUNTER — OFFICE VISIT (OUTPATIENT)
Dept: FAMILY MEDICINE | Facility: CLINIC | Age: 48
End: 2023-02-28
Payer: COMMERCIAL

## 2023-02-28 VITALS
TEMPERATURE: 98.4 F | SYSTOLIC BLOOD PRESSURE: 145 MMHG | DIASTOLIC BLOOD PRESSURE: 86 MMHG | HEART RATE: 114 BPM | WEIGHT: 175.7 LBS | HEIGHT: 65 IN | BODY MASS INDEX: 29.27 KG/M2 | OXYGEN SATURATION: 97 % | RESPIRATION RATE: 20 BRPM

## 2023-02-28 DIAGNOSIS — F43.21 GRIEF REACTION: Primary | ICD-10-CM

## 2023-02-28 DIAGNOSIS — F41.8 SITUATIONAL ANXIETY: ICD-10-CM

## 2023-02-28 DIAGNOSIS — G25.81 RESTLESS LEG SYNDROME: ICD-10-CM

## 2023-02-28 PROCEDURE — 99213 OFFICE O/P EST LOW 20 MIN: CPT | Performed by: PHYSICIAN ASSISTANT

## 2023-02-28 RX ORDER — ROPINIROLE 0.25 MG/1
0.25 TABLET, FILM COATED ORAL AT BEDTIME
Qty: 30 TABLET | Refills: 2 | Status: SHIPPED | OUTPATIENT
Start: 2023-02-28 | End: 2023-11-24

## 2023-02-28 RX ORDER — VENLAFAXINE HYDROCHLORIDE 37.5 MG/1
CAPSULE, EXTENDED RELEASE ORAL
Qty: 74 CAPSULE | Refills: 1 | Status: SHIPPED | OUTPATIENT
Start: 2023-02-28 | End: 2023-12-14

## 2023-02-28 RX ORDER — HYDROXYZINE PAMOATE 50 MG/1
50 CAPSULE ORAL 3 TIMES DAILY PRN
Qty: 30 CAPSULE | Refills: 1 | Status: SHIPPED | OUTPATIENT
Start: 2023-02-28 | End: 2023-11-26

## 2023-02-28 ASSESSMENT — PATIENT HEALTH QUESTIONNAIRE - PHQ9
SUM OF ALL RESPONSES TO PHQ QUESTIONS 1-9: 12
10. IF YOU CHECKED OFF ANY PROBLEMS, HOW DIFFICULT HAVE THESE PROBLEMS MADE IT FOR YOU TO DO YOUR WORK, TAKE CARE OF THINGS AT HOME, OR GET ALONG WITH OTHER PEOPLE: SOMEWHAT DIFFICULT

## 2023-02-28 ASSESSMENT — PAIN SCALES - GENERAL: PAINLEVEL: MODERATE PAIN (4)

## 2023-02-28 ASSESSMENT — ANXIETY QUESTIONNAIRES: GAD7 TOTAL SCORE: 10

## 2023-02-28 NOTE — PROGRESS NOTES
"  Assessment & Plan     Grief reaction  Situational anxiety  Long standing, chronic, UNcontrolled- recent family loss with request for therapist, referral placed and options discussed with patient. Willing to start daily medicine to potentially help with acute symptoms but also history of menopausal symptoms (mood changes, sleep issues and hot flashes especially); prescription for effexor sent to pharmacy to start with previous refills sent to pharmacy as well; may need further support for sleep especially pending above. Collaborative care psychiatry an option and referral placed today. Return to clinic with any worsening or changes in symptoms or go to ER Urgent care in off hours.  - venlafaxine (EFFEXOR XR) 37.5 MG 24 hr capsule; Take 1 capsule (37.5 mg) by mouth daily for 14 days, THEN 2 capsules (75 mg) daily for 30 days.  - Adult Mental Health  Referral; Future  - Adult Mental Health  Referral; Future  - hydrOXYzine (VISTARIL) 50 MG capsule; Take 1 capsule (50 mg) by mouth 3 times daily as needed for anxiety  - venlafaxine (EFFEXOR XR) 37.5 MG 24 hr capsule; Take 1 capsule (37.5 mg) by mouth daily for 14 days, THEN 2 capsules (75 mg) daily for 30 days.    Restless leg syndrome  Long standing, chronic, controlled- regular refills sent to pharmacy; only takes as needed but option for more regular use to help with sleep currently encouraged.  - rOPINIRole (REQUIP) 0.25 MG tablet; Take 1 tablet (0.25 mg) by mouth At Bedtime    Review of prior external note(s) from - previous routine notes  20 minutes spent on the date of the encounter doing chart review, history and exam, documentation and further activities per the note       BMI:   Estimated body mass index is 29.69 kg/m  as calculated from the following:    Height as of this encounter: 1.638 m (5' 4.5\").    Weight as of this encounter: 79.7 kg (175 lb 11.2 oz).   Weight management plan: Discussed healthy diet and exercise guidelines    There " "are no Patient Instructions on file for this visit.    Return in about 4 weeks (around 3/28/2023) for Follow up, using virtual (video/phone) visit, or sooner with worsening symptoms.    Hyun Beltran PA-C  [facility]   Maria Esther is a 47 year old presenting for the following health issues:  Follow Up and Refill Request      History of Present Illness       Mental Health Follow-up:  Patient presents to follow-up on Depression & Anxiety.Patient's depression since last visit has been:  Worse  The patient is having other symptoms associated with depression.  Patient's anxiety since last visit has been:  Worse  The patient is not having other symptoms associated with anxiety.  Any significant life events: job concerns and grief or loss  Patient is feeling anxious or having panic attacks.  Patient has no concerns about alcohol or drug use.    She eats 0-1 servings of fruits and vegetables daily.She consumes 2 sweetened beverage(s) daily.She exercises with enough effort to increase her heart rate 9 or less minutes per day.  She exercises with enough effort to increase her heart rate 3 or less days per week. She is missing 4 dose(s) of medications per week.  She is not taking prescribed medications regularly due to remembering to take.    Today's PHQ-9         PHQ-9 Total Score: 12    PHQ-9 Q9 Thoughts of better off dead/self-harm past 2 weeks :   Not at all    How difficult have these problems made it for you to do your work, take care of things at home, or get along with other people: Somewhat difficult  Today's STEVEN-7 Score: 10       Review of Systems   Constitutional, HEENT, cardiovascular, pulmonary, GI, , musculoskeletal, neuro, skin, endocrine and psych systems are negative, except as otherwise noted.      Objective    BP (!) 145/86   Pulse 114   Temp 98.4  F (36.9  C) (Oral)   Resp 20   Ht 1.638 m (5' 4.5\")   Wt 79.7 kg (175 lb 11.2 oz)   LMP 10/06/2022 (Approximate) "   SpO2 97%   BMI 29.69 kg/m    Body mass index is 29.69 kg/m .  Physical Exam   GENERAL: healthy, alert and no distress  RESP: lungs clear to auscultation - no rales, rhonchi or wheezes  CV: regular rate and rhythm, normal S1 S2, no S3 or S4, no murmur, click or rub, no peripheral edema and peripheral pulses strong  MS: no gross musculoskeletal defects noted, no edema  PSYCH: mentation appears normal, affect normal/tearful at times

## 2023-03-20 ENCOUNTER — MYC MEDICAL ADVICE (OUTPATIENT)
Dept: FAMILY MEDICINE | Facility: CLINIC | Age: 48
End: 2023-03-20
Payer: COMMERCIAL

## 2023-03-20 DIAGNOSIS — B85.0 HEAD LICE: ICD-10-CM

## 2023-06-01 ENCOUNTER — HEALTH MAINTENANCE LETTER (OUTPATIENT)
Age: 48
End: 2023-06-01

## 2023-09-14 ENCOUNTER — TELEPHONE (OUTPATIENT)
Dept: BEHAVIORAL HEALTH | Facility: CLINIC | Age: 48
End: 2023-09-14

## 2023-09-14 NOTE — TELEPHONE ENCOUNTER
Pt is a(n) adult (18+ out of HS) Seeking as eval for Adult [place] Assessment required for court..  Appointment scheduled by:  Patient.  (self-pay - complete Cost Estimate)  Legal Guardianship Reviewed?  No  Brief reason for appt:  Pt requesting a [facility]    needed?  NO  Contact information verified/updated: Yes    Doug Godfrey

## 2023-09-29 ENCOUNTER — TELEPHONE (OUTPATIENT)
Dept: BEHAVIORAL HEALTH | Facility: CLINIC | Age: 48
End: 2023-09-29

## 2023-09-29 NOTE — TELEPHONE ENCOUNTER
Pt is a(n) adult (18+ out of HS) Seeking as eval for Adult Mental Health [facility] for evaluation and recommendations..  Appointment scheduled by:  Patient.  (self-pay - complete Cost Estimate)  Caller name:  Maria Esther Haro    Caller phone #: [phone]  Legal Guardianship Reviewed?  No  Honoring Choices Notified?  No  Brief reason for appt:  MH eval     needed?  NO    Contact information verified/updated: Yes    eSlam Robles

## 2023-10-09 ENCOUNTER — PATIENT OUTREACH (OUTPATIENT)
Dept: CARE COORDINATION | Facility: CLINIC | Age: 48
End: 2023-10-09
Payer: COMMERCIAL

## 2023-10-09 ENCOUNTER — HOSPITAL ENCOUNTER (OUTPATIENT)
Dept: BEHAVIORAL HEALTH | Facility: CLINIC | Age: 48
Discharge: HOME | End: 2023-10-09
Attending: FAMILY MEDICINE | Admitting: FAMILY MEDICINE
Payer: COMMERCIAL

## 2023-10-09 DIAGNOSIS — F31.32 BIPOLAR AFFECTIVE DISORDER, CURRENTLY DEPRESSED, MODERATE (H): Primary | ICD-10-CM

## 2023-10-09 DIAGNOSIS — F10.11 ALCOHOL ABUSE, IN REMISSION: ICD-10-CM

## 2023-10-09 PROCEDURE — 90791 PSYCH DIAGNOSTIC EVALUATION: CPT | Performed by: COUNSELOR

## 2023-10-09 ASSESSMENT — COLUMBIA-SUICIDE SEVERITY RATING SCALE - C-SSRS
TOTAL  NUMBER OF INTERRUPTED ATTEMPTS LIFETIME: NO
TOTAL  NUMBER OF ABORTED OR SELF INTERRUPTED ATTEMPTS LIFETIME: NO
6. HAVE YOU EVER DONE ANYTHING, STARTED TO DO ANYTHING, OR PREPARED TO DO ANYTHING TO END YOUR LIFE?: NO
ATTEMPT LIFETIME: NO
2. HAVE YOU ACTUALLY HAD ANY THOUGHTS OF KILLING YOURSELF?: NO
1. HAVE YOU WISHED YOU WERE DEAD OR WISHED YOU COULD GO TO SLEEP AND NOT WAKE UP?: NO

## 2023-10-09 ASSESSMENT — ANXIETY QUESTIONNAIRES
6. BECOMING EASILY ANNOYED OR IRRITABLE: MORE THAN HALF THE DAYS
7. FEELING AFRAID AS IF SOMETHING AWFUL MIGHT HAPPEN: NOT AT ALL
1. FEELING NERVOUS, ANXIOUS, OR ON EDGE: SEVERAL DAYS
GAD7 TOTAL SCORE: 4
2. NOT BEING ABLE TO STOP OR CONTROL WORRYING: NOT AT ALL
3. WORRYING TOO MUCH ABOUT DIFFERENT THINGS: NOT AT ALL
GAD7 TOTAL SCORE: 4
5. BEING SO RESTLESS THAT IT IS HARD TO SIT STILL: NOT AT ALL
IF YOU CHECKED OFF ANY PROBLEMS ON THIS QUESTIONNAIRE, HOW DIFFICULT HAVE THESE PROBLEMS MADE IT FOR YOU TO DO YOUR WORK, TAKE CARE OF THINGS AT HOME, OR GET ALONG WITH OTHER PEOPLE: SOMEWHAT DIFFICULT

## 2023-10-09 ASSESSMENT — PATIENT HEALTH QUESTIONNAIRE - PHQ9
SUM OF ALL RESPONSES TO PHQ QUESTIONS 1-9: 10
5. POOR APPETITE OR OVEREATING: SEVERAL DAYS

## 2023-10-09 NOTE — PROGRESS NOTES
"[facility]      PATIENT'S NAME: Maria Esther Haro  PREFERRED NAME: Maria Esther  PRONOUNS: she/her  MRN: [id number]  : 1975  ADDRESS: [address] [place] [id number]  [facility]:  [id number]  DATE OF SERVICE: 10/09/23  START TIME: 10:01 am  END TIME: 11: 01 am  PREFERRED PHONE: [phone]  May we leave a program related message: Yes  SERVICE MODALITY:  In-person    UNIVERSAL ADULT Mental Health DIAGNOSTIC ASSESSMENT    Identifying Information:  Patient is a 48 year old,    individual.  Patient was referred for an assessment by  legal .  Patient attended the session alone.    Chief Complaint:   The reason for seeking services at this time is: \"court ordered \".   Patient reports she got charged with aiding and abetting and plead guilty to it last year, did two months in [facility] and on probation. This is part of probation.  Patient has attempted to resolve these concerns in the past through medication through pcp, therapy, and substance abuse treatment program .    Social/Family History:  Patient reported they grew up in  [place], [place].  They were raised by biological parents.  Parents stayed . Patient reports she had an older brother but he committed suicide. Patient reported that their childhood was good.  Patient described their current relationships with family of origin as good.      The patient describes their cultural background as Hoahaoism.  Cultural influences and impact on patient's life structure, values, norms, and healthcare: none identified.  Contextual influences on patient's health include: Grief, legal issues, and family dynamics and dealing with her brother's wife. Patient reports brother's death, he lived in [place] and parents live in [place], she has been going back and forth from [place], dealing with his . Patient reports there is some celebrity stuff as she is Devika Ko, and it is stressful " and she is trying to help parents with that.   Cultural, Contextual, and socioeconomic factors do affect the patient's access to services.  These factors will be addressed in the Preliminary Treatment plan.  Patient identified their preferred language to be English. Patient reported they do not  need the assistance of an  or other support involved in therapy.     Patient reported experienced significant delays in developmental tasks, such as patient reports she was diagnosed with ADD. Patient reports she was age 8 when diagnosed .  Patient's highest education level was some college. Patient identified the following learning problems: attention and concentration.  Modifications will not be used to assist communication in therapy.   Patient reports they are  able to understand written materials.    Patient reported the following relationship history as legally  and .  Patient's current relationship status is single.   Patient identified their sexual orientation as heterosexual.  Patient reported having three child(dottie). Patient reports having one minor and two adult children. Patient reports minor is staying with her parents. Patient identified  aunt and friend of hers  as part of their support system.  Patient identified the quality of these relationships as good. Patient reports she sees her children.     Patient's current living/housing situation involves staying in own home/apartment and lives with [facility] and they report that housing is stable.     Patient is currently unemployed.  Patient reports their finances are obtained through  [email or web address] but patient reports she does not have any current bills .  Patient does identify finances as a current stressor.      Patient reported that they have been involved with the legal system. Aiding and abetting and plead guilty to it last year. Patient does report being on probation / parole / under the jurisdiction of the court: :  [place]. County: [facility] . Patient reports she speaks with probation once a month if that. Patient reports retirement time was December to January of 2022-2023.Patient reports probation for two years.     Patient's Strengths and Limitations:  Patient identified the following strengths or resources that will help them succeed in treatment: motivation. Things that may interfere with the patient's success in treatment include: financial hardship.     Personal and Family Medical History:  Patient does report a family history of mental health concerns.  Patient reports family history includes No Known Problems in her father and mother; Skin Cancer in her paternal grandmother. She was adopted.. Patient reports she is adopted but her biological mother struggled with mental health and substance use.Patient reports she had mental issues, patient reports she was heroin addict and she was born addicted to heroin.      Patient does report Mental Health Diagnosis and/or Treatment.  Patient Patient reported the following previous diagnoses which include(s): a Bipolar Disorder, Depression, a Personality Disorder , and substance use disorders .  Patient reported symptoms began when she was young.   Patient has received mental health services in the past:  medication and therapy .  Psychiatric Hospitalizations: None.  Patient denies a history of civil commitment.  Patient is receiving other mental health services.  These include medication by primary care.      Patient has had a physical exam to rule out medical causes for current symptoms.  Date of last physical exam was within the past year. Client was encouraged to follow up with PCP if symptoms were to develop. The patient has a [place] Primary Care Provider, who is named Hyun Beltran. Patient reports the following current medical concerns: see below .  Patient denies any issues with pain..   There are not significant appetite / nutritional concerns / weight  changes. Patient does not report a history of head injury / trauma / cognitive impairment.      Patient reports current meds as:   Current Outpatient Medications   Medication Sig    hydrOXYzine (VISTARIL) 50 MG capsule Take 1 capsule (50 mg) by mouth 3 times daily as needed for anxiety    rOPINIRole (REQUIP) 0.25 MG tablet Take 1 tablet (0.25 mg) by mouth At Bedtime    venlafaxine (EFFEXOR XR) 37.5 MG 24 hr capsule Take 1 capsule (37.5 mg) by mouth daily for 14 days, THEN 2 capsules (75 mg) daily for 30 days.     No current facility-administered medications for this encounter.    Wellbutrin 150mg, no longer on Effexor.     Medication Adherence:  Patient reports taking prescribed medications as prescribed.    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    Depressive disorder 1/1/1999    Severe    Depressive disorder, not elsewhere classified     Migraine     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, in remission (H)     Since May 2001    Other, mixed, or unspecified nondependent drug abuse, in remission     In remission since 2003 (cocaine and ectasy)    Restless leg syndrome      Diagnosis    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    Restless leg syndrome    Mild major depression (H)    Alcohol abuse, in remission    HSV-2 (herpes simplex virus 2) infection    Borderline personality disorder (H)    Bipolar affective disorder, currently depressed, moderate (H)    Substance use disorder    Gamma-hydroxybutyrate (GHB) use disorder, moderate, dependence (H)    Methamphetamine abuse (H)       Current Mental Status Exam:   Appearance:  Appropriate    Eye Contact:  Good   Psychomotor:  Normal       Gait / station:  no problem  Attitude / Demeanor: Cooperative  Friendly  Speech      Rate /  "Production: Normal/ Responsive      Volume:  Normal  volume      Language:  intact  Mood:   Anxious  Sad   Affect:   Appropriate    Thought Content: Clear   Thought Process: Coherent       Associations: No loosening of associations  Insight:   Good   Judgment:  Intact   Orientation:  All  Attention/concentration: Good    Substance Use:  Patient did report a family history of substance use concerns; see medical history section for details.  Patient has received chemical dependency treatment in the past at [facility] [place], [place] 30 day program and completed the program . Patient reports that treatment was for alcohol and cocaine. Patient has not ever been to detox.      Patient is not currently receiving any chemical dependency treatment. Patient reports she is not working with anyone for substance use at this time. Patient reported the following problems as a result of their substance use:   none at this time .    Patient reports alcohol use. \"Little bit lately\". Patient reports, drink a little bit everyday right now, a glass a day, 1-2 shots in the glass a day. Been going on since maybe July of 2023. Patient reports prior was not really drinking. Patient reports her brother's death contributed to use.  Per EHR progress note on 11/7/22:\"Drank alcohol 1-2 shots last week getting off methamphetamine use.  She does not feel she is in danger of abusing alcohol.  She is hopeful that she will be able to permanently discontinue use of methamphetamine\".   Patient reports tobacco use, cigarettes, pack every two weeks.   Patient denies using cannabis.  Patient reports caffeine use. Patient reports daily use, 5-6 pops a day.   Patient reports using/abusing the following substance(s). Patient reported no other substance use.  Patient reports last use for meth was a year and half ago. Patient reports some triggers or cravings. Patient reports drinking to cope with those.     Substance Use: daily use, patient reports she understands it can " "cause problems but it has been rough and she is trying to find other outlets.    Based on the negative CAGE score and clinical interview there  are not indications of drug or alcohol abuse.    Significant Losses / Trauma / Abuse / Neglect Issues:   Patient did not serve in the .  There are indications or report of significant loss, trauma, abuse or neglect issues related to: death of brother by suicide .   Per EHR progress note from Dr. Beltran, PAC, on 2/28/23:\"Grief reaction  Situational anxiety  Long standing, chronic, UNcontrolled- recent family loss with request for therapist, referral placed and options discussed with patient. Willing to start daily medicine to potentially help with acute symptoms but also history of menopausal symptoms (mood changes, sleep issues and hot flashes especially); prescription for effexor sent to pharmacy to start with previous refills sent to pharmacy as well; may need further support for sleep especially pending above. Collaborative care psychiatry an option and referral placed today. Return to clinic with any worsening or changes in symptoms or go to ER Urgent care in off hours.  - venlafaxine (EFFEXOR XR) 37.5 MG 24 hr capsule; Take 1 capsule (37.5 mg) by mouth daily for 14 days, THEN 2 capsules (75 mg) daily for 30 days.  - Adult Mental Health  Referral; Future  - Adult Mental Health  Referral; Future\"  Concerns for possible neglect are not present.     Assessments:  The following assessments were completed by patient for this visit:  PHQ9:       7/15/2019     9:38 AM 9/16/2019    10:57 AM 3/11/2020    11:14 AM 11/7/2022     8:04 AM 2/1/2023    10:54 AM 2/21/2023     7:18 PM 10/9/2023     9:56 AM   PHQ-9 SCORE   PHQ-9 Total Score MyChart 3 (Minimal depression)  15 (Moderately severe depression) 13 (Moderate depression) 4 (Minimal depression) 12 (Moderate depression)    PHQ-9 Total Score 3 7 15 13 4 12 10     GAD7:       12/26/2018     9:40 AM " 1/21/2019    10:44 AM 7/15/2019     9:39 AM 9/16/2019    10:57 AM 3/11/2020    11:15 AM 2/21/2023     7:18 PM 10/9/2023     9:56 AM   STEVEN-7 SCORE   Total Score  10 (moderate anxiety) 4 (minimal anxiety)  11 (moderate anxiety) 10 (moderate anxiety)    Total Score 5 10 4 8 11 10 4     CAGE-AID:       1/21/2019    10:50 AM 10/9/2023     9:56 AM   CAGE-AID Total Score   Total Score 4 1   Total Score MyChart 4 (A total score of 2 or greater is considered clinically significant)      PROMIS 10-Global Health (all questions and answers displayed):       10/9/2023     9:56 AM   PROMIS 10   In general, would you say your health is: 3   In general, would you say your quality of life is: 3   In general, how would you rate your physical health? 3   In general, how would you rate your mental health, including your mood and your ability to think? 2   In general, how would you rate your satisfaction with your social activities and relationships? 2   In general, please rate how well you carry out your usual social activities and roles. (This includes activities at home, at work and in your community, and responsibilities as a parent, child, spouse, employee, friend, etc.) 3   To what extent are you able to carry out your everyday physical activities such as walking, climbing stairs, carrying groceries, or moving a chair? 4   In the past 7 days, how often have you been bothered by emotional problems such as feeling anxious, depressed, or irritable? 4   In the past 7 days, how would you rate your fatigue on average? 3   In the past 7 days, how would you rate your pain on average, where 0 means no pain, and 10 means worst imaginable pain? 1   Global Mental Health Score 9   Global Physical Health Score 14   PROMIS TOTAL - SUBSCORES 23     [place] Suicide Severity Rating Scale (Lifetime/Recent)      3/25/2019    10:16 PM 3/30/2019     5:59 AM 10/9/2023    11:00 AM   [place] Suicide Severity Rating (Lifetime/Recent)   Q1 Wished to be  Dead (Past Month) no no    Q2 Suicidal Thoughts (Past Month) no no    Q6 Suicide Behavior (Lifetime) no no    Q1 Wish to be Dead (Lifetime)   N   Q2 Non-Specific Active Suicidal Thoughts (Lifetime)   N   Actual Attempt (Lifetime)   N   Has subject engaged in non-suicidal self-injurious behavior? (Lifetime)   N   Interrupted Attempts (Lifetime)   N   Aborted or Self-Interrupted Attempt (Lifetime)   N   Preparatory Acts or Behavior (Lifetime)   N   Calculated C-SSRS Risk Score (Lifetime/Recent)   No Risk Indicated   Patient denied past or current SI or past attempts.     Safety Assessment:   Patient denies current homicidal ideation and behaviors.  Patient denies current self-injurious ideation and behaviors.    Patient denied risk behaviors associated with substance use.  Patient reported substance use associated with mental health symptoms.  Patient reports the following current concerns for their personal safety: None.  Patient reports there are not firearms in the house.       History of Safety Concerns:  Patient denied a history of homicidal ideation.     Patient denied a history of personal safety concerns.    Patient denied a history of assaultive behaviors.    Patient denied a history of sexual assault behaviors.     Patient denied a history of risk behaviors associated with substance use.  Patient reported a history of impulsive/compulsive spending behaviors reported a history of substance use associated with mental health symptoms.  Patient reports the following protective factors:stable living environment, taking medication as prescribed and directed, support from family and friends    Risk Plan:  See Recommendations for Safety and Risk Management Plan    Review of Symptoms per patient report:   Depression: Change in sleep, Lack of interest, Excessive or inappropriate guilt, Change in energy level, Difficulties concentrating, Feelings of hopelessness, Low self-worth, Irritability, and Feeling sad, down, or  "depressed, patient reports problems getting to sleep due to restless leg and menopause. Patient reports sometimes having thoughts around stressors and it being hard to fall asleep.Patient reports gaining weight due to menopause.    Bri:  Elevated mood, Irritability, Racing thoughts, Increased activity, Decreased need for sleep, and Impulsiveness, patient reports more bri in the past, where she has \"high highs and the low lows\". Patient reports she is off Topamax but she got back on Wellbutrin. They took her off Topamax which helped but took her off due to her hormones. Patient reports periods of four days being out and shopping and then after for multiple days having low periods.   Psychosis: No Symptoms  Anxiety: Nervousness, Physical complaints, such as headaches, stomachaches, muscle tension, and Anger outbursts, patient reports she gets migraines frequently.   Panic:  No symptoms  Post Traumatic Stress Disorder:  No Symptoms   Eating Disorder: No Symptoms  ADD / ADHD:  No symptoms  Conduct Disorder: No symptoms  Autism Spectrum Disorder: No symptoms  Obsessive Compulsive Disorder: No Symptoms    Patient reports the following compulsive behaviors and treatment history:  none identified .      Diagnostic Criteria:   Adjustment Disorder  A. The development of emotional or behavioral symptoms in response to an identifiable stressor(s) occurring within 3 months of the onset of the stressor(s)  B. These symptoms or behaviors are clinically significant, as evidenced by one or both of the following:       - Marked distress that is out of proportion to the severity/intensity of the stressor (with consideration for external context & culture)  C. The stress-related disturbance does not meet criteria for another disorder & is not not an exacerbation of another mental disorder  D. The symptoms do not represent normal bereavement  E. Once the stressor or its consequences have terminated, the symptoms do not persist for " more than an additional 6 months       * Adjustment Disorder with Mixed Anxiety and Depressed Mood: The predominant manifestation is a combination of depression and anxiety    Functional Status:  Patient reports the following functional impairments:  health maintenance, relationship(s), and self-care.     Nonprogrammatic care:  Patient is requesting basic services to address current mental health concerns.    Clinical Summary:  1. Reason for assessment: court ordered.  2. Psychosocial, Cultural and Contextual Factors: Grief, legal issues, and family dynamics and dealing with her brother's wife.    3. Principal DSM5 Diagnoses  (Sustained by DSM5 Criteria Listed Above):   Adjustment Disorders  309.28 (F43.23) With mixed anxiety and depressed mood.  4. Other Diagnoses that is relevant to services:  Per history, Bipolar Affective Disorder, depressed, moderate, per history, Borderline Personality Disorder, per history, Mild Major Depression, per history, substance use disorder  5. Provisional Diagnosis:    6. Prognosis: Expect Improvement and Relieve Acute Symptoms.  7. Likely consequences of symptoms if not treated: Without treatment patient more than likely will experience a continuation of symptoms with decreased daily functioning, requiring an increased level of care. .  8. Client strengths include:  has a previous history of therapy and motivated .     Recommendations:     1. Plan for Safety and Risk Management:   Safety and Risk: Recommended that patient call 911 or go to the local ED should there be a change in any of these risk factors..          Report to child / adult protection services was NA.     2. Patient's identified elin / Gnosticism / spiritual influences will be incorporated into care by listening to needs and connecting with spiritual service as needed.      3. Initial Treatment will focus on: Depressed Mood - increase coping skills to reduce anxiety and depression. Anxiety - increase coping skills to  reduce anxiety and depression.    Adjustment Difficulties related to: family concerns, unemployment, and grief and loss     4. Resources/Service Plan:    services are not indicated.   Modifications to assist communication are not indicated.   Additional disability accommodations are not indicated.      5. Collaboration:   Collaboration / coordination of treatment will be initiated with the following  support professionals:  none identified .      6.  Referrals:   The following referral(s) will be initiated: Outpatient Mental Jemal Therapy  And follow up on referral placed by Dr. Beltran, HEMALATHA for collaborative care psychiatry . Ideally, working with a therapist that addresses both mental health and substance use.  Continue to follow recommendations of probation. Continue following recommendations of primary care provider and taking medications as prescribed and directed. Next Scheduled Appointment: Adult mental health  referral placed for individual therapy. Behavioral access department to follow up to offer patient individual therapy appointments. Provided patient their contact number as well for follow up.      A Release of Information has been obtained for the following: [facility].     Clinical Substantiation/medical necessity for the above recommendations:  Patient would appear to benefit from working with an individual therapist along with her pcp/psychiatric provider to address her symptoms and process the psychosocial factors that have increased her symptoms. Patient reports she is able to function in her daily task and responsibilities. Patient did not report any safety concerns at this time.    7. AUSTIN:    AUSTIN:  Discussed the general effects of drugs and alcohol on health and well-being. The writer discussed the concerns with patient regarding her alcohol use to cope given her previous history. Patient is open to above recommendations to get help to address her mental health which  should help with positive coping strategies so patient no longer is using alcohol again.     8. Records:   These were reviewed at time of assessment.   Information in this assessment was obtained from the medical record and  provided by patient who is a good historian.    Patient will have open access to their mental health medical record.    9.   Interactive Complexity: No    10. Safety Plan:  Patient denied any current/recent/lifetime history of suicidal ideation and/or behaviors.  No safety plan indicated at this time.     Provider Name/ Credentials:  Michelle Cortez, [facility]   October 9, 2023

## 2023-10-09 NOTE — PATIENT INSTRUCTIONS
Maria Esther,  It was a pleasure meeting with you today. I put in a referral order for [facility] ([facility]) behavioral intake team to call you to offer appointment options for individual therapy. If you would like to call them directly to schedule they should be able to reference my order and their number is [phone]. This is the same number for the collaborative care psychiatry referral HEMALATHA Hudson placed.  I recommend calling the number and requesting a collaborative care psychiatry appointment as HEMALATHA Hudson placed a referral for this that is still active. At that time, you can request a individual therapy appointment as well if you would like since I placed a referral for that today. If there are any issues with these referrals, please let our navigators know, their contact information is below.     [facility] Navigator   Marce Meza  Telephone: [phone]  E-mail: [email or web address]    Navigator [facility]   Telephone: [phone]    RESOURCE  [facility] (free short term therapy)  Website: [email or web address]    Michelle Hernandez, MARIE, LADC Licensed Mental Health Professional Pro Fee Services Psychotherapist   Triage and Transition: Assessment Center  [facility]  [facility]  Gender Pronouns: she/her

## 2023-10-09 NOTE — TELEPHONE ENCOUNTER
Summary of Patient Care Communication Handoff to Patient Navigator Coordinator    PATIENT'S NAME: Maria Esther Haro  PREFERRED PRONOUNS: She/Her/Hers/Herself  MRN:   [id number]  :   1975    DATE OF SERVICE: 10/9/23    Client Population Served: Adult MH    Diagnostic Assessment/Comprehensive Assessment was completed:  Yes      Patient would benefit from a Specialty Care Coordinator Referral for:  No    Release of Information Needed:  No additional release of informations are needed at this time.    Follow up Requests:  Other:  please send my assessment and MIGUEL to [facility]. Listed on MIGUEL.     Michelle Cortez, BARTOLOMEC, LADC

## 2023-10-10 NOTE — TELEPHONE ENCOUNTER
MIGUEL and Assessment was faxed to [facility] on 10/10.    Marce FONSECA  Patient Naviglu

## 2023-10-11 ENCOUNTER — TELEPHONE (OUTPATIENT)
Dept: PSYCHIATRY | Facility: CLINIC | Age: 48
End: 2023-10-11
Payer: COMMERCIAL

## 2023-10-11 NOTE — TELEPHONE ENCOUNTER
PSYCHIATRY CLINIC PHONE INTAKE     SERVICES REQUESTED / INTERESTED IN          Med Management  Individual Psychotherapy     Presenting Problem and Brief History                              What would you like to be seen for? (brief description):  She has depression and recently had a death in family due to SI. She also has manic disorder and borderline personality disorder.  Have you received a mental health diagnosis? Yes   Which one (s): depression  Is there any history of developmental delay?  No   Are you currently seeing a mental health provider?  No            Who / month last seen:  none  Do you have mental health records elsewhere?  No  Will you sign a release so we can obtain them?   Only in [place]    Have you ever been hospitalized for psychiatric reasons?  No  Describe:  none    Do you have current thoughts of self-harm?  No    Do you currently have thoughts of harming others?  No    Do you have any safety concerns? No   If yes to these, offer to reach out to a  for follow up.      Substance Use History     Do you have any history of alcohol / illicit drug use?  Yes  Describe:  both  Have you ever received treatment for this?  Yes    Describe:  in 2009, pt went inpatient for treatment     Social History     Who is the patient's a guardian?  Yes    Name / number: Self  Have you had an ACT team in last 12 months?  No  Describe: None   OK to leave a detailed voicemail?  Yes    Would you be interested in learning more about research opportunities for which you or your child may qualify? We can connect you with a team member for more information.  Yes  If yes, send an [employer] message to Laquita Sanches    Medical/ Surgical History                                   Patient Active Problem List   Diagnosis    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    Restless leg syndrome    Mild major depression (H)    Alcohol abuse, in remission    HSV-2 (herpes simplex virus 2)  infection    Borderline personality disorder (H)    Bipolar affective disorder, currently depressed, moderate (H)    Substance use disorder    Gamma-hydroxybutyrate (GHB) use disorder, moderate, dependence (H)    Methamphetamine abuse (H)          Medications             Have you taken >3 psychiatric medications in your past?  Yes   Do you currently take 5 or more medications, including prescriptions, supplements, and other over the counter products?  No    If YES to at least one of these questions:   As part of your evaluation in our clinic, we have specially trained pharmacists as part of your care team. Your provider would like for you to meet with one of our pharmacists to review your current and past medications, ensure your med list is up to date, and queue up any questions or concerns you have about medications. They will review all of your medications, not just for mental health, to help ensure you know what you re taking and that everything is working together.     Please schedule patient in [facility] PSYCHI[facility]RY (Leslie Arriaga or Hyun Galeana) for 60m MTM in any green space as virtual (video), telephone, or in person (designated in person days per Epic templates).  -Appt notes can say  Psych eval on xx/xx   -Route telephone encounter to the pharmacist who will be seeing the patient.  If patient has questions about insurance coverage or billing, please still schedule the visit and refer them to call the [facility] coordinators at [phone].    Current Outpatient Medications   Medication Sig Dispense Refill    hydrOXYzine (VISTARIL) 50 MG capsule Take 1 capsule (50 mg) by mouth 3 times daily as needed for anxiety 30 capsule 1    rOPINIRole (REQUIP) 0.25 MG tablet Take 1 tablet (0.25 mg) by mouth At Bedtime 30 tablet 2    venlafaxine (EFFEXOR XR) 37.5 MG 24 hr capsule Take 1 capsule (37.5 mg) by mouth daily for 14 days, THEN 2 capsules (75 mg) daily for 30 days. 74 capsule 1         DISPOSITION      10/11/23  Intake phone screen completed. Patient is scheduled for AGE with Clement Montoya on 10/25/23 at 8am in-person and for MTM with Hyun Galeana on 11/01/23 at 12:30pm via telehealth.    Bianka Luu, Complex .

## 2023-10-23 ENCOUNTER — PATIENT OUTREACH (OUTPATIENT)
Dept: CARE COORDINATION | Facility: CLINIC | Age: 48
End: 2023-10-23
Payer: COMMERCIAL

## 2023-10-25 ENCOUNTER — OFFICE VISIT (OUTPATIENT)
Dept: PSYCHIATRY | Facility: CLINIC | Age: 48
End: 2023-10-25
Attending: STUDENT IN AN ORGANIZED HEALTH CARE EDUCATION/TRAINING PROGRAM
Payer: COMMERCIAL

## 2023-10-25 VITALS
WEIGHT: 175.4 LBS | BODY MASS INDEX: 29.64 KG/M2 | DIASTOLIC BLOOD PRESSURE: 83 MMHG | HEART RATE: 105 BPM | SYSTOLIC BLOOD PRESSURE: 120 MMHG

## 2023-10-25 DIAGNOSIS — F31.32 BIPOLAR AFFECTIVE DISORDER, CURRENTLY DEPRESSED, MODERATE (H): Primary | ICD-10-CM

## 2023-10-25 DIAGNOSIS — F43.21 GRIEF REACTION: ICD-10-CM

## 2023-10-25 DIAGNOSIS — F60.3 BORDERLINE PERSONALITY DISORDER (H): ICD-10-CM

## 2023-10-25 PROCEDURE — 90792 PSYCH DIAG EVAL W/MED SRVCS: CPT | Mod: GC

## 2023-10-25 RX ORDER — LAMOTRIGINE 25 MG/1
TABLET ORAL
Qty: 578 TABLET | Refills: 0 | Status: SHIPPED | OUTPATIENT
Start: 2023-10-25 | End: 2023-12-19

## 2023-10-25 RX ORDER — BUPROPION HYDROCHLORIDE 150 MG/1
150 TABLET ORAL EVERY MORNING
COMMUNITY
End: 2024-01-18

## 2023-10-25 ASSESSMENT — PATIENT HEALTH QUESTIONNAIRE - PHQ9
SUM OF ALL RESPONSES TO PHQ QUESTIONS 1-9: 10
SUM OF ALL RESPONSES TO PHQ QUESTIONS 1-9: 10
10. IF YOU CHECKED OFF ANY PROBLEMS, HOW DIFFICULT HAVE THESE PROBLEMS MADE IT FOR YOU TO DO YOUR WORK, TAKE CARE OF THINGS AT HOME, OR GET ALONG WITH OTHER PEOPLE: SOMEWHAT DIFFICULT

## 2023-10-25 ASSESSMENT — PAIN SCALES - GENERAL: PAINLEVEL: NO PAIN (0)
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
## 2023-10-25 NOTE — PATIENT INSTRUCTIONS
Brand Names: US  LaMICtal;     LaMICtal ODT;     LaMICtal Starter;     LaMICtal XR;     Subvenite;     Subvenite Starter Kit-Blue;     Subvenite Starter Kit-Green;     Subvenite Starter Kit-Orange    Brand Names: Veronica  APO-LamoTRIgine;     Auro-LamoTRIgine;     LaMICtal;     LamoTRIgine-100;     LamoTRIgine-150;     LamoTRIgine-25;     MYLAN-LamoTRIgine;     PMS-LamoTRIgine;     TEVA-LamoTRIgine    Warning  All products:  Severe skin reactions may happen with this drug. These include Salas-Antelmo syndrome (SJS), toxic epidermal necrolysis (TEN), and other serious reactions. Sometimes, body organs may also be affected. These reactions can be deadly. Get medical help right away if you have signs like red, swollen, blistered, or peeling skin; red or irritated eyes; sores in your mouth, throat, nose, eyes, genitals, or any areas of skin; fever; chills; body aches; shortness of breath; or swollen glands.  The chance of a skin reaction is raised in children between 2 and 17 years old. It may also be raised if you take valproic acid or divalproex sodium with this drug, if you start taking this drug at too high of a dose, or if your dose is raised too fast. Skin reactions have also happened without any of these. Talk with your doctor.  Most cases of skin reactions have happened within 2 to 8 weeks of starting this drug, but some show up after longer treatment like 6 months. Talk with the doctor.  Extended-release tablets:  This drug is not approved for use in children younger than 13 years old. Talk with the doctor.    What is this drug used for?  It is used to help control certain kinds of seizures.  It is used to treat bipolar disorder.  It may be given to you for other reasons. Talk with the doctor.    What do I need to tell my doctor BEFORE I take this drug?  If you are allergic to this drug; any part of this drug; or any other drugs, foods, or substances. Tell your doctor about the allergy and what signs you  had.  If you are taking dofetilide.  This is not a list of all drugs or health problems that interact with this drug.  Tell your doctor and pharmacist about all of your drugs (prescription or OTC, natural products, vitamins) and health problems. You must check to make sure that it is safe for you to take this drug with all of your drugs and health problems. Do not start, stop, or change the dose of any drug without checking with your doctor.    What are some things I need to know or do while I take this drug?  For all uses of this drug:  Tell all of your health care providers that you take this drug. This includes your doctors, nurses, pharmacists, and dentists.  Avoid driving and doing other tasks or actions that call for you to be alert until you see how this drug affects you.  It may take several weeks to see the full effects.  Have blood work checked as you have been told by the doctor. Talk with the doctor.  This drug may affect certain lab tests. Tell all of your health care providers and lab workers that you take this drug.  Talk with your doctor before you use alcohol, marijuana or other forms of cannabis, or prescription or OTC drugs that may slow your actions.  Like other drugs that may be used for seizures, this drug may rarely raise the risk of suicidal thoughts or actions. The risk may be higher in people who have had suicidal thoughts or actions in the past. Call the doctor right away about any new or worse signs like depression; feeling nervous, restless, or grouchy; panic attacks; or other changes in mood or behavior. Call the doctor right away if any suicidal thoughts or actions occur.  This drug may raise the chance of a severe brain problem called aseptic meningitis. Call your doctor right away if you have a headache, fever, chills, very upset stomach or throwing up, stiff neck, rash, bright lights bother your eyes, feeling sleepy, or feeling confused.  Some drugs may look the same as this drug or  may have names that sound like this drug. Always check to make sure you have the right product. If you see any change in the way this drug looks like shape, color, size, or wording, check with your pharmacist.  If you have an abnormal heartbeat, heart failure, or other heart problems, talk with your doctor. Abnormal heartbeats can happen in people with some types of heart problems, which can lead to sudden death.  If the patient is a child, use this drug with care. The risk of some side effects may be higher in children.  Birth control pills and other hormone-based birth control may change how much of this drug is in your body. Talk to your doctor before you start or stop any hormone-based birth control. The chance of side effects may be raised when taking birth control pills during the week that the pills are not active. Talk with your doctor.  Birth control pills and other hormone-based birth control may not work as well to prevent pregnancy. Use some other kind of birth control also like a condom when taking this drug.  Tell your doctor if you are pregnant, plan on getting pregnant, or are breast-feeding. You will need to talk about the benefits and risks to you and the baby.  For seizures:  If seizures are different or worse after starting this drug, talk with the doctor.    What are some side effects that I need to call my doctor about right away?  WARNING/CAUTION: Even though it may be rare, some people may have very bad and sometimes deadly side effects when taking a drug. Tell your doctor or get medical help right away if you have any of the following signs or symptoms that may be related to a very bad side effect:  Signs of an allergic reaction, like rash; hives; itching; red, swollen, blistered, or peeling skin with or without fever; wheezing; tightness in the chest or throat; trouble breathing, swallowing, or talking; unusual hoarseness; or swelling of the mouth, face, lips, tongue, or throat.  Signs of  kidney problems like unable to pass urine, change in how much urine is passed, blood in the urine, or a big weight gain.  Signs of liver problems like dark urine, tiredness, decreased appetite, upset stomach or stomach pain, light-colored stools, throwing up, or yellow skin or eyes.  Shortness of breath, a big weight gain, or swelling in the arms or legs.  Very bad muscle pain or weakness.  Very bad joint pain or swelling.  Change in eyesight.  Chest pain or pressure.  Fast, slow, or abnormal heartbeat.  Very bad dizziness or passing out.  Change in balance.  Not able to control eye movements.  Flu-like signs.  Painful periods.  Period (menstrual) changes. These include spotting or bleeding between cycles.  Low blood cell counts have happened with this drug. If blood cell counts get very low, this can lead to bleeding problems, infections, or anemia. Call your doctor right away if you have signs of infection like fever, chills, or sore throat; any unexplained bruising or bleeding; or if you feel very tired or weak.  A life-threatening immune system problem called hemophagocytic lymphohistiocytosis (HLH) has happened with this drug. Call your doctor right away if you have fever, swollen gland, rash, seizures, feeling confused or not alert, change in balance, or trouble walking that is new or worse.    What are some other side effects of this drug?  All drugs may cause side effects. However, many people have no side effects or only have minor side effects. Call your doctor or get medical help if any of these side effects or any other side effects bother you or do not go away:  Feeling dizzy, sleepy, tired, or weak.  Constipation, diarrhea, stomach pain, upset stomach, throwing up, or decreased appetite.  Shakiness.  Trouble sleeping.  Nose or throat irritation.  Weight loss.  Dry mouth.  Back pain.  These are not all of the side effects that may occur. If you have questions about side effects, call your doctor. Call  your doctor for medical advice about side effects.  You may report side effects to your national health agency.    How is this drug best taken?  Use this drug as ordered by your doctor. Read all information given to you. Follow all instructions closely.  All products:  Take with or without food.  Keep taking this drug as you have been told by your doctor or other health care provider, even if you feel well.  Do not change the dose or stop this drug. This could cause seizures. Talk with your doctor.  Do not stop taking this drug all of a sudden without calling your doctor. You may have a greater risk of side effects. If you need to stop this drug, you will want to slowly stop it as ordered by your doctor.  If you stop taking this drug, talk with your doctor. You may need to be restarted at a lower dose and raise the dose slowly.  All tablet products:  Swallow whole. Do not chew, break, or crush.  If you have trouble swallowing, talk with your doctor.  Tablets for suspension:  It may be swallowed whole, chewed, or mixed in water or fruit juice.  If chewed, drink a little water or fruit juice to help swallow.  You may break up tablet by adding liquid to cover tablet in a glass or spoon. Wait at least 1 minute until fully broken up, then mix and drink.  Oral-disintegrating tablet:  Place on your tongue and let it dissolve. Water is not needed. Do not swallow it whole. Do not chew, break, or crush it.    What do I do if I miss a dose?  Take a missed dose as soon as you think about it.  If it is close to the time for your next dose, skip the missed dose and go back to your normal time.  Do not take 2 doses at the same time or extra doses.    How do I store and/or throw out this drug?  Store at room temperature protected from light. Store in a dry place. Do not store in a bathroom.  Keep all drugs in a safe place. Keep all drugs out of the reach of children and pets.  Throw away unused or  drugs. Do not flush down a  toilet or pour down a drain unless you are told to do so. Check with your pharmacist if you have questions about the best way to throw out drugs. There may be drug take-back programs in your area.    General drug facts  If your symptoms or health problems do not get better or if they become worse, call your doctor.  Do not share your drugs with others and do not take anyone else's drugs.  Some drugs may have another patient information leaflet. If you have any questions about this drug, please talk with your doctor, nurse, pharmacist, or other health care provider.  If you think there has been an overdose, call your poison control center or get medical care right away. Be ready to tell or show what was taken, how much, and when it happened.        **For crisis resources, please see the information at the end of this document**   Patient Education    Thank you for coming to the [facility] [place] [facility].    Lab Testing:  If you had lab testing today and your results are reassuring or normal they will be mailed to you or sent through [facility] within 7 days. If the lab tests need quick action we will call you with the results. The phone number we will call with results is # [phone] (home) . If this is not the best number please call our clinic and change the number.    Medication Refills:  If you need any refills please call your pharmacy and they will contact us. Our fax number for refills is [phone]. Please allow three business for refill processing. If you need to  your refill at a new pharmacy, please contact the new pharmacy directly. The new pharmacy will help you get your medications transferred.     Scheduling:  If you have any concerns about today's visit or wish to schedule another appointment please call our office during normal business hours [phone] (8-5:00 M-F)    Contact Us:  Please call [phone] during business hours (8-5:00 M-F).  If after  clinic hours, or on the weekend, please call  [phone].    Financial Assistance [phone]  [facility] Billing [phone]  Central Billing Office, [facility]: [phone]  [facility] Billing [phone]  Medical Records [phone]  [facility] Patient Bill of Rights [email or web address]       MENTAL HEALTH CRISIS RESOURCES:  For a emergency help, please call 911 or go to the nearest Emergency Department.     Emergency Walk-In Options:   EmPATH Unit @ [facility] ([place]): [phone] - Specialized mental health emergency area designed to be calming  [facility] ([place]): [phone]  [facility] ([place]): [phone]  [facility]): [phone]    [facility] Crisis Information:   [place]: [phone]  [place]: [phone]  [place] ([place]) - Adult: [phone]     Child: [phone]  [place] - Adult: [phone]     Child: [phone]  [place]: [phone]  List of all [facility] resources:   https://[place].gov/dhs/people-we-serve/adults/health-care/mental-health/resources/crisis-contacts.jsp    National Crisis Information:   Crisis Text Line: Text  [place]  to 230322  National Suicide Prevention Lifeline: 2-790-874-TALK (1-816.200.2692)       For online chat options, visit https://suicidepreventionlifeline.org/chat/  Poison Control Center: 1-807.442.8145  Trans Lifeline: 1-707.780.4620 - Hotline for transgender people of all ages  The [employer]: 1-232.922.2475 - Hotline for LGBT youth     For Non-Emergency Support:   Fast Tracker: Mental Health & Substance Use Disorder Resources -   https://www.[facility].org/       Again thank you for choosing [facility] and please let us know how we can best partner with you to improve you and your family's health.    You may be receiving a survey regarding this appointment. We would  love to have your feedback, both positive and negative. The survey is done by an external company, so your answers are anonymous.

## 2023-11-01 ENCOUNTER — TELEPHONE (OUTPATIENT)
Dept: PHARMACY | Facility: CLINIC | Age: 48
End: 2023-11-01
Payer: COMMERCIAL

## 2023-11-01 NOTE — TELEPHONE ENCOUNTER
Called patient x 2 for MTM appointment, but no answer. Unable to leave voicemail.    Hyun Galeana, PharmD  Medication Therapy Management Pharmacist  [facility]

## 2023-11-06 ENCOUNTER — TELEPHONE (OUTPATIENT)
Dept: PSYCHIATRY | Facility: CLINIC | Age: 48
End: 2023-11-06
Payer: COMMERCIAL

## 2023-11-23 ENCOUNTER — MYC MEDICAL ADVICE (OUTPATIENT)
Dept: FAMILY MEDICINE | Facility: CLINIC | Age: 48
End: 2023-11-23

## 2023-11-23 ENCOUNTER — E-VISIT (OUTPATIENT)
Dept: FAMILY MEDICINE | Facility: CLINIC | Age: 48
End: 2023-11-23
Payer: COMMERCIAL

## 2023-11-23 DIAGNOSIS — N95.1 SYMPTOMATIC MENOPAUSAL OR FEMALE CLIMACTERIC STATES: Primary | ICD-10-CM

## 2023-11-23 DIAGNOSIS — G25.81 RESTLESS LEG SYNDROME: ICD-10-CM

## 2023-11-23 PROCEDURE — 99207 PR NON-BILLABLE SERV PER CHARTING: CPT | Performed by: PHYSICIAN ASSISTANT

## 2023-11-24 RX ORDER — ROPINIROLE 0.25 MG/1
TABLET, FILM COATED ORAL
Qty: 30 TABLET | Refills: 2 | Status: SHIPPED | OUTPATIENT
Start: 2023-11-24 | End: 2023-12-10

## 2023-11-26 ENCOUNTER — MYC REFILL (OUTPATIENT)
Dept: FAMILY MEDICINE | Facility: CLINIC | Age: 48
End: 2023-11-26
Payer: COMMERCIAL

## 2023-11-26 DIAGNOSIS — F41.8 SITUATIONAL ANXIETY: ICD-10-CM

## 2023-11-27 RX ORDER — HYDROXYZINE PAMOATE 50 MG/1
50 CAPSULE ORAL 3 TIMES DAILY PRN
Qty: 30 CAPSULE | Refills: 1 | Status: SHIPPED | OUTPATIENT
Start: 2023-11-27 | End: 2023-12-18

## 2023-12-04 PROBLEM — N89.8 VAGINAL DISCHARGE: Status: ACTIVE | Noted: 2023-12-04

## 2023-12-04 PROBLEM — Z01.419 WELL WOMAN EXAM: Status: ACTIVE | Noted: 2023-12-04

## 2023-12-04 PROBLEM — R30.0 DYSURIA: Status: ACTIVE | Noted: 2023-12-04

## 2023-12-13 ASSESSMENT — ANXIETY QUESTIONNAIRES
1. FEELING NERVOUS, ANXIOUS, OR ON EDGE: NOT AT ALL
3. WORRYING TOO MUCH ABOUT DIFFERENT THINGS: NOT AT ALL
5. BEING SO RESTLESS THAT IT IS HARD TO SIT STILL: SEVERAL DAYS
GAD7 TOTAL SCORE: 1
6. BECOMING EASILY ANNOYED OR IRRITABLE: NOT AT ALL
GAD7 TOTAL SCORE: 1
7. FEELING AFRAID AS IF SOMETHING AWFUL MIGHT HAPPEN: NOT AT ALL
IF YOU CHECKED OFF ANY PROBLEMS ON THIS QUESTIONNAIRE, HOW DIFFICULT HAVE THESE PROBLEMS MADE IT FOR YOU TO DO YOUR WORK, TAKE CARE OF THINGS AT HOME, OR GET ALONG WITH OTHER PEOPLE: NOT DIFFICULT AT ALL
4. TROUBLE RELAXING: NOT AT ALL
2. NOT BEING ABLE TO STOP OR CONTROL WORRYING: NOT AT ALL

## 2023-12-14 ENCOUNTER — OFFICE VISIT (OUTPATIENT)
Dept: OBGYN | Facility: CLINIC | Age: 48
End: 2023-12-14
Attending: NURSE PRACTITIONER
Payer: COMMERCIAL

## 2023-12-14 ENCOUNTER — LAB (OUTPATIENT)
Dept: LAB | Facility: CLINIC | Age: 48
End: 2023-12-14
Attending: NURSE PRACTITIONER
Payer: COMMERCIAL

## 2023-12-14 VITALS
HEART RATE: 93 BPM | WEIGHT: 178 LBS | DIASTOLIC BLOOD PRESSURE: 71 MMHG | HEIGHT: 65 IN | BODY MASS INDEX: 29.66 KG/M2 | SYSTOLIC BLOOD PRESSURE: 110 MMHG

## 2023-12-14 DIAGNOSIS — R30.0 DYSURIA: ICD-10-CM

## 2023-12-14 DIAGNOSIS — Z12.31 ENCOUNTER FOR SCREENING MAMMOGRAM FOR MALIGNANT NEOPLASM OF BREAST: ICD-10-CM

## 2023-12-14 DIAGNOSIS — Z13.29 SCREENING FOR THYROID DISORDER: ICD-10-CM

## 2023-12-14 DIAGNOSIS — R31.9 HEMATURIA, UNSPECIFIED TYPE: ICD-10-CM

## 2023-12-14 DIAGNOSIS — Z13.220 SCREENING FOR LIPID DISORDERS: ICD-10-CM

## 2023-12-14 DIAGNOSIS — B96.89 BACTERIAL VAGINOSIS: ICD-10-CM

## 2023-12-14 DIAGNOSIS — N76.0 BACTERIAL VAGINOSIS: ICD-10-CM

## 2023-12-14 DIAGNOSIS — B37.31 VULVOVAGINAL CANDIDIASIS: Primary | ICD-10-CM

## 2023-12-14 DIAGNOSIS — N95.1 MENOPAUSAL SYNDROME (HOT FLASHES): Primary | ICD-10-CM

## 2023-12-14 DIAGNOSIS — N95.1 MENOPAUSAL SYNDROME (HOT FLASHES): ICD-10-CM

## 2023-12-14 DIAGNOSIS — N89.8 VAGINAL ODOR: ICD-10-CM

## 2023-12-14 DIAGNOSIS — N95.8 GENITOURINARY SYNDROME OF MENOPAUSE: ICD-10-CM

## 2023-12-14 LAB
ALBUMIN SERPL BCG-MCNC: 4.1 G/DL (ref 3.5–5.2)
ALBUMIN UR-MCNC: NEGATIVE MG/DL
ALP SERPL-CCNC: 118 U/L (ref 40–150)
ALT SERPL W P-5'-P-CCNC: 14 U/L (ref 0–50)
ANION GAP SERPL CALCULATED.3IONS-SCNC: 8 MMOL/L (ref 7–15)
APPEARANCE UR: CLEAR
AST SERPL W P-5'-P-CCNC: 12 U/L (ref 0–45)
BACTERIAL VAGINOSIS VAG-IMP: POSITIVE
BILIRUB SERPL-MCNC: 0.4 MG/DL
BILIRUB UR QL STRIP: NEGATIVE
BUN SERPL-MCNC: 9.2 MG/DL (ref 6–20)
CALCIUM SERPL-MCNC: 9.3 MG/DL (ref 8.6–10)
CANDIDA DNA VAG QL NAA+PROBE: DETECTED
CANDIDA GLABRATA / CANDIDA KRUSEI DNA: DETECTED
CHLORIDE SERPL-SCNC: 102 MMOL/L (ref 98–107)
CHOLEST SERPL-MCNC: 188 MG/DL
COLOR UR AUTO: YELLOW
CREAT SERPL-MCNC: 0.81 MG/DL (ref 0.51–0.95)
DEPRECATED HCO3 PLAS-SCNC: 28 MMOL/L (ref 22–29)
EGFRCR SERPLBLD CKD-EPI 2021: 90 ML/MIN/1.73M2
FASTING STATUS PATIENT QL REPORTED: NO
GLUCOSE SERPL-MCNC: 88 MG/DL (ref 70–99)
GLUCOSE UR STRIP-MCNC: NEGATIVE MG/DL
HDLC SERPL-MCNC: 68 MG/DL
HGB UR QL STRIP: ABNORMAL
KETONES UR STRIP-MCNC: NEGATIVE MG/DL
LDLC SERPL CALC-MCNC: 81 MG/DL
LEUKOCYTE ESTERASE UR QL STRIP: NEGATIVE
NITRATE UR QL: NEGATIVE
NONHDLC SERPL-MCNC: 120 MG/DL
PH UR STRIP: 6 [PH] (ref 5–8)
POTASSIUM SERPL-SCNC: 3.7 MMOL/L (ref 3.4–5.3)
PROT SERPL-MCNC: 7 G/DL (ref 6.4–8.3)
SODIUM SERPL-SCNC: 138 MMOL/L (ref 135–145)
SP GR UR STRIP: 1.01 (ref 1–1.03)
T VAGINALIS DNA VAG QL NAA+PROBE: NOT DETECTED
TRIGL SERPL-MCNC: 193 MG/DL
TSH SERPL DL<=0.005 MIU/L-ACNC: 0.92 UIU/ML (ref 0.3–4.2)
UROBILINOGEN UR STRIP-ACNC: 0.2 E.U./DL

## 2023-12-14 PROCEDURE — 82310 ASSAY OF CALCIUM: CPT

## 2023-12-14 PROCEDURE — 83718 ASSAY OF LIPOPROTEIN: CPT

## 2023-12-14 PROCEDURE — 87086 URINE CULTURE/COLONY COUNT: CPT | Performed by: NURSE PRACTITIONER

## 2023-12-14 PROCEDURE — 81003 URINALYSIS AUTO W/O SCOPE: CPT | Performed by: NURSE PRACTITIONER

## 2023-12-14 PROCEDURE — G0463 HOSPITAL OUTPT CLINIC VISIT: HCPCS | Performed by: NURSE PRACTITIONER

## 2023-12-14 PROCEDURE — 99204 OFFICE O/P NEW MOD 45 MIN: CPT | Performed by: NURSE PRACTITIONER

## 2023-12-14 PROCEDURE — 36415 COLL VENOUS BLD VENIPUNCTURE: CPT

## 2023-12-14 PROCEDURE — 84443 ASSAY THYROID STIM HORMONE: CPT

## 2023-12-14 PROCEDURE — 0352U MULTIPLEX VAGINAL PANEL BY PCR: CPT | Performed by: NURSE PRACTITIONER

## 2023-12-14 RX ORDER — ESTRADIOL 0.05 MG/D
1 PATCH, EXTENDED RELEASE TRANSDERMAL
Qty: 24 PATCH | Refills: 0 | Status: SHIPPED | OUTPATIENT
Start: 2023-12-14 | End: 2024-08-26

## 2023-12-14 RX ORDER — METRONIDAZOLE 500 MG/1
500 TABLET ORAL 2 TIMES DAILY
Qty: 14 TABLET | Refills: 0 | Status: SHIPPED | OUTPATIENT
Start: 2023-12-14 | End: 2023-12-21

## 2023-12-14 RX ORDER — PROGESTERONE 100 MG/1
100 CAPSULE ORAL DAILY
Qty: 90 CAPSULE | Refills: 1 | Status: SHIPPED | OUTPATIENT
Start: 2023-12-14 | End: 2024-08-26

## 2023-12-14 RX ORDER — TERCONAZOLE 0.4 %
CREAM WITH APPLICATOR VAGINAL
Qty: 35 G | Refills: 0 | Status: SHIPPED | OUTPATIENT
Start: 2023-12-14 | End: 2024-02-15

## 2023-12-14 RX ORDER — ESTRADIOL 10 UG/1
INSERT VAGINAL
Qty: 24 TABLET | Refills: 3 | Status: SHIPPED | OUTPATIENT
Start: 2023-12-14 | End: 2024-08-26

## 2023-12-14 ASSESSMENT — PAIN SCALES - GENERAL: PAINLEVEL: NO PAIN (0)

## 2023-12-14 NOTE — LETTER
"2023       RE: Maria Esther Haro  [address] [facility] [address]     Dear Colleague,    Thank you for referring your patient, Maria Esther Haro, to the [facility] [place] [facility]. Please see a copy of my visit note below.    Subjective:  Maria Esther Haro is an 48 year old, , who requests an evaluation of     Urinary symptoms: Dysuria, urinary frequency, and urgency; denies hematuria, incontinence, and flank pain. Last UTI was 2022.  Feels these symptoms are new for her.    Vaginal odor present, thinks it is BV. Some itching. Last year had BV as well. No change in discharge amount or consistency.    Perimenopausal symptoms: Last period was 9 months ago. No bleeding since that time. Has been experiencing hot flashes, night sweats, trouble losing weight, itching scalp and skin (has seen derm without an identified cause), trouble sleeping (due to itching and night sweats), vaginal dryness, and brain fog.  Did have some joint pain, which has now resolved. Has not tried anything for the menopause symptoms.     Sexually active infrequently due to low libido; using condoms for contraception. Denies dyspareunia. Uses lubricant. Declines STD testing today    Social hx: vapes 2 times per day. No cigarette smoking.     Mammogram: last in     Last pap smear: 2019 NIL, HPV negative    \"A little\" alcohol use since Feb, death of her brother by suicide.     Last cholesterol was in .     Answers submitted by the patient for this visit:  STEVEN-7 (Submitted on 2023)  STEVEN 7 TOTAL SCORE: 1    Medications  Current Outpatient Medications   Medication    buPROPion (WELLBUTRIN XL) 150 MG 24 hr tablet    hydrOXYzine (VISTARIL) 50 MG capsule    lamoTRIgine (LAMICTAL) 25 MG tablet    rOPINIRole (REQUIP) 0.25 MG tablet    rOPINIRole (REQUIP) 0.25 MG tablet     No current facility-administered medications for this visit.     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, in remission (H)     Since May 2001    Other, mixed, or unspecified nondependent drug abuse, in remission     In remission since  (cocaine and ectasy)    Restless leg syndrome      Past Surgical History:   Procedure Laterality Date    ABDOMEN SURGERY  2010    Csection    COSMETIC SURGERY  2007    Breast surgery    Z ANESTH,SURG BREAST RECONSTRUCTIVE      Breast augmentation    [facility]SPECIFIC PROCEDURE   &      x 3, previous tear in cervix     Family History   Adopted: Yes   Problem Relation Age of Onset    No Known Problems Mother     No Known Problems Father     Skin Cancer Paternal Grandmother     Unknown/Adopted No family hx of         Pt is adopted    Macular Degeneration No family hx of     Glaucoma No family hx of     Diabetes No family hx of     Hypertension No family hx of       No Known Allergies    Obstetric History  OB History    Para Term  AB Living   3 3 3 0 0 3   SAB IAB Ectopic Multiple Live Births   0 0 0 0 3      # Outcome Date GA Lbr [place]/2nd Weight Sex Delivery Anes PTL Lv   3 Term 99 39w0d  3.118 kg (6 lb 14 oz) M CS-Unspec Spinal  BRAD      Birth Comments: 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 had an outbreak of HSV, scheduled       Name: Tamiko Hernandez Term         BRAD        Labs:  TSH   Date Value Ref Range Status   2021 1.16 0.40 - 4.00 mU/L Final     Lab Results   Component Value Date    CHOL 228 06/15/2021     Lab Results   Component Value Date    HDL 74 06/15/2021     Lab  "Results   Component Value Date     06/15/2021     Lab Results   Component Value Date    TRIG 106 06/15/2021     Lab Results   Component Value Date    CHOLHDLRATIO 3.4 2009     Review of Systems:   ROS: 10 point ROS neg other than the symptoms noted above in the HPI.    Objective  EXAM:  Blood pressure 110/71, pulse 93, height 1.657 m (5' 5.25\"), weight 80.7 kg (178 lb), not currently breastfeeding. Body mass index is 29.39 kg/m .  General - pleasant female in no acute distress.  EENT-  euthyroid without palpable nodules  Neck - supple without lymphadenopathy.  Lungs - clear to auscultation bilaterally.  Heart - regular rate and rhythm without murmur.  Musculoskeletal - no gross deformities.  Neurological - normal strength, sensation, and mental status.    Pelvic Exam: deferred by patient    ASSESSMENT:  Maria Esther Haro is an 48 year old, , who requests an evaluation of marci/menopause symptoms.    1. Vaginal odor  - Multiplex Vaginal Panel by PCR  - pt declines STD testing    2. Screening for lipid disorders  - Lipid profile - ordered so that ASCVD risk can be calculated as part of risk / benefit discussion for hormone therapy    3. Encounter for screening mammogram for malignant neoplasm of breast  - MA Screening Digital Bilateral; Future    4. Hematuria, unspecified type  5. Dysuria  - Urine Culture Aerobic Bacterial    6. Screening for thyroid disorder  - TSH with free T4 reflex; Future    7. Menopausal syndrome (hot flashes)  8. Genitourinary syndrome of menopause  - MA Screening Digital Bilateral; Future - pt to schedule   - Lipid Profile; Future  - Comprehensive metabolic panel; Future  - estradiol (VIVELLE-DOT) 0.05 MG/24HR bi-weekly patch; Place 1 patch onto the skin twice a week  Dispense: 24 patch; Refill: 0  - progesterone (PROMETRIUM) 100 MG capsule; Take 1 capsule (100 mg) by mouth daily  Dispense: 90 capsule; Refill: 1  - TSH with free T4 reflex; Future  - estradiol (VAGIFEM) 10 MCG " TABS vaginal tablet; Insert 1 tablet into the vagina nightly for 1 week, then decrease frequency of use to twice per week, thereafter.  Dispense: 24 tablet; Refill: 3      - Reviewed risk and benefits of initiating systemic HT for bothersome VMS. ASCVD score was calculated and determined to be Low (1.4%) based on lipid profile and VS today.    - Discussed risk versus benefit with patient in initiating systemic HT if age of 60 years or 10 years since LMP.    - Patient does not have any absolute contraindications and requests to initiate treatment today using: transdermal estradiol patch including nightly oral progesterone. Discussed medication regimen with patient utilizing continuous progesterone.   - Acupuncture, yoga, exercise, chiropractic work, and paced respirations may help to cope with VMS but have not shown evidence to alleviate them.    - Reviewed use of vaginal lubrication for GSM.  - Patient would like to utilize vaginal Estrogen insert for atrophy/dryness. She declined pelvic exam today; would recommend this be completed if inadequate relief of vaginal symptoms with estrogen.     Pt will be notified by mychart of vaginal panel and urine culture results.   Return to clinic in 4-6.  Follow-up as needed.  ZACK Bailey CNP

## 2023-12-14 NOTE — PROGRESS NOTES
"Subjective:  Maria Esther Haro is an 48 year old, , who requests an evaluation of     Urinary symptoms: Dysuria, urinary frequency, and urgency; denies hematuria, incontinence, and flank pain. Last UTI was 2022.  Feels these symptoms are new for her.    Vaginal odor present, thinks it is BV. Some itching. Last year had BV as well. No change in discharge amount or consistency.    Perimenopausal symptoms: Last period was 9 months ago. No bleeding since that time. Has been experiencing hot flashes, night sweats, trouble losing weight, itching scalp and skin (has seen derm without an identified cause), trouble sleeping (due to itching and night sweats), vaginal dryness, and brain fog.  Did have some joint pain, which has now resolved. Has not tried anything for the menopause symptoms.     Sexually active infrequently due to low libido; using condoms for contraception. Denies dyspareunia. Uses lubricant. Declines STD testing today    Social hx: vapes 2 times per day. No cigarette smoking.     Mammogram: last in     Last pap smear:  NIL, HPV negative    \"A little\" alcohol use since Feb, death of her brother by suicide.     Last cholesterol was in .     Answers submitted by the patient for this visit:  STEVEN-7 (Submitted on 2023)  STEVEN 7 TOTAL SCORE: 1    Medications  Current Outpatient Medications   Medication    buPROPion (WELLBUTRIN XL) 150 MG 24 hr tablet    hydrOXYzine (VISTARIL) 50 MG capsule    lamoTRIgine (LAMICTAL) 25 MG tablet    rOPINIRole (REQUIP) 0.25 MG tablet    rOPINIRole (REQUIP) 0.25 MG tablet     No current facility-administered medications for this visit.     Past Medical History:   Diagnosis Date    Alcohol abuse, in remission     Remission since     ASCUS on Pap smear 11    HPV-pos. unable to assign carcinogenicity    Depressive disorder 1999    Severe    Depressive disorder, not elsewhere classified     Migraine     Nondependent amphetamine or related acting " sympathomimetic abuse, in remission (H)     Since May 2001    Other, mixed, or unspecified nondependent drug abuse, in remission     In remission since  (cocaine and ectasy)    Restless leg syndrome      Past Surgical History:   Procedure Laterality Date    ABDOMEN SURGERY  2010    Csection    COSMETIC SURGERY  2007    Breast surgery    Z ANESTH,SURG BREAST RECONSTRUCTIVE      Breast augmentation    [facility] NONSPECIFIC PROCEDURE   &      x 3, previous tear in cervix     Family History   Adopted: Yes   Problem Relation Age of Onset    No Known Problems Mother     No Known Problems Father     Skin Cancer Paternal Grandmother     Unknown/Adopted No family hx of         Pt is adopted    Macular Degeneration No family hx of     Glaucoma No family hx of     Diabetes No family hx of     Hypertension No family hx of       No Known Allergies    Obstetric History  OB History    Para Term  AB Living   3 3 3 0 0 3   SAB IAB Ectopic Multiple Live Births   0 0 0 0 3      # Outcome Date GA Lbr Lyndon/2nd Weight Sex Delivery Anes PTL Lv   3 Term 99 39w0d  3.118 kg (6 lb 14 oz) M CS-Unspec Spinal  BRAD      Birth Comments: 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 had an outbreak of HSV, scheduled       Name: Tamiko Hernandez Term         BRAD        Labs:  TSH   Date Value Ref Range Status   2021 1.16 0.40 - 4.00 mU/L Final     Lab Results   Component Value Date    CHOL 228 06/15/2021     Lab Results   Component Value Date    HDL 74 06/15/2021     Lab Results   Component Value Date     06/15/2021     Lab Results   Component Value Date    TRIG 106 06/15/2021     Lab Results   Component Value Date    CHOLHDLRATIO 3.4 2009     Review of Systems:   ROS: 10 point ROS neg other than the symptoms noted above in the HPI.    Objective  EXAM:  Blood pressure  "110/71, pulse 93, height 1.657 m (5' 5.25\"), weight 80.7 kg (178 lb), not currently breastfeeding. Body mass index is 29.39 kg/m .  General - pleasant female in no acute distress.  EENT-  euthyroid without palpable nodules  Neck - supple without lymphadenopathy.  Lungs - clear to auscultation bilaterally.  Heart - regular rate and rhythm without murmur.  Musculoskeletal - no gross deformities.  Neurological - normal strength, sensation, and mental status.    Pelvic Exam: deferred by patient    ASSESSMENT:  Maria Esther Haro is an 48 year old, , who requests an evaluation of marci/menopause symptoms.    1. Vaginal odor  - Multiplex Vaginal Panel by PCR  - pt declines STD testing    2. Screening for lipid disorders  - Lipid profile - ordered so that ASCVD risk can be calculated as part of risk / benefit discussion for hormone therapy    3. Encounter for screening mammogram for malignant neoplasm of breast  - MA Screening Digital Bilateral; Future    4. Hematuria, unspecified type  5. Dysuria  - Urine Culture Aerobic Bacterial    6. Screening for thyroid disorder  - TSH with free T4 reflex; Future    7. Menopausal syndrome (hot flashes)  8. Genitourinary syndrome of menopause  - MA Screening Digital Bilateral; Future - pt to schedule   - Lipid Profile; Future  - Comprehensive metabolic panel; Future  - estradiol (VIVELLE-DOT) 0.05 MG/24HR bi-weekly patch; Place 1 patch onto the skin twice a week  Dispense: 24 patch; Refill: 0  - progesterone (PROMETRIUM) 100 MG capsule; Take 1 capsule (100 mg) by mouth daily  Dispense: 90 capsule; Refill: 1  - TSH with free T4 reflex; Future  - estradiol (VAGIFEM) 10 MCG TABS vaginal tablet; Insert 1 tablet into the vagina nightly for 1 week, then decrease frequency of use to twice per week, thereafter.  Dispense: 24 tablet; Refill: 3      - Reviewed risk and benefits of initiating systemic HT for bothersome VMS. ASCVD score was calculated and determined to be Low (1.4%) based on " lipid profile and VS today.    - Discussed risk versus benefit with patient in initiating systemic HT if age of 62 years or 10 years since LMP.    - Patient does not have any absolute contraindications and requests to initiate treatment today using: transdermal estradiol patch including nightly oral progesterone. Discussed medication regimen with patient utilizing continuous progesterone.   - Acupuncture, yoga, exercise, chiropractic work, and paced respirations may help to cope with VMS but have not shown evidence to alleviate them.    - Reviewed use of vaginal lubrication for GSM.  - Patient would like to utilize vaginal Estrogen insert for atrophy/dryness. She declined pelvic exam today; would recommend this be completed if inadequate relief of vaginal symptoms with estrogen.     Pt will be notified by mychart of vaginal panel and urine culture results.   Return to clinic in 4-6.  Follow-up as needed.  ZACK Bailey CNP

## 2023-12-15 LAB — BACTERIA UR CULT: NORMAL

## 2023-12-17 ENCOUNTER — MYC REFILL (OUTPATIENT)
Dept: OBGYN | Facility: CLINIC | Age: 48
End: 2023-12-17
Payer: COMMERCIAL

## 2023-12-17 DIAGNOSIS — B37.31 VULVOVAGINAL CANDIDIASIS: ICD-10-CM

## 2023-12-17 DIAGNOSIS — B96.89 BACTERIAL VAGINOSIS: ICD-10-CM

## 2023-12-17 DIAGNOSIS — N76.0 BACTERIAL VAGINOSIS: ICD-10-CM

## 2023-12-17 RX ORDER — TERCONAZOLE 0.4 %
CREAM WITH APPLICATOR VAGINAL
Qty: 35 G | Refills: 0 | Status: CANCELLED | OUTPATIENT
Start: 2023-12-17

## 2023-12-17 RX ORDER — METRONIDAZOLE 500 MG/1
500 TABLET ORAL 2 TIMES DAILY
Qty: 14 TABLET | Refills: 0 | Status: CANCELLED | OUTPATIENT
Start: 2023-12-17

## 2023-12-18 ENCOUNTER — MYC MEDICAL ADVICE (OUTPATIENT)
Dept: OBGYN | Facility: CLINIC | Age: 48
End: 2023-12-18
Payer: COMMERCIAL

## 2023-12-18 RX ORDER — METRONIDAZOLE 500 MG/1
500 TABLET ORAL 2 TIMES DAILY
Qty: 14 TABLET | Refills: 0 | Status: SHIPPED | OUTPATIENT
Start: 2023-12-18 | End: 2023-12-25

## 2023-12-18 RX ORDER — TERCONAZOLE 0.4 %
1 CREAM WITH APPLICATOR VAGINAL AT BEDTIME
Qty: 35 G | Refills: 0 | Status: SHIPPED | OUTPATIENT
Start: 2023-12-18 | End: 2024-02-15

## 2023-12-18 NOTE — TELEPHONE ENCOUNTER
Attempted to call pt to let her know that her medication was ordered to the incorrect pharmacy, no answer. Reordered medications.

## 2023-12-19 ENCOUNTER — MYC REFILL (OUTPATIENT)
Dept: OBGYN | Facility: CLINIC | Age: 48
End: 2023-12-19
Payer: COMMERCIAL

## 2023-12-19 ENCOUNTER — MYC MEDICAL ADVICE (OUTPATIENT)
Dept: OBGYN | Facility: CLINIC | Age: 48
End: 2023-12-19
Payer: COMMERCIAL

## 2023-12-19 DIAGNOSIS — B37.31 VULVOVAGINAL CANDIDIASIS: Primary | ICD-10-CM

## 2023-12-19 DIAGNOSIS — B37.31 VULVOVAGINAL CANDIDIASIS: ICD-10-CM

## 2023-12-19 RX ORDER — MICONAZOLE NITRATE 2 %
1 CREAM WITH APPLICATOR VAGINAL AT BEDTIME
Qty: 45 G | Refills: 0 | Status: SHIPPED | OUTPATIENT
Start: 2023-12-19 | End: 2023-12-21

## 2023-12-19 RX ORDER — TERCONAZOLE 0.4 %
1 CREAM WITH APPLICATOR VAGINAL AT BEDTIME
Qty: 35 G | Refills: 0 | Status: CANCELLED | OUTPATIENT
Start: 2023-12-19

## 2023-12-19 NOTE — TELEPHONE ENCOUNTER
Called pt to let her know that OTC 7-day monistat treatment should work for her yeast infection. Pt verbalized understanding and will  kit ASAP.

## 2023-12-21 RX ORDER — MICONAZOLE NITRATE 2 %
1 CREAM WITH APPLICATOR VAGINAL AT BEDTIME
Qty: 45 G | Refills: 0 | Status: SHIPPED | OUTPATIENT
Start: 2023-12-21 | End: 2024-08-05

## 2024-01-13 ENCOUNTER — HEALTH MAINTENANCE LETTER (OUTPATIENT)
Age: 49
End: 2024-01-13

## 2024-01-15 DIAGNOSIS — F31.32 BIPOLAR AFFECTIVE DISORDER, CURRENTLY DEPRESSED, MODERATE (H): ICD-10-CM

## 2024-01-18 ENCOUNTER — OFFICE VISIT (OUTPATIENT)
Dept: PSYCHIATRY | Facility: CLINIC | Age: 49
End: 2024-01-18
Attending: STUDENT IN AN ORGANIZED HEALTH CARE EDUCATION/TRAINING PROGRAM
Payer: COMMERCIAL

## 2024-01-18 ENCOUNTER — MYC MEDICAL ADVICE (OUTPATIENT)
Dept: FAMILY MEDICINE | Facility: CLINIC | Age: 49
End: 2024-01-18
Payer: COMMERCIAL

## 2024-01-18 ENCOUNTER — MYC REFILL (OUTPATIENT)
Dept: PSYCHIATRY | Facility: CLINIC | Age: 49
End: 2024-01-18
Payer: COMMERCIAL

## 2024-01-18 ENCOUNTER — MYC REFILL (OUTPATIENT)
Dept: FAMILY MEDICINE | Facility: CLINIC | Age: 49
End: 2024-01-18

## 2024-01-18 VITALS
WEIGHT: 177 LBS | SYSTOLIC BLOOD PRESSURE: 150 MMHG | DIASTOLIC BLOOD PRESSURE: 73 MMHG | HEART RATE: 96 BPM | BODY MASS INDEX: 29.23 KG/M2

## 2024-01-18 DIAGNOSIS — G25.81 RESTLESS LEG SYNDROME: ICD-10-CM

## 2024-01-18 DIAGNOSIS — F31.32 BIPOLAR AFFECTIVE DISORDER, CURRENTLY DEPRESSED, MODERATE (H): ICD-10-CM

## 2024-01-18 DIAGNOSIS — F41.8 SITUATIONAL ANXIETY: ICD-10-CM

## 2024-01-18 DIAGNOSIS — F33.0 MILD EPISODE OF RECURRENT MAJOR DEPRESSIVE DISORDER (H): Primary | ICD-10-CM

## 2024-01-18 DIAGNOSIS — F60.3 BORDERLINE PERSONALITY DISORDER (H): Primary | ICD-10-CM

## 2024-01-18 PROCEDURE — G0463 HOSPITAL OUTPT CLINIC VISIT: HCPCS

## 2024-01-18 PROCEDURE — 99214 OFFICE O/P EST MOD 30 MIN: CPT | Mod: GC

## 2024-01-18 RX ORDER — HYDROXYZINE PAMOATE 50 MG/1
50 CAPSULE ORAL 3 TIMES DAILY PRN
Qty: 30 CAPSULE | Refills: 1 | Status: SHIPPED | OUTPATIENT
Start: 2024-01-18 | End: 2024-02-15

## 2024-01-18 RX ORDER — LAMOTRIGINE 100 MG/1
300 TABLET ORAL DAILY
Qty: 90 TABLET | Refills: 1 | Status: SHIPPED | OUTPATIENT
Start: 2024-01-18 | End: 2024-06-17

## 2024-01-18 RX ORDER — LAMOTRIGINE 100 MG/1
300 TABLET ORAL DAILY
Qty: 90 TABLET | Refills: 1 | Status: CANCELLED | OUTPATIENT
Start: 2024-01-18

## 2024-01-18 RX ORDER — BUPROPION HYDROCHLORIDE 150 MG/1
150 TABLET ORAL EVERY MORNING
Qty: 90 TABLET | Refills: 0 | Status: SHIPPED | OUTPATIENT
Start: 2024-01-18 | End: 2024-02-15

## 2024-01-18 RX ORDER — ROPINIROLE 0.25 MG/1
.25-.5 TABLET, FILM COATED ORAL 2 TIMES DAILY PRN
Qty: 90 TABLET | Refills: 0 | Status: SHIPPED | OUTPATIENT
Start: 2024-01-18 | End: 2024-02-15

## 2024-01-18 ASSESSMENT — PATIENT HEALTH QUESTIONNAIRE - PHQ9
SUM OF ALL RESPONSES TO PHQ QUESTIONS 1-9: 2
10. IF YOU CHECKED OFF ANY PROBLEMS, HOW DIFFICULT HAVE THESE PROBLEMS MADE IT FOR YOU TO DO YOUR WORK, TAKE CARE OF THINGS AT HOME, OR GET ALONG WITH OTHER PEOPLE: SOMEWHAT DIFFICULT
SUM OF ALL RESPONSES TO PHQ QUESTIONS 1-9: 2

## 2024-01-18 ASSESSMENT — PAIN SCALES - GENERAL: PAINLEVEL: NO PAIN (0)
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
## 2024-01-18 NOTE — PATIENT INSTRUCTIONS
**For crisis resources, please see the information at the end of this document**   Patient Education    Thank you for coming to the [facility] [place] [facility].    Lab Testing:  If you had lab testing today and your results are reassuring or normal they will be mailed to you or sent through [employer] within 7 days. If the lab tests need quick action we will call you with the results. The phone number we will call with results is # [phone] (home) . If this is not the best number please call our clinic and change the number.    Medication Refills:  If you need any refills please call your pharmacy and they will contact us. Our fax number for refills is [phone]. Please allow three business for refill processing. If you need to  your refill at a new pharmacy, please contact the new pharmacy directly. The new pharmacy will help you get your medications transferred.     Scheduling:  If you have any concerns about today's visit or wish to schedule another appointment please call our office during normal business hours [phone] (8-5:00 M-F)    Contact Us:  Please call [phone] during business hours (8-5:00 M-F).  If after clinic hours, or on the weekend, please call  [phone].    Financial Assistance [phone]  [employer] Billing [phone]  Central Billing Office, [facility]: [phone]  [facility] Billing [phone]  Medical Records [phone]  [facility] Patient Bill of Rights [email or web address]       MENTAL HEALTH CRISIS RESOURCES:  For a emergency help, please call 911 or go to the nearest Emergency Department.     Emergency Walk-In Options:   EmPATH Unit @ [facility] ([place]): [phone] - Specialized mental health emergency area designed to be calming  [facility] ([place]): [phone]  [facility] ([place]):  [phone]  [facility] ([place]): [phone]    [facility] Crisis Information:   [place]: [phone]  [place]: [phone]  [place] YI) - Adult: [phone]     Child: [phone]  [place] - Adult: [phone]     Child: [phone]  [place]: [phone]  List of all [facility] resources:   https://[place].gov/dhs/people-we-serve/adults/health-care/mental-health/resources/crisis-contacts.jsp    National Crisis Information:   Crisis Text Line: Text  [place]  to 192458  National Suicide Prevention Lifeline: 5-736-152-YWCX (1-494.765.7409)       For online chat options, visit https://suicidepreventionlifeline.org/chat/  Poison Control Center: 1-118.888.3986  Trans Lifeline: 1-352.638.9518 - Hotline for transgender people of all ages  The [employer]: 2-235-574-6222 - Hotline for LGBT youth     For Non-Emergency Support:   Fast Tracker: Mental Health & Substance Use Disorder Resources -   https://www.fasttrackIfbyphonen.org/       Again thank you for choosing [facility] [place] [facility] and please let us know how we can best partner with you to improve you and your family's health.    You may be receiving a survey regarding this appointment. We would love to have your feedback, both positive and negative. The survey is done by an external company, so your answers are anonymous.

## 2024-01-18 NOTE — NURSING NOTE
Chief Complaint   Patient presents with    Recheck Medication     Bipolar affective disorder, currently depressed, moderate     - Benjamin Crawford, Visit Facilitator

## 2024-01-22 DIAGNOSIS — D31.32 CHOROIDAL NEVUS OF LEFT EYE: Primary | ICD-10-CM

## 2024-02-15 ENCOUNTER — OFFICE VISIT (OUTPATIENT)
Dept: FAMILY MEDICINE | Facility: CLINIC | Age: 49
End: 2024-02-15
Payer: COMMERCIAL

## 2024-02-15 ENCOUNTER — TELEPHONE (OUTPATIENT)
Dept: FAMILY MEDICINE | Facility: CLINIC | Age: 49
End: 2024-02-15

## 2024-02-15 VITALS
HEART RATE: 91 BPM | RESPIRATION RATE: 16 BRPM | BODY MASS INDEX: 29.67 KG/M2 | OXYGEN SATURATION: 99 % | WEIGHT: 173.8 LBS | SYSTOLIC BLOOD PRESSURE: 132 MMHG | HEIGHT: 64 IN | DIASTOLIC BLOOD PRESSURE: 80 MMHG | TEMPERATURE: 97.7 F

## 2024-02-15 DIAGNOSIS — N94.9 VAGINAL SYMPTOM: ICD-10-CM

## 2024-02-15 DIAGNOSIS — G25.81 RESTLESS LEG SYNDROME: ICD-10-CM

## 2024-02-15 DIAGNOSIS — Z12.31 VISIT FOR SCREENING MAMMOGRAM: ICD-10-CM

## 2024-02-15 DIAGNOSIS — R07.89 ATYPICAL CHEST PAIN: Primary | ICD-10-CM

## 2024-02-15 DIAGNOSIS — Z12.11 SCREEN FOR COLON CANCER: ICD-10-CM

## 2024-02-15 DIAGNOSIS — F41.8 SITUATIONAL ANXIETY: ICD-10-CM

## 2024-02-15 DIAGNOSIS — F33.0 MILD EPISODE OF RECURRENT MAJOR DEPRESSIVE DISORDER (H): ICD-10-CM

## 2024-02-15 PROBLEM — F13.20: Status: RESOLVED | Noted: 2019-12-11 | Resolved: 2024-02-15

## 2024-02-15 PROBLEM — F15.10 METHAMPHETAMINE ABUSE (H): Status: RESOLVED | Noted: 2019-12-11 | Resolved: 2024-02-15

## 2024-02-15 LAB
CLUE CELLS: ABNORMAL
TRICHOMONAS, WET PREP: ABNORMAL
WBC'S/HIGH POWER FIELD, WET PREP: ABNORMAL
YEAST, WET PREP: PRESENT

## 2024-02-15 PROCEDURE — 96127 BRIEF EMOTIONAL/BEHAV ASSMT: CPT | Performed by: PHYSICIAN ASSISTANT

## 2024-02-15 PROCEDURE — 99214 OFFICE O/P EST MOD 30 MIN: CPT | Mod: 25 | Performed by: PHYSICIAN ASSISTANT

## 2024-02-15 PROCEDURE — 87210 SMEAR WET MOUNT SALINE/INK: CPT | Performed by: PHYSICIAN ASSISTANT

## 2024-02-15 PROCEDURE — 93000 ELECTROCARDIOGRAM COMPLETE: CPT | Performed by: PHYSICIAN ASSISTANT

## 2024-02-15 RX ORDER — FLUCONAZOLE 150 MG/1
150 TABLET ORAL
Qty: 3 TABLET | Refills: 0 | Status: SHIPPED | OUTPATIENT
Start: 2024-02-15 | End: 2024-02-19

## 2024-02-15 RX ORDER — METRONIDAZOLE 500 MG/1
500 TABLET ORAL 2 TIMES DAILY
Qty: 14 TABLET | Refills: 0 | Status: SHIPPED | OUTPATIENT
Start: 2024-02-15 | End: 2024-02-19

## 2024-02-15 RX ORDER — ROPINIROLE 0.5 MG/1
0.5 TABLET, FILM COATED ORAL 2 TIMES DAILY PRN
Qty: 180 TABLET | Refills: 1 | Status: SHIPPED | OUTPATIENT
Start: 2024-02-15 | End: 2024-06-12

## 2024-02-15 RX ORDER — HYDROXYZINE PAMOATE 50 MG/1
50 CAPSULE ORAL 3 TIMES DAILY PRN
Qty: 90 CAPSULE | Refills: 1 | Status: SHIPPED | OUTPATIENT
Start: 2024-02-15 | End: 2024-06-17

## 2024-02-15 RX ORDER — BUPROPION HYDROCHLORIDE 150 MG/1
150 TABLET ORAL EVERY MORNING
Qty: 90 TABLET | Refills: 1 | Status: SHIPPED | OUTPATIENT
Start: 2024-02-15 | End: 2024-06-17

## 2024-02-15 ASSESSMENT — PATIENT HEALTH QUESTIONNAIRE - PHQ9
SUM OF ALL RESPONSES TO PHQ QUESTIONS 1-9: 7
10. IF YOU CHECKED OFF ANY PROBLEMS, HOW DIFFICULT HAVE THESE PROBLEMS MADE IT FOR YOU TO DO YOUR WORK, TAKE CARE OF THINGS AT HOME, OR GET ALONG WITH OTHER PEOPLE: NOT DIFFICULT AT ALL
SUM OF ALL RESPONSES TO PHQ QUESTIONS 1-9: 7

## 2024-02-15 ASSESSMENT — ANXIETY QUESTIONNAIRES
GAD7 TOTAL SCORE: 4
2. NOT BEING ABLE TO STOP OR CONTROL WORRYING: SEVERAL DAYS
4. TROUBLE RELAXING: SEVERAL DAYS
1. FEELING NERVOUS, ANXIOUS, OR ON EDGE: SEVERAL DAYS
3. WORRYING TOO MUCH ABOUT DIFFERENT THINGS: NOT AT ALL
6. BECOMING EASILY ANNOYED OR IRRITABLE: SEVERAL DAYS
7. FEELING AFRAID AS IF SOMETHING AWFUL MIGHT HAPPEN: NOT AT ALL
5. BEING SO RESTLESS THAT IT IS HARD TO SIT STILL: NOT AT ALL
IF YOU CHECKED OFF ANY PROBLEMS ON THIS QUESTIONNAIRE, HOW DIFFICULT HAVE THESE PROBLEMS MADE IT FOR YOU TO DO YOUR WORK, TAKE CARE OF THINGS AT HOME, OR GET ALONG WITH OTHER PEOPLE: SOMEWHAT DIFFICULT
7. FEELING AFRAID AS IF SOMETHING AWFUL MIGHT HAPPEN: NOT AT ALL
8. IF YOU CHECKED OFF ANY PROBLEMS, HOW DIFFICULT HAVE THESE MADE IT FOR YOU TO DO YOUR WORK, TAKE CARE OF THINGS AT HOME, OR GET ALONG WITH OTHER PEOPLE?: SOMEWHAT DIFFICULT
GAD7 TOTAL SCORE: 4
GAD7 TOTAL SCORE: 4

## 2024-02-15 ASSESSMENT — PAIN SCALES - GENERAL: PAINLEVEL: NO PAIN (0)

## 2024-02-15 NOTE — RESULT ENCOUNTER NOTE
"Please call patient with the following message:  EKG in office within normal limits.  Yeast positive, not BACTERIAL VAGINOSIS. Will send prescription for this.    Thanks  Hyun \"Cam\" LETY Beltran   "

## 2024-02-15 NOTE — PROGRESS NOTES
"  Assessment & Plan     Atypical chest pain  Unclear etiology but EKG reassuring in clinic; advised over the counter and supportive care for GERD type treatment but quick follow up with ED with any worsening or changes in symptoms.  - EKG 12-lead complete w/read - Clinics    Mild episode of recurrent major depressive disorder (H24)  Situational anxiety  Long standing, chronic, controlled- regular refills sent to pharmacy with continued work with psychiatry encouraged.  - buPROPion (WELLBUTRIN XL) 150 MG 24 hr tablet; Take 1 tablet (150 mg) by mouth every morning    Vaginal symptom  - Wet prep - lab collect; Future  - metroNIDAZOLE (FLAGYL) 500 MG tablet; Take 1 tablet (500 mg) by mouth 2 times daily for 7 days  - Wet prep - lab collect  - fluconazole (DIFLUCAN) 150 MG tablet; Take 1 tablet (150 mg) by mouth every 3 days for 3 doses    Restless leg syndrome  - rOPINIRole (REQUIP) 0.5 MG tablet; Take 1 tablet (0.5 mg) by mouth 2 times daily as needed (anxiety)    Visit for screening mammogram  - *MA Screening Digital Bilateral; Future    Screen for colon cancer  - Colonoscopy Screening  Referral; Future    Review of prior external note(s) from - previous routine and speciality notes  30 minutes spent by me on the date of the encounter doing chart review, history and exam, documentation and further activities per the note      BMI  Estimated body mass index is 29.42 kg/m  as calculated from the following:    Height as of this encounter: 1.637 m (5' 4.45\").    Weight as of this encounter: 78.8 kg (173 lb 12.8 oz).         There are no Patient Instructions on file for this visit.    Kenia Mayo is a 48 year old, presenting for the following health issues:  Depression and Chest Pain    History of Present Illness       Reason for visit:  Medication follow up    She eats 0-1 servings of fruits and vegetables daily.She consumes 2 sweetened beverage(s) daily.She exercises with enough effort to increase her " heart rate 60 or more minutes per day.  She exercises with enough effort to increase her heart rate 4 days per week. She is missing 1 dose(s) of medications per week.       Chest Pain  Onset/Duration: 2-3 weeks ago   Description:   Location: right side  Character: sharp  Radiation: on left side  Duration: 30 minutes   Intensity: mild  Progression of Symptoms: intermittent  Accompanying Signs & Symptoms:  Shortness of breath: YES  Sweating: No  Nausea/vomiting: No  Lightheadedness: No  Palpitations: No  Fever/Chills: No  Cough: No           Heartburn: No  History:   Family history of heart disease: No  Tobacco use: No  Previous similar symptoms: no   Precipitating factors:   Worse with exertion: No  Worse with deep breaths: YES           Related to eating: No           Better with burping: No  Alleviating factors: None   Therapies tried and outcome: nothing  Depression Followup  How are you doing with your depression since your last visit? Improved   Are you having other symptoms that might be associated with depression? No  Have you had a significant life event?  No   Are you feeling anxious or having panic attacks?   No  Do you have any concerns with your use of alcohol or other drugs? No    Social History     Tobacco Use    Smoking status: Every Day     Packs/day: 0.00     Years: 5.00     Additional pack years: 0.00     Total pack years: 0.00     Types: Cigarettes, Other    Smokeless tobacco: Former    Tobacco comments:     I'm using the vape mostly. Maybe 2 cigarettes/day.    Vaping Use    Vaping Use: Every day    Substances: Nicotine    Devices: Refillable tank   Substance Use Topics    Alcohol use: No     Comment: none    Drug use: Not Currently     Types: Methamphetamines, Other, Amphetamines, GHB     Comment: GHB         10/25/2023     7:44 AM 1/18/2024     9:27 AM 2/15/2024     7:16 AM   PHQ   PHQ-9 Total Score 10 2 7   Q9: Thoughts of better off dead/self-harm past 2 weeks Not at all Not at all Not at all  "        10/9/2023     9:56 AM 12/13/2023     7:43 AM 2/15/2024     7:17 AM   STEVEN-7 SCORE   Total Score  1 (minimal anxiety) 4 (minimal anxiety)   Total Score 4 1 4       Suicide Assessment Five-step Evaluation and Treatment (SAFE-T)        Review of Systems  Constitutional, neuro, ENT, endocrine, pulmonary, cardiac, gastrointestinal, genitourinary, musculoskeletal, integument and psychiatric systems are negative, except as otherwise noted.      Objective    /80   Pulse 91   Temp 97.7  F (36.5  C) (Temporal)   Resp 16   Ht 1.637 m (5' 4.45\")   Wt 78.8 kg (173 lb 12.8 oz)   LMP  (LMP Unknown)   SpO2 99%   Breastfeeding No   BMI 29.42 kg/m    Body mass index is 29.42 kg/m .  Physical Exam   GENERAL: alert and no distress  NECK: no adenopathy, no asymmetry, masses, or scars  RESP: lungs clear to auscultation - no rales, rhonchi or wheezes  CV: regular rate and rhythm, normal S1 S2, no S3 or S4, no murmur, click or rub, no peripheral edema  MS: no gross musculoskeletal defects noted, no edema  PSYCH: mentation appears normal, affect normal/bright    Results for orders placed or performed in visit on 02/15/24   Wet prep - lab collect     Status: Abnormal    Specimen: Vagina; Swab   Result Value Ref Range    Trichomonas Absent Absent    Yeast Present (A) Absent    Clue Cells Absent Absent    WBCs/high power field 1+ (A) None           Signed Electronically by: Cam Beltran PA-C    "

## 2024-02-15 NOTE — TELEPHONE ENCOUNTER
"Maria Esther called clinic to discuss her lab results, informed her yeast was positive and provider did send in medication for this to pharmacy. Informed her that per Hyun Beltran the EKG was within normal limits as well. Routed to [facility] RN triage to add if they anything further.     \"Please call patient with the following message:  EKG in office within normal limits.  Yeast positive, not BACTERIAL VAGINOSIS. Will send prescription for this.     Thanks  Hyun \"Cam\" LETY Beltran\"  "

## 2024-02-19 RX ORDER — FLUCONAZOLE 150 MG/1
150 TABLET ORAL
Qty: 3 TABLET | Refills: 0 | Status: SHIPPED | OUTPATIENT
Start: 2024-02-19 | End: 2024-08-05

## 2024-02-19 RX ORDER — METRONIDAZOLE 500 MG/1
500 TABLET ORAL 2 TIMES DAILY
Qty: 14 TABLET | Refills: 0 | Status: SHIPPED | OUTPATIENT
Start: 2024-02-19 | End: 2024-08-05

## 2024-02-19 NOTE — TELEPHONE ENCOUNTER
MP,  RAFIQ.    Attempt #2 to reach patient.    Left message for patient to call [facility] back  When patient calls back please transfer to an RN  Анна GARCIA RN

## 2024-02-19 NOTE — TELEPHONE ENCOUNTER
"Trixie Rose RN  2/15/2024 12:41 PM CST Back to Top      Left message for patient to call [facility] back  When patient calls back please transfer to an RN  Trixie WELCH, KITTY Beltran PA-C  2/15/2024  8:59 AM CST       Please call patient with the following message:  EKG in office within normal limits.  Yeast positive, not BACTERIAL VAGINOSIS. Will send prescription for this.     Thanks  Hyun \"Cam\" LETY Beltran     "

## 2024-02-19 NOTE — TELEPHONE ENCOUNTER
Patient called back.  RN did transfer prescriptions to other pharmacy as requested.  Анна GARCIA RN

## 2024-03-05 DIAGNOSIS — N95.8 GENITOURINARY SYNDROME OF MENOPAUSE: ICD-10-CM

## 2024-03-05 DIAGNOSIS — N95.1 MENOPAUSAL SYNDROME (HOT FLASHES): ICD-10-CM

## 2024-04-14 RX ORDER — LAMOTRIGINE 100 MG/1
TABLET ORAL
Qty: 55 TABLET | Refills: 0 | OUTPATIENT
Start: 2024-04-14

## 2024-04-18 ENCOUNTER — TELEPHONE (OUTPATIENT)
Dept: PSYCHIATRY | Facility: CLINIC | Age: 49
End: 2024-04-18
Payer: COMMERCIAL

## 2024-04-18 NOTE — TELEPHONE ENCOUNTER
Per provider request attempted to check in with patient due to missed appointment.  Patient's listed phone number is no longer in service.     Attempted to call patients mother MIGUEL Brady on file, and left voice message requesting a call back at the clinic.

## 2024-05-05 ENCOUNTER — PATIENT OUTREACH (OUTPATIENT)
Dept: CARE COORDINATION | Facility: CLINIC | Age: 49
End: 2024-05-05
Payer: COMMERCIAL

## 2024-06-03 RX ORDER — ESTRADIOL 0.05 MG/D
1 PATCH, EXTENDED RELEASE TRANSDERMAL
Qty: 24 PATCH | Refills: 0 | OUTPATIENT
Start: 2024-06-03

## 2024-06-12 ENCOUNTER — MYC MEDICAL ADVICE (OUTPATIENT)
Dept: FAMILY MEDICINE | Facility: CLINIC | Age: 49
End: 2024-06-12
Payer: COMMERCIAL

## 2024-06-12 ENCOUNTER — MYC REFILL (OUTPATIENT)
Dept: FAMILY MEDICINE | Facility: CLINIC | Age: 49
End: 2024-06-12
Payer: COMMERCIAL

## 2024-06-12 DIAGNOSIS — G25.81 RESTLESS LEG SYNDROME: ICD-10-CM

## 2024-06-12 NOTE — TELEPHONE ENCOUNTER
Early refill request  Patient currently taking 3 tablets daily  See other mychart message today and advise  Thanks,  Trixie WELCH RN

## 2024-06-13 RX ORDER — ROPINIROLE 0.5 MG/1
1.5 TABLET, FILM COATED ORAL DAILY PRN
Qty: 270 TABLET | Refills: 0 | Status: SHIPPED | OUTPATIENT
Start: 2024-06-13 | End: 2024-07-02

## 2024-06-17 ENCOUNTER — MYC REFILL (OUTPATIENT)
Dept: FAMILY MEDICINE | Facility: CLINIC | Age: 49
End: 2024-06-17
Payer: COMMERCIAL

## 2024-06-17 ENCOUNTER — MYC REFILL (OUTPATIENT)
Dept: PSYCHIATRY | Facility: CLINIC | Age: 49
End: 2024-06-17
Payer: COMMERCIAL

## 2024-06-17 DIAGNOSIS — F31.32 BIPOLAR AFFECTIVE DISORDER, CURRENTLY DEPRESSED, MODERATE (H): ICD-10-CM

## 2024-06-17 DIAGNOSIS — F41.8 SITUATIONAL ANXIETY: ICD-10-CM

## 2024-06-17 DIAGNOSIS — F33.0 MILD EPISODE OF RECURRENT MAJOR DEPRESSIVE DISORDER (H): ICD-10-CM

## 2024-06-17 RX ORDER — HYDROXYZINE PAMOATE 50 MG/1
50 CAPSULE ORAL 3 TIMES DAILY PRN
Qty: 90 CAPSULE | Refills: 0 | Status: SHIPPED | OUTPATIENT
Start: 2024-06-17 | End: 2024-08-26

## 2024-06-17 RX ORDER — LAMOTRIGINE 100 MG/1
300 TABLET ORAL DAILY
Qty: 90 TABLET | Refills: 1 | Status: SHIPPED | OUTPATIENT
Start: 2024-06-17 | End: 2024-08-26

## 2024-06-17 RX ORDER — BUPROPION HYDROCHLORIDE 150 MG/1
150 TABLET ORAL EVERY MORNING
Qty: 90 TABLET | Refills: 0 | Status: SHIPPED | OUTPATIENT
Start: 2024-06-17 | End: 2024-07-24

## 2024-06-17 NOTE — TELEPHONE ENCOUNTER
Last seen: 01/18/24  RTC: 8 weeks  Cancel: 0  No-show: 1  Next appt: 0     Incoming refill from Patient via [facility]    Medication requested:   Pending Prescriptions:                       Disp   Refills    lamoTRIgine (LAMICTAL) 100 MG tablet      90 tab*1            Sig: Take 3 tablets (300 mg) by mouth daily      From chart note:   1) Medications:   - Increase lamotrigine from 200 mg to 300 mg      Medication unable to be refilled by RN due to criteria not met as indicated.                 []Eligibility - not seen in the last year              [x]Supervision - no future appointment              [x]Compliance - no shows, cancellations or lapse in therapy              []Verification - order discrepancy              []Controlled medication              []Medication not included in policy              []90-day supply request              []Other:

## 2024-06-29 ENCOUNTER — MYC MEDICAL ADVICE (OUTPATIENT)
Dept: FAMILY MEDICINE | Facility: CLINIC | Age: 49
End: 2024-06-29
Payer: COMMERCIAL

## 2024-06-29 DIAGNOSIS — F33.0 MILD EPISODE OF RECURRENT MAJOR DEPRESSIVE DISORDER (H): ICD-10-CM

## 2024-06-29 DIAGNOSIS — F31.32 BIPOLAR AFFECTIVE DISORDER, CURRENTLY DEPRESSED, MODERATE (H): Primary | ICD-10-CM

## 2024-07-01 NOTE — TELEPHONE ENCOUNTER
MP,  Please see below [employer] message and advise.  Pended referral  Thanks,  Анна HARLEY RN

## 2024-07-02 ENCOUNTER — MYC REFILL (OUTPATIENT)
Dept: FAMILY MEDICINE | Facility: CLINIC | Age: 49
End: 2024-07-02
Payer: COMMERCIAL

## 2024-07-02 DIAGNOSIS — G25.81 RESTLESS LEG SYNDROME: ICD-10-CM

## 2024-07-02 RX ORDER — ROPINIROLE 0.5 MG/1
1.5 TABLET, FILM COATED ORAL DAILY PRN
Qty: 270 TABLET | Refills: 0 | Status: SHIPPED | OUTPATIENT
Start: 2024-07-02 | End: 2024-08-02

## 2024-07-23 ENCOUNTER — TELEPHONE (OUTPATIENT)
Dept: FAMILY MEDICINE | Facility: CLINIC | Age: 49
End: 2024-07-23
Payer: COMMERCIAL

## 2024-07-23 NOTE — TELEPHONE ENCOUNTER
Reason for Call:  Appointment Request    Patient requesting this type of appt:  mental health    Requested provider: Hyun Beltran    Reason patient unable to be scheduled: Not within requested timeframe    When does patient want to be seen/preferred time: 1-2 days    Comments: Pt would like to schedule an appt ASAP, has had some recent life events that have been particularly challenging. Needs to be seen for depression. Mother will be accompanying her to appt    Could we send this information to you in [facility] or would you prefer to receive a phone call?:   Patient would prefer a phone call   Okay to leave a detailed message?: Yes at Home number on file [phone] (home)    Call taken on 7/23/2024 at 11:24 AM by Ginger Ayers

## 2024-07-24 ENCOUNTER — OFFICE VISIT (OUTPATIENT)
Dept: FAMILY MEDICINE | Facility: CLINIC | Age: 49
End: 2024-07-24
Payer: COMMERCIAL

## 2024-07-24 VITALS
DIASTOLIC BLOOD PRESSURE: 84 MMHG | RESPIRATION RATE: 16 BRPM | BODY MASS INDEX: 26.65 KG/M2 | WEIGHT: 156.1 LBS | HEART RATE: 101 BPM | HEIGHT: 64 IN | TEMPERATURE: 97.4 F | OXYGEN SATURATION: 98 % | SYSTOLIC BLOOD PRESSURE: 115 MMHG

## 2024-07-24 DIAGNOSIS — Z12.31 ENCOUNTER FOR SCREENING MAMMOGRAM FOR BREAST CANCER: ICD-10-CM

## 2024-07-24 DIAGNOSIS — Z12.4 CERVICAL CANCER SCREENING: ICD-10-CM

## 2024-07-24 DIAGNOSIS — F33.0 MILD EPISODE OF RECURRENT MAJOR DEPRESSIVE DISORDER (H): ICD-10-CM

## 2024-07-24 DIAGNOSIS — F43.21 GRIEF: ICD-10-CM

## 2024-07-24 DIAGNOSIS — F31.32 BIPOLAR AFFECTIVE DISORDER, CURRENTLY DEPRESSED, MODERATE (H): Primary | ICD-10-CM

## 2024-07-24 DIAGNOSIS — G25.81 RESTLESS LEG SYNDROME: ICD-10-CM

## 2024-07-24 DIAGNOSIS — Z59.00 HOMELESS: ICD-10-CM

## 2024-07-24 PROCEDURE — 99214 OFFICE O/P EST MOD 30 MIN: CPT | Performed by: FAMILY MEDICINE

## 2024-07-24 RX ORDER — BUPROPION HYDROCHLORIDE 300 MG/1
300 TABLET ORAL EVERY MORNING
Qty: 90 TABLET | Refills: 0 | Status: SHIPPED | OUTPATIENT
Start: 2024-07-24

## 2024-07-24 SDOH — ECONOMIC STABILITY - HOUSING INSECURITY: HOMELESSNESS UNSPECIFIED: Z59.00

## 2024-07-24 ASSESSMENT — ANXIETY QUESTIONNAIRES
GAD7 TOTAL SCORE: 9
7. FEELING AFRAID AS IF SOMETHING AWFUL MIGHT HAPPEN: NOT AT ALL
GAD7 TOTAL SCORE: 9
8. IF YOU CHECKED OFF ANY PROBLEMS, HOW DIFFICULT HAVE THESE MADE IT FOR YOU TO DO YOUR WORK, TAKE CARE OF THINGS AT HOME, OR GET ALONG WITH OTHER PEOPLE?: VERY DIFFICULT
6. BECOMING EASILY ANNOYED OR IRRITABLE: SEVERAL DAYS
2. NOT BEING ABLE TO STOP OR CONTROL WORRYING: SEVERAL DAYS
1. FEELING NERVOUS, ANXIOUS, OR ON EDGE: MORE THAN HALF THE DAYS
5. BEING SO RESTLESS THAT IT IS HARD TO SIT STILL: MORE THAN HALF THE DAYS
7. FEELING AFRAID AS IF SOMETHING AWFUL MIGHT HAPPEN: NOT AT ALL
GAD7 TOTAL SCORE: 9
3. WORRYING TOO MUCH ABOUT DIFFERENT THINGS: SEVERAL DAYS
IF YOU CHECKED OFF ANY PROBLEMS ON THIS QUESTIONNAIRE, HOW DIFFICULT HAVE THESE PROBLEMS MADE IT FOR YOU TO DO YOUR WORK, TAKE CARE OF THINGS AT HOME, OR GET ALONG WITH OTHER PEOPLE: VERY DIFFICULT
4. TROUBLE RELAXING: MORE THAN HALF THE DAYS

## 2024-07-24 ASSESSMENT — PAIN SCALES - GENERAL: PAINLEVEL: NO PAIN (0)

## 2024-07-24 ASSESSMENT — PATIENT HEALTH QUESTIONNAIRE - PHQ9
SUM OF ALL RESPONSES TO PHQ QUESTIONS 1-9: 11
10. IF YOU CHECKED OFF ANY PROBLEMS, HOW DIFFICULT HAVE THESE PROBLEMS MADE IT FOR YOU TO DO YOUR WORK, TAKE CARE OF THINGS AT HOME, OR GET ALONG WITH OTHER PEOPLE: VERY DIFFICULT
SUM OF ALL RESPONSES TO PHQ QUESTIONS 1-9: 11

## 2024-07-24 NOTE — PROGRESS NOTES
Assessment & Plan     Bipolar affective disorder, currently depressed, moderate (H)  Mild episode of recurrent major depressive disorder (H24)  Grief  Homeless  50yo here for acute visit for worsening mental health, loss of brother and boyfriend to suicide in the last few months.  Lost her home as she had moved in with boyfriend a month prior to his suicide.  Sees psychiatry at [facility], on lamotrigine through them, but also gets wellbutrin through here.  Has follow-up appt with new yearly psychiatrist there next month on 8/26/24.  ----Will increase wellbutrin XL to 300mg/d. Risks and benefits of medication(s) including potential side effects reviewed with patient.  Questions answered.  Follow-up with psychiatry 8/26/24 and with physical with Cam Beltran in ~3 months.   Crisis line or EMPATH if worsening/concerning sx's.  ----Will refer to Care Coordination- multiple social needs, housing, has not worked regularly for years. Mother also wondering about pt pursuing disability. Should also discuss this with psychiatry.  - Primary Care - Care Coordination Referral; Future  - buPROPion (WELLBUTRIN XL) 300 MG 24 hr tablet; Take 1 tablet (300 mg) by mouth every morning      Encounter for screening mammogram for breast cancer  New referral placed to help her get called/scheduled for mammogram.  Make physical appt for a few months- needs pap, review hormone questions, and multiple routine cares.  - MA Screen Bilateral w/[facility]; Future       Follow-up Visit   Expected date:  Nov 24, 2024 (Approximate)      Follow Up Appointment Details:     Follow-up with whom?: Specify Provider    Provider Name: DASHA BELTRAN    Follow-Up for what?: Adult Preventive    Any Additional Chronic Condition Management?: Depression    How?: In Person                     I spent a total of 35 minutes on the day of the visit.   Time spent by me doing chart review, history and exam, documentation and further activities per the  note          Subjective   Maria Esther is a 49 year old, presenting for the following health issues:  Mental Health Problem    History of Present Illness       Reason for visit:  Mental health eval    She eats 0-1 servings of fruits and vegetables daily.She consumes 6 sweetened beverage(s) daily.She exercises with enough effort to increase her heart rate 10 to 19 minutes per day.  She exercises with enough effort to increase her heart rate 3 or less days per week. She is missing 3 dose(s) of medications per week.     Acute same day visit scheduled due to worsening mental health sx's.  Feeling down and depressed.    Mom is in town.  They've been concerned about her, a lot of loss.  Trying to get her services, homeless now.    More layers of loss.  Brother ZULY and then boyfirend ZULY, had just moved in, so lost her housing.  Now also apart from her dog with her unstable living situation.  Currently living with her aunt, but that is not a long term solution.  This week staying with her mom at her mom's friend's house while her mom is in town.    Pt [facility], so yearly change-over. Next appt 8/26/24 with new psychiatrist.  On lamotrigine through them - 300mg/d.  Wellbutrin XL 150mg/d through here, Cam Beltran.  Also on ropinirole for RLS through here    Baseline sx's-  Mood is awful.  Taking meds, but shuffled around -  Misses a day or two here and there.  Just got a weekly pill box, thinks it will help.  Also open to setting up alarm on her phone.  Has always had ADHD, so forgets.    Mom lives in [place], wants to see if services, possibly disability is an option for pt.    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  Difficult due to the losses, though it's been many yrs since she's had regular work.          Patient Active Problem List   Diagnosis    Restless leg syndrome    Mild major depression (H)    HSV-2 (herpes simplex virus 2) infection     "Borderline personality disorder (H)    Bipolar affective disorder, currently depressed, moderate (H)    Substance use disorder    Vaginal discharge    Dysuria    Well woman exam          No Known Allergies       Review of Systems  Constitutional, neuro, ENT, endocrine, pulmonary, cardiac, gastrointestinal, genitourinary, musculoskeletal, integument and psychiatric systems are negative, except as otherwise noted.      Objective    /84   Pulse 101   Temp 97.4  F (36.3  C) (Temporal)   Resp 16   Ht 1.637 m (5' 4.45\")   Wt 70.8 kg (156 lb 1.6 oz)   LMP  (LMP Unknown)   SpO2 98%   BMI 26.42 kg/m    Body mass index is 26.42 kg/m .  Physical Exam   GENERAL: alert and no distress  EYES: Eyes grossly normal to inspection, PERRL and conjunctivae and sclerae normal  NECK: no adenopathy, no asymmetry, masses, or scars  RESP: lungs clear to auscultation - no rales, rhonchi or wheezes  CV: regular rate and rhythm, normal S1 S2, no S3 or S4, no murmur, click or rub, no peripheral edema  MS: no gross musculoskeletal defects noted, no edema  PSYCH: mentation appears normal, affect normal/bright        1/18/2024     9:27 AM 2/15/2024     7:16 AM 7/24/2024    10:39 AM   PHQ   PHQ-9 Total Score 2 7 11   Q9: Thoughts of better off dead/self-harm past 2 weeks Not at all Not at all Not at all         1/18/2024     9:27 AM 2/15/2024     7:16 AM 7/24/2024    10:39 AM   [facility] Follow-up to PHQ   PHQ-9 9. Suicide Ideation past 2 weeks Not at all Not at all Not at all             12/13/2023     7:43 AM 2/15/2024     7:17 AM 7/24/2024    10:40 AM   STEVEN-7 SCORE   Total Score 1 (minimal anxiety) 4 (minimal anxiety) 9 (mild anxiety)   Total Score 1 4 9               Signed Electronically by: Lani Hernandez MD    "

## 2024-07-25 ENCOUNTER — PATIENT OUTREACH (OUTPATIENT)
Dept: CARE COORDINATION | Facility: CLINIC | Age: 49
End: 2024-07-25
Payer: COMMERCIAL

## 2024-07-25 DIAGNOSIS — Z71.89 OTHER SPECIFIED COUNSELING: Primary | Chronic | ICD-10-CM

## 2024-07-25 NOTE — PROGRESS NOTES
Clinic Care Coordination Contact  Community Health Worker Initial Outreach      Chart Review: PCP referral from Dr. Hernandez (Cam Beltran PA-C, is pt's PCP) on 7/24/24. OV with Dr. Hernandez on 7/24/24 - worsening MH with suicide of brother and boyfriend in last few months. Lost home when she moved in with boyfriend a month prior to his suicide. Currently living with an aunt but this is a short term situation. Psychiatry appt schedule 8/26/24.   Mental Wellness - resources of behavioral health services - no steady work x approx 2013. ? Disability  Financial support - housing      CHW Initial Information Gathering:  Referral Source: PCP  Current living arrangement:: Other (Living short term with her aunt. Pt is homeless.)  Type of residence:: Other (See above.)  Community Resources: County Programs (Insurance, SNAP)  Supplies Currently Used at Home: None  Equipment Currently Used at Home: none  Informal Support system:: Family  No PCP office visit in Past Year: No  Transportation means:: Regular car  CHW Additional Questions  If ED/Hospital discharge, follow-up appointment scheduled as recommended?: N/A  Medication changes made following ED/Hospital discharge?: N/A  MyChart active?: Yes  Patient sent Social Determinants of Health questionnaire?: Yes      Patient accepts CC: Yes. Patient scheduled for assessment with AYLIN Nelson, on 7/30/24 at 11:00 am. Patient noted desire to discuss housing resources, MH resources, financial assistance, volunteer opportunities.     Spoke with pt and pt's mother, Leigh Haro, who is visiting from [place]. [facility] gave permission for CHW to speak with her mother this morning. Leigh gave 95% of the history below, and was very thoughtful in her demeanor and choice of words in speaking about her daughter's history:  Grief - Loss of pt's brother tragically a year ago. Then loss of boyfriend recently due to suicide. PT is depressed and her mother is concerned for her. In the last year,  pt has gone to live with her parents in [place] for short times due to her mental illness due to grief.  Probation - finishing a short term probation which will end the beginning of Nov 2024 due to good behavior  Housing - evaporated when her boyfriend passed away as she was living at his house. Pt is currently living with an aunt, but this is only short term.  Temporarily services - to help pt get back on her feet. Leigh mentions counseling, nursing to rebuild a healthy lifestyle.  Employment - no income. Leigh is wondering if pt could build up to working a regular job by doing some volunteer opportunities. Eligh has been helping to provide for pt financially.  MH issues for which she has been medicated as reported by Leigh - bipolar, ADHD, attachment disorder, depression  Transportation - parents gave her a car   County benefits - SNAP, but no cash, PMAP insurance  CHW spoke directly to pt in asking if she had mental health crisis line phone numbers available to her. She states she does. CHW also mentioned [facility] at [facility] as a place to go if her MH symptoms became an emergency situation. Pt asked for the phone number and CHW states she should just go directly to the [facility] ED to access [facility] services. Pt and Leigh verbalize understanding.    Lucy Ray  Community Health Worker  [facility]  [phone]

## 2024-07-26 ENCOUNTER — PATIENT OUTREACH (OUTPATIENT)
Dept: CARE COORDINATION | Facility: CLINIC | Age: 49
End: 2024-07-26
Payer: COMMERCIAL

## 2024-07-26 ENCOUNTER — HOSPITAL ENCOUNTER (OUTPATIENT)
Dept: MAMMOGRAPHY | Facility: CLINIC | Age: 49
Discharge: HOME | End: 2024-07-26
Attending: FAMILY MEDICINE | Admitting: FAMILY MEDICINE
Payer: COMMERCIAL

## 2024-07-26 ENCOUNTER — MYC MEDICAL ADVICE (OUTPATIENT)
Dept: FAMILY MEDICINE | Facility: CLINIC | Age: 49
End: 2024-07-26

## 2024-07-26 DIAGNOSIS — Z12.31 ENCOUNTER FOR SCREENING MAMMOGRAM FOR BREAST CANCER: ICD-10-CM

## 2024-07-26 PROCEDURE — 77067 SCR MAMMO BI INCL CAD: CPT

## 2024-07-30 ENCOUNTER — PATIENT OUTREACH (OUTPATIENT)
Dept: NURSING | Facility: CLINIC | Age: 49
End: 2024-07-30
Payer: COMMERCIAL

## 2024-07-30 ASSESSMENT — ACTIVITIES OF DAILY LIVING (ADL): DEPENDENT_IADLS:: MONEY MANAGEMENT

## 2024-07-30 NOTE — LETTER
[facility] CARE COORDINATION  [address]  [place] [address]   August 1, 2024    Maria Esther Haro  [address] [facility] [place] [address]      Dear Maria Esther,    I am a clinic care coordinator who works with Cam Beltran PA-C with the [facility]. I wanted to thank you for spending the time to talk with me.  Below is a description of clinic care coordination and how I can further assist you.       The clinic care coordination team is made up of a registered nurse, , financial resource worker and community health worker who understand the health care system. The goal of clinic care coordination is to help you manage your health and improve access to the health care system. Our team works alongside your provider to assist you in determining your health and social needs. We can help you obtain health care and community resources, providing you with necessary information and education. We can work with you through any barriers and develop a care plan that helps coordinate and strengthen the communication between you and your care team.  Our services are voluntary and are offered without charge to you personally.    Please feel free to contact me with any questions or concerns regarding care coordination and what we can offer.      We are focused on providing you with the highest-quality healthcare experience possible.    Sincerely,     Leslie Morfin, [facility] Care Management  [facility]  Phone: [phone]  E-mail: [email or web address]     Enclosed: I have enclosed a copy of the Patient Centered Plan of Care. This has helpful information and goals that we have talked about. Please keep this in an easy to access place to use as needed.

## 2024-08-01 ENCOUNTER — PATIENT OUTREACH (OUTPATIENT)
Dept: CARE COORDINATION | Facility: CLINIC | Age: 49
End: 2024-08-01
Payer: COMMERCIAL

## 2024-08-01 NOTE — PROGRESS NOTES
Clinic Care Coordination Contact  Clinic Care Coordination Contact  OUTREACH    Referral Information:       Primary Diagnosis: Psychosocial    Chief Complaint   Patient presents with    Clinic Care Coordination - Initial        Universal Utilization:      Utilization      No Show Count (past year)  5             ED Visits  0             Hospital Admissions  0                    Current as of: 8/1/2024 12:33 PM                Clinical Concerns:  Current Medical Concerns:    Patient Active Problem List   Diagnosis    Restless leg syndrome    Mild major depression (H)    HSV-2 (herpes simplex virus 2) infection    Borderline personality disorder (H)    Bipolar affective disorder, currently depressed, moderate (H)    Substance use disorder    Vaginal discharge    Dysuria    Well woman exam        Current Behavioral Concerns: depression, grief, anxiety    Education Provided to patient: community resources   Pain  Pain (GOAL):: No  Health Maintenance Reviewed:    Clinical Pathway:     Medication Management:  Medication review status:   Current Outpatient Medications   Medication Sig Dispense Refill    buPROPion (WELLBUTRIN XL) 300 MG 24 hr tablet Take 1 tablet (300 mg) by mouth every morning 90 tablet 0    estradiol (VAGIFEM) 10 MCG TABS vaginal tablet Insert 1 tablet into the vagina nightly for 1 week, then decrease frequency of use to twice per week, thereafter. (Patient not taking: Reported on 7/24/2024) 24 tablet 3    estradiol (VIVELLE-DOT) 0.05 MG/24HR bi-weekly patch Place 1 patch onto the skin twice a week (Patient not taking: Reported on 7/24/2024) 24 patch 0    fluconazole (DIFLUCAN) 150 MG tablet Take 1 tablet (150 mg) by mouth every 3 days (Patient not taking: Reported on 7/24/2024) 3 tablet 0    hydrOXYzine (VISTARIL) 50 MG capsule Take 1 capsule (50 mg) by mouth 3 times daily as needed for anxiety 90 capsule 0    lamoTRIgine (LAMICTAL) 100 MG tablet Take 3 tablets (300 mg) by mouth daily 90 tablet 1     metroNIDAZOLE (FLAGYL) 500 MG tablet Take 1 tablet (500 mg) by mouth 2 times daily (Patient not taking: Reported on 7/24/2024) 14 tablet 0    miconazole (MICONAZOLE 7) 2 % cream Place 1 applicator vaginally at bedtime (Patient not taking: Reported on 7/24/2024) 45 g 0    progesterone (PROMETRIUM) 100 MG capsule Take 1 capsule (100 mg) by mouth daily (Patient not taking: Reported on 7/24/2024) 90 capsule 1    rOPINIRole (REQUIP) 0.5 MG tablet Take 3 tablets (1.5 mg) by mouth daily as needed (anxiety) 270 tablet 0     No current facility-administered medications for this visit.         Functional Status:  Dependent ADLs:: Independent  Dependent IADLs:: Money Management  Bed or wheelchair confined:: No  Mobility Status: Independent    Living Situation:  Current living arrangement:: Other  Type of residence:: Other    Lifestyle & Psychosocial Needs:    Social Determinants of Health     Food Insecurity: High Risk (7/25/2024)    Food Insecurity     Within the past 12 months, did you worry that your food would run out before you got money to buy more?: Yes     Within the past 12 months, did the food you bought just not last and you didn t have money to get more?: Yes   Depression: Not at risk (7/24/2024)    PHQ-2     PHQ-2 Score: 2   Housing Stability: High Risk (7/25/2024)    Housing Stability     Do you have housing? : No     Are you worried about losing your housing?: Yes   Tobacco Use: High Risk (7/24/2024)    Patient History     Smoking Tobacco Use: Every Day     Smokeless Tobacco Use: Former     Passive Exposure: Not on file   Financial Resource Strain: High Risk (7/25/2024)    Financial Resource Strain     Within the past 12 months, have you or your family members you live with been unable to get utilities (heat, electricity) when it was really needed?: Yes   Alcohol Use: Alcohol Misuse (7/25/2024)    AUDIT-C     Frequency of Alcohol Consumption: 4 or more times a week     Average Number of Drinks: 3 or 4      Frequency of Binge Drinking: Daily or almost daily   Transportation Needs: High Risk (7/30/2024)    Transportation Needs     Within the past 12 months, has lack of transportation kept you from medical appointments, getting your medicines, non-medical meetings or appointments, work, or from getting things that you need?: Yes   Physical Activity: Inactive (7/25/2024)    Exercise Vital Sign     Days of Exercise per Week: 0 days     Minutes of Exercise per Session: 0 min   Interpersonal Safety: Low Risk  (2/15/2024)    Interpersonal Safety     Do you feel physically and emotionally safe where you currently live?: Yes     Within the past 12 months, have you been hit, slapped, kicked or otherwise physically hurt by someone?: No     Within the past 12 months, have you been humiliated or emotionally abused in other ways by your partner or ex-partner?: No   Stress: Stress Concern Present (7/25/2024)    Citizen of [place] [employer] - Occupational Stress Questionnaire     Feeling of Stress : Very much   Social Connections: Socially Isolated (7/25/2024)    Social Connection and Isolation Panel [NHANES]     Frequency of Communication with Friends and Family: Twice a week     Frequency of Social Gatherings with Friends and Family: Never     Attends Yarsanism Services: Never     Active Member of Clubs or Organizations: No     Attends Club or Organization Meetings: Never     Marital Status:    Health Literacy: Not on file     Diet:: Regular  Inadequate nutrition (GOAL):: Yes  Tube Feeding: No  Inadequate activity/exercise (GOAL):: Yes  Significant changes in sleep pattern (GOAL): Yes  Transportation means:: Regular car     Yarsanism or spiritual beliefs that impact treatment:: No  Mental health DX:: Yes  Mental health DX how managed:: Medication, Psychiatrist, Outpatient Counseling  Mental health management concern (GOAL):: Yes  Informal Support system:: Family        [facility] spoke with pt and her mother who was  also present with pt. She shares that due to her mental health concerns, she has been unable to work and support herself. Has not worked steadily since 2013.  She has financial concerns and housing needs. No income. Currently lives with her aunt but this is not a long term solution. She was given a car to use by family members.  Her mother is visiting from out of state. She asked about applying for disability, [facility] provided some general information and recommended they consult with Disability Specialists to see if she may be eligible. She will also be working with [facility] to be screened for [facility] benefits. She had a missed a call from [facility] already, CC provided the call back number.    Sent [employer] message with resource information on Disability Specialists, call back number for [facility], and  Crisis information.       Resources and Interventions:  Current Resources:         Supplies Currently Used at Home: None  Equipment Currently Used at Home: none  Employment Status: unemployed              Referrals Placed: [facility]       Care Plan:  Care Plan: Financial Wellbeing       Problem: Patient expresses financial resource strain       Goal: Create an action plan to increase financial stability       Start Date: 7/30/2024 Expected End Date: 11/1/2024    Priority: High    Note:     Barriers: mental health causing financial instability  Strengths: connected to care coordination  Patient expressed understanding of goal: yes  Action steps to achieve this goal:  1. I will explore the process for SSDI application and consider using Disability Specialists for help with this  2. I will work with [facility] to be screened for [facility] benefits  3. I will remain in communication with [facility] about additional resource needed or barriers I encounter                               Patient/Caregiver understanding: yes    Outreach Frequency: monthly, more frequently as needed  Future Appointments                In 3 weeks Coreen Cueto MD M  [facility]    In 3 months Hyun Beltran PA-C M [facility]            Plan: CC CHW to outreach in 30 days    SHARA Tadeo [facility]  [facility]  Phone: [phone]  E-mail: [email or web address]

## 2024-08-02 ENCOUNTER — MYC REFILL (OUTPATIENT)
Dept: FAMILY MEDICINE | Facility: CLINIC | Age: 49
End: 2024-08-02
Payer: COMMERCIAL

## 2024-08-02 DIAGNOSIS — G25.81 RESTLESS LEG SYNDROME: ICD-10-CM

## 2024-08-05 RX ORDER — ROPINIROLE 0.5 MG/1
1.5 TABLET, FILM COATED ORAL DAILY PRN
Qty: 270 TABLET | Refills: 0 | Status: SHIPPED | OUTPATIENT
Start: 2024-08-05

## 2024-08-13 ENCOUNTER — MYC REFILL (OUTPATIENT)
Dept: PSYCHIATRY | Facility: CLINIC | Age: 49
End: 2024-08-13
Payer: COMMERCIAL

## 2024-08-13 DIAGNOSIS — F31.32 BIPOLAR AFFECTIVE DISORDER, CURRENTLY DEPRESSED, MODERATE (H): ICD-10-CM

## 2024-08-14 NOTE — TELEPHONE ENCOUNTER
"Last seen: 1/18  RTC: 8 weeks  Cancel: 4/18  No-show: None  Next appt: 8/26       Medication requested:   lamoTRIgine (LAMICTAL) 100 MG tablet       From chart note:      1) Medications:   - Increase lamotrigine from 200 mg to 300 mg     With PCP:  buPROPion (WELLBUTRIN XL) 150 MG 24 hr tablet (gives her a kick that she likes)  rOPINIRole (REQUIP) 0.25 MG tablet (works \"great\")  hydrOXYzine (VISTARIL) 50 MG capsule (not taking it, to be removed)  venlafaxine (EFFEXOR XR) 150 MG 24 hr capsule (not taking this)      Refills sent to RN for final review       "

## 2024-08-14 NOTE — TELEPHONE ENCOUNTER
From chart note:        Medication refill approved per refill protocol.     Medication unable to be refilled by RN due to criteria not met as indicated.                 []Eligibility - not seen in the last year              []Supervision - no future appointment              []Compliance - no shows, cancellations or lapse in therapy              []Verification - order discrepancy              []Controlled medication              []Medication not included in policy              []90-day supply request              []Other:

## 2024-08-15 RX ORDER — LAMOTRIGINE 100 MG/1
300 TABLET ORAL DAILY
Qty: 90 TABLET | Refills: 0 | OUTPATIENT
Start: 2024-08-15

## 2024-08-23 ENCOUNTER — TRANSFERRED RECORDS (OUTPATIENT)
Dept: HEALTH INFORMATION MANAGEMENT | Facility: CLINIC | Age: 49
End: 2024-08-23
Payer: COMMERCIAL

## 2024-08-26 ENCOUNTER — OFFICE VISIT (OUTPATIENT)
Dept: PSYCHIATRY | Facility: CLINIC | Age: 49
End: 2024-08-26
Attending: PSYCHIATRY & NEUROLOGY
Payer: COMMERCIAL

## 2024-08-26 ENCOUNTER — TELEPHONE (OUTPATIENT)
Dept: PSYCHIATRY | Facility: CLINIC | Age: 49
End: 2024-08-26
Payer: COMMERCIAL

## 2024-08-26 VITALS
HEART RATE: 82 BPM | SYSTOLIC BLOOD PRESSURE: 134 MMHG | DIASTOLIC BLOOD PRESSURE: 87 MMHG | BODY MASS INDEX: 27.34 KG/M2 | WEIGHT: 161.5 LBS

## 2024-08-26 DIAGNOSIS — F41.8 SITUATIONAL ANXIETY: ICD-10-CM

## 2024-08-26 DIAGNOSIS — F31.32 BIPOLAR AFFECTIVE DISORDER, CURRENTLY DEPRESSED, MODERATE (H): Primary | ICD-10-CM

## 2024-08-26 DIAGNOSIS — F60.3 BORDERLINE PERSONALITY DISORDER (H): ICD-10-CM

## 2024-08-26 DIAGNOSIS — T14.90XA TRAUMA: ICD-10-CM

## 2024-08-26 DIAGNOSIS — F90.9 ATTENTION DEFICIT HYPERACTIVITY DISORDER (ADHD), UNSPECIFIED ADHD TYPE: ICD-10-CM

## 2024-08-26 PROCEDURE — 90792 PSYCH DIAG EVAL W/MED SRVCS: CPT | Mod: GC

## 2024-08-26 RX ORDER — LAMOTRIGINE 25 MG/1
TABLET ORAL
Qty: 180 TABLET | Refills: 0 | Status: SHIPPED | OUTPATIENT
Start: 2024-08-26

## 2024-08-26 RX ORDER — LAMOTRIGINE 100 MG/1
300 TABLET ORAL DAILY
Qty: 90 TABLET | Refills: 1 | Status: SHIPPED | OUTPATIENT
Start: 2024-08-26

## 2024-08-26 RX ORDER — LAMOTRIGINE 100 MG/1
100 TABLET ORAL DAILY
Status: CANCELLED | OUTPATIENT
Start: 2024-08-26

## 2024-08-26 RX ORDER — HYDROXYZINE PAMOATE 50 MG/1
50 CAPSULE ORAL 3 TIMES DAILY PRN
Qty: 135 CAPSULE | Refills: 0 | Status: SHIPPED | OUTPATIENT
Start: 2024-08-26 | End: 2024-10-10

## 2024-08-26 ASSESSMENT — ANXIETY QUESTIONNAIRES
GAD7 TOTAL SCORE: 4
7. FEELING AFRAID AS IF SOMETHING AWFUL MIGHT HAPPEN: NOT AT ALL
GAD7 TOTAL SCORE: 4
GAD7 TOTAL SCORE: 4
8. IF YOU CHECKED OFF ANY PROBLEMS, HOW DIFFICULT HAVE THESE MADE IT FOR YOU TO DO YOUR WORK, TAKE CARE OF THINGS AT HOME, OR GET ALONG WITH OTHER PEOPLE?: VERY DIFFICULT

## 2024-08-26 ASSESSMENT — PATIENT HEALTH QUESTIONNAIRE - PHQ9
SUM OF ALL RESPONSES TO PHQ QUESTIONS 1-9: 11
SUM OF ALL RESPONSES TO PHQ QUESTIONS 1-9: 11
10. IF YOU CHECKED OFF ANY PROBLEMS, HOW DIFFICULT HAVE THESE PROBLEMS MADE IT FOR YOU TO DO YOUR WORK, TAKE CARE OF THINGS AT HOME, OR GET ALONG WITH OTHER PEOPLE: VERY DIFFICULT

## 2024-08-26 ASSESSMENT — PAIN SCALES - GENERAL: PAINLEVEL: NO PAIN (0)

## 2024-08-26 NOTE — TELEPHONE ENCOUNTER
On 8/26/24, the patient signed a Consent to Communicate authorizing Person to Person communication with Caitlyn Haro (Mom) for scheduling and medical information. I scanned this document into the patient's chart on 8/26/24. Alonso Boles    On 8/26/24 the patient signed an MIGUEL authorizing the release of all pertinent records from [facility] for the purpose of continuing care.  I faxed this MIGUEL to [phone] on 8/26/24, stamped that it was faxed, dated and sent to scanning. I held a copy in clinic until scanning complete/confirmed. Alonso Boles

## 2024-08-26 NOTE — NURSING NOTE
Chief Complaint   Patient presents with    Recheck Medication     Borderline personality disorder     - Benjamin Crawford, Visit Facilitator

## 2024-08-26 NOTE — PROGRESS NOTES
.     [facility]  General Clinic Team  TRANSFER of CARE DIAGNOSTIC ASSESSMENT       CARE TEAM:    PCP- Hyun Beltran PA-C   Therapist- [facility] [phone]    Maria Esther is a 49 year old who uses the pronouns she, her, hers.                 Chief Concern    Transition of care and medication management                Assessment & Plan     Borderline personality disorder  Bipolar affective disorder,current episode depressed, moderate  Restless leg syndrome  Attention Deficit Hyperactivity Disorder     Historically:  Methamphetamine Use Disorder, in remission  Gamma-hydroxybutyrate (GHB) use disorder, in remission   Alcohol Use Disorder, in remission      Maria Esther is a 49-year-old female with a history of ADHD since first grade, polysubstance use disorders including using GHB, meth and alcohol (currently in recovery or remission) with current episode of depression (Bipolar Affective Disorder). Patient has had a very difficult time since she last saw us in clinic. She was in senior living, and now is on probation for another couple of months. In addition to her grief from her brother's death last year, her long-term boyfriend  by suicide three months ago, and since she was living with him, she became homeless and is now temporality living with her aunt. She denies any recent manic/hypomanic episodes, as well as suicidal ideation or AVH. However, she feels like she does not know what is next for her in life, and is in current need of optimization of medications to further help her through this depressive episode. She was encouraged to continue weekly therapy, and to contact us in a couple of weeks, to evaluate whether her Lamictal increase has helped. There are no current safety concerns, but I asked that she returns to the clinic within 4-6 weeks or contact us sooner if she needs to be seen emergently.     Future  "Considerations:  - Further exploration of traumatic experiences and PTSD  - Further evaluations for potential bipolar diagnosis    Psychotropic Drug Interactions:  none  Management: N/A    [place]NPMP was not checked today: will be checked next visit    Risk Statements:   Treatment Risk: Risks, benefits, alternatives and potential adverse effects have been discussed and are understood.   Safety Risk: Maria Esther did not appear to be an imminent safety risk to self or others.    PLAN    1) Medications:   - Increase lamotrigine from 300 mg to 350 mg for 1-2 weeks then 400 mg, if tolerated  - Restart atomoxetine (when she contacts us to let us know how is the lamictal or in next visit)  - Restart hydroxyzine 50 mg tid as needed for anxiety     With PCP:  WELLBUTRIN  MG 24 hr tablet (gives her a \"kick\" that she likes)   REQUIP 0.25 MG tablet (works \"great\")      2) Psychotherapy: Follows-up weekly with Carey Hurst, from [facility]    3) Next due:  Labs: N/A   EKG: N/A   Rating scales: N/A    4) Referrals: MAULIK Hayward    5) Follow-up: Return to clinic in 4-6 weeks                   Pertinent Background    Maria Esther first experienced abandonment when she was 5 years old and was adopted by a new family as her biological mom was addicted to heroin.  She was diagnosed with ADHD when she was in first grade and started to receive Ritalin.  She was diagnosed with having a manic episode when she was 22 years old, followed by a diagnosis of borderline personality disorder when she was 25 years old.    Information received from [facility] on 8/30/24 with patient's consent via signed MIGUEL:  \"Maria Esther Is a 49 year old Single, Caucasion female from [place]. She grew up in [place] and she graduated from [employer]. She was adopted when she was [age] and she had ignacio elder sibling (who was biological to her parents named Dannie Haro (Mom and dad named Andria and Tommie Haro). Her mom was a teacher and then " worked for homeland security and Dad worked for [employer]. Her parents were strict with her and they treated her brother differently when he. got famous. Within the past 5 years she feels adopted because of that different treatment. She went to college ([employer] and she majored in [employer]) and got marrled (his name is Art) and had three children (26 Tamiko, 25 Bakari and Chao 14). She had a son with cleft pallet, She is astranged with her ex- Art and two of the three children She reported that she was pregnant with her third child. when her  left. Her parents sent her to the psych quezada and she had counseling and her parents were supportiva. Her younger son (Chao age 14) was adopted (when he was a baby) by her adoptive parents and they live in [place]. She has attachment issues and she does not hug her parents or tell them she loves them, even though she loves her family members very much. The first time she saw Art and her two adult children was at her brother's . They were nice to her, but she does not know how to start a relationship with her adult children. Her 14 year old son understands that she is his mother, but it is weird/uncomfortable to connect wlth him, Her ex- has 5 children by five different woman in five different states. Anyone she got close to, she would push everyone away. Her brother was a  (of [employer]) named Dannie Haro,and he  Devika Ko and they got a TV show. Her brother and his wife became famous. Maria Esther's parents moved to [place] in  to be near their son/her brother.  Her dad had 7 slblings Her Aunt took her in after her divorce.  She had been on probabtion for a case and she had 45 days to move, Her boyfriend of 6 years (Ricki) commited sulcide at the house they shared. He had sevear mental health issues and was hearing voices. He used to live a few hours away from the [facility] on a farm. Maria Esther was very.close to her  "boyfriends family and she has regrets about calling the police to save her boyfriend from his suicidal ideations and hls medlcation non-compliance. Ricki was two weeks off of his meds and he was having major symptoms (Iincluding hearing voices and handeling a gun). Maria Esther called the mental health line and the suicide hotline and got a mobot. She tried to do telehealth with Ricki and he would not sit down for that. He would not eat or take care of himself, and she called the police who reportedly did not handet it well and he commited suicide when the pollce were present. Her brother's suicide was very unexpected as well, Taylor was having issues In his marriage that he did not share-with Maria Esther. When he , his wife did not go to the  or share his property with the family, or allow the family to place his ashes in a grave, His wife (Devika) had reportediy been very rude to the famity and Maria Esther wonders if his wife had done something to him. She wont give anything of his property to the family. They had a pre-nup and was reportedly not entitled to anything (as this turmoll causes her and her parents stress). A day after his death, his wife allegedly called to get the longterm fund. [facility] parents have been battling with their son's wife and [facility] feels bad that she cant be a better support to her parents.  There is nothing left in [place] for her, and her parents want her to move to [place] with them. But moving to [place] would be very triggerlng for her. She reported that she has poor coping skills and abandonment issues and It is diftcult to get close to people. She has been drinking alcohol and she is of meth (since her boyfriend ). She is not working at this time. 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 Her eldest son got  and she was not invited to his wedding (this made her sad). She is working on getting through her grieving process.\"    " "  Pertinent items include: petar , trauma hx, substance use: alcohol and methamphetamine and GHB, mutiple psychotropic trials, and anniversary dates-  when her brother  by suicide. May 2024, her boyfriend  by suicide.                  History of Present Illness     Maria Esther was last seen in our clinic in 24 by her previous resident provider, . Today she shares the following updates:    - She was in [facility] for about 2-months last year due to a sex trafficking incident. She is on probation which is estimated to end in 2024 due to good behavior.  - She had to move out of her house in 2024 (landlord decided he wanted to move in to the house with his family) and she moved in with Ricki, her boyfriend of 7 years.  - In addition to losing her brother to suicide in 2023, she lost her boyfriend to suicide as well in May 9, 2024, shortly after they moved in together. Patient states, \"my boyfriend had schizophrenia, and took olanzapine religiously.\" She then describes that Ricki became dysregulated, since his provider did not renew his medications. \"He stopped eating and caring for himself...The voices were so real to him.\" Maria Esther says that she called for a welfare check (no one checked in on Ricki) and sought out help from the police, but the help did not come in soon enough. After boyfriend's , she says, \"my parents flew me to [place].\"  - Patient then returned to the [facility] and has been living with her aunt in [facility].   - Patient says she feels guilty for not being with her parents, even though she and her parents are still grieving the loss of her brother. She says her parents have had to deal with her brother's wife and that there is suspicion that she was involved with his death. She wishes to helps her parents more, but at the same time being in [place] is triggering for her. Not only being there reminds her of her brother, but also her youngest son " "(legally adopted by her parents) lives with them, and she does not have a relationship with her son. However, patient does affirm not having \"anything left here [in [place]] and I kind of need my parents.\" She comments that one of his oldest sons lives in the [facility], and he got  over the summer, and did not invite her to the wedding. She was hurt when she found out, but also understands as she does not have a mother-son-relationship with him.   - Patient says that the loss of her boyfriend \"pushed me to my limit.\" She feels depressed, and unsure about what to do next in her life. She also does not have stable housing (I told her I would connect her to our  for extra support).   - Denies SI/HI. Says last manic episode was \"possibly years ago.\"  ADHD - says her thoughts are \"everywhere\" and that she needs focusing and would like to try Atomoxetine again.  Sleep - has been sleeping more from 9 pm to 11 am the next day.  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   Living situation lives with her aunt in [place] [facility] [facility]. Has two sons (26 & 27 year olds) in the [facility] that live independently, and her 15 yo son lives with her parents in [place].  Social/spiritual support: parents, aunt      Pertinent Substance Use:  Alcohol: Yes: a couple of glasses of wine weekly  Cannabis: No  Tobacco: No  Caffeine:  Yes: 1-2 Cokes per day   Opioids: No   Narcan Kit current: N/A  Other substances: No                Physical Exam  (Vitals Only)    BP (!) 148/87   Pulse 103   Wt 73.3 kg (161 lb 8 oz)   LMP  (LMP Unknown)   BMI 27.34 kg/m    Pulse Readings from Last 3 Encounters:   08/26/24 103   07/24/24 101   02/15/24 91     Wt Readings from Last 3 Encounters:   08/26/24 73.3 kg (161 lb 8 oz)   07/24/24 70.8 kg " (156 lb 1.6 oz)   02/15/24 78.8 kg (173 lb 12.8 oz)     BP Readings from Last 3 Encounters:   24 (!) 148/87   24 115/84   02/15/24 132/80                  Mental Status Exam    Alertness: alert  and oriented  Appearance: adequately groomed  Behavior/Demeanor: cooperative and pleasant, with good  eye contact   Speech: regular rate and rhythm  Language: intact  Psychomotor: normal or unremarkable  Mood: depressed  Affect: tearful, and congruent to: mood- yes, content- yes  Thought Process/Associations: unremarkable  Thought Content:  Reports none;  Denies suicidal & violent ideation and delusions  Perception:  Reports none;  Denies hallucinations  Insight: good  Judgment: fair  Cognition: does  appear grossly intact; formal cognitive testing was not done  Gait and Station: unremarkable                Family History     Her biological father  from suicide and her mom had heroine use disorder.                Past Psychiatric History     Self injurious behavior [method, most recent]: No  Suicide attempt [#, most recent, method]: No  Suicidal ideation hx [passive, active]: No     Violence/Aggression Hx:No   Psychosis Hx: No   Eating Disorder Hx: No  Trauma hx: Yes: Being abandoned by her biological family     Psych Hosp [#, most recent]: No   Commitment: No   TMS/ECT: No   Outpatient Programs [Day treatment, DBT, eating disorder tx, etc]: No     SUBSTANCE USE HISTORY   Past Use: Yes: Alcohol, GHB (has had at least 4 overdoses on it), meth  Treatment [#, most recent]: Yes: Multiple occasions she cannot remember the most recent. Patient had 30-day residential treatment at [facility] in ; returned to [place] after residential and relapsed on use. Later in  found out she was pregnant, so stopped using successfully for 8 months. Relapsed sometime shortly after giving birth.   Medical Consequences: No   Legal Consequences: No, she is currently on probation due to a sexual trafficking incident.   "She was in long-term for 2 months for this incident                Past Psychotropic Medication Trials      Medication Max Dose (mg) Dates / Duration Helpful? DC Reason / Adverse Effects?   Depakote Does not know   Yes For being pregnant; may have also gained weight   Prozac       Had a negative impact on her ADHD   Paxil       Does not remember   Venlafaxine 150       No effects/ Did not end up taking \"too much going on at the time\" --clarified on 8//26/24   Wellbutrin 150 mg     Currently on it   Topiramate     Yes Stop working when she was experiencing menopausal symptoms    Hydroxyzine     Yes  Helped with anxiety                              Atomoxetine/Strattera -  does not remember when was it tried, or how much did she take                Past Medical History     Patient Active Problem List   Diagnosis    Restless leg syndrome    Mild major depression (H)    HSV-2 (herpes simplex virus 2) infection    Borderline personality disorder (H)    Bipolar affective disorder, currently depressed, moderate (H)    Substance use disorder    Vaginal discharge    Dysuria    Well woman exam                 Allergies     Patient has no known allergies.                Medications     Current Outpatient Medications   Medication Sig Dispense Refill    buPROPion (WELLBUTRIN XL) 300 MG 24 hr tablet Take 1 tablet (300 mg) by mouth every morning 90 tablet 0    lamoTRIgine (LAMICTAL) 100 MG tablet Take 3 tablets (300 mg) by mouth daily 90 tablet 1    rOPINIRole (REQUIP) 0.5 MG tablet Take 3 tablets (1.5 mg) by mouth daily as needed (anxiety) 270 tablet 0    estradiol (VAGIFEM) 10 MCG TABS vaginal tablet Insert 1 tablet into the vagina nightly for 1 week, then decrease frequency of use to twice per week, thereafter. (Patient not taking: Reported on 7/24/2024) 24 tablet 3    estradiol (VIVELLE-DOT) 0.05 MG/24HR bi-weekly patch Place 1 patch onto the skin twice a week (Patient not taking: Reported on 7/24/2024) 24 patch 0    hydrOXYzine " (VISTARIL) 50 MG capsule Take 1 capsule (50 mg) by mouth 3 times daily as needed for anxiety (Patient not taking: Reported on 8/26/2024) 90 capsule 0    progesterone (PROMETRIUM) 100 MG capsule Take 1 capsule (100 mg) by mouth daily (Patient not taking: Reported on 7/24/2024) 90 capsule 1                   Data         10/9/2023     9:56 AM 10/25/2023     8:03 AM 8/26/2024     1:04 PM   PROMIS-10 Total Score w/o Sub Scores   PROMIS TOTAL - SUBSCORES 23 24 19 1/21/2019    10:50 AM 10/9/2023     9:56 AM 10/25/2023     8:04 AM   CAGE-AID Total Score   Total Score 4 1 1   Total Score MyChart 4 (A total score of 2 or greater is considered clinically significant)  1 (A total score of 2 or greater is considered clinically significant)         2/15/2024     7:16 AM 7/24/2024    10:39 AM 8/26/2024     1:03 PM   PHQ-9 SCORE   PHQ-9 Total Score MyChart 7 (Mild depression) 11 (Moderate depression) 11 (Moderate depression)   PHQ-9 Total Score 7 11 11         2/15/2024     7:17 AM 7/24/2024    10:40 AM 8/26/2024     1:03 PM   STEVEN-7 SCORE   Total Score 4 (minimal anxiety) 9 (mild anxiety) 4 (minimal anxiety)   Total Score 4 9 4       Liver/Kidney Function, TSH Metabolic Blood counts   Recent Labs   Lab Test 12/14/23  1531 11/07/22  0947   AST 12  --    ALT 14  --    ALKPHOS 118  --    CR 0.81 0.59     Recent Labs   Lab Test 12/14/23  1531   TSH 0.92    Recent Labs   Lab Test 12/14/23  1531   CHOL 188   TRIG 193*   LDL 81   HDL 68     Recent Labs   Lab Test 06/15/21  1612   A1C 5.5     Recent Labs   Lab Test 12/14/23  1531   GLC 88    Recent Labs   Lab Test 04/29/22  1107   WBC 8.1   HGB 15.1   HCT 47.6*   MCV 92           PROVIDER: Coreen Cueto MD    Patient staffed in clinic with Dr. Archer who will sign the note.  Supervisor is Dr. Green.

## 2024-08-26 NOTE — Clinical Note
Zuhair Patel,   I saw this patient today who is  dealing with grief from the death by suicide of her partner (a few months ago), in addition to not processing the death by suicide as well, of her brother last year. She has a history of substance use, and although currently it does not seem like she is using substances, I'd like to prophylactically provide other ways to cope. For instance, do we have any Grief groups/resources that she can use? She gave us consent and signed an MIGUEL to communicate with her therapist Carey at [facility], if this helps with coordination of care. Please let me know if you have any questions.  Thank you.

## 2024-08-27 ENCOUNTER — TELEPHONE (OUTPATIENT)
Dept: PSYCHIATRY | Facility: CLINIC | Age: 49
End: 2024-08-27
Payer: COMMERCIAL

## 2024-08-27 NOTE — TELEPHONE ENCOUNTER
On 8/26/2024 the patient signed an MIGUEL authorizing the release of all pertinent records from [facility] to [facility] for the purpose of continuing care.  I faxed this MIGUEL to [phone] on 8/27/2024, stamped that it was faxed, dated and sent to scanning. I held a copy in clinic until scanning complete/confirmed. Alonso Boles

## 2024-08-27 NOTE — PATIENT INSTRUCTIONS
Maria Esther,    It was really nice meeting you.      Below is the Treatment Plan summary from today's visit:       Medications    - Start taking 350 mg of lamotrigine for the next two weeks. Let us know if the new dosage helps or is causing any side effects, one of the most significant (but also rare) being Baldemar-Antelmo's syndrome (information attached). As such, it is important to let us know if you notice any new skin rash or mouth sores. If there is room for improvement, you may increase the dose to 400 mg after the initial two weeks.       Current Outpatient Medications   Medication Sig Dispense Refill    buPROPion (WELLBUTRIN XL) 300 MG 24 hr tablet Take 1 tablet (300 mg) by mouth every morning 90 tablet 0    hydrOXYzine (VISTARIL) 50 MG capsule Take 1 capsule (50 mg) by mouth 3 times daily as needed for anxiety. 135 capsule 0    lamoTRIgine (LAMICTAL) 100 MG tablet Take 3 tablets (300 mg) by mouth daily. Take 50 mg (two tablets of 25 mg) in addition to 300 mg (for a total of 350 mg daily) for two weeks. If you notice some improvement, and no side effects as noted in your AVS, then proceed to take an additional two tablets for a total of 100 mg (four tablets of 25 mg) in addition to 300 mg (for a total of 400 mg daily). 90 tablet 1    lamoTRIgine (LAMICTAL) 25 MG tablet Take 50 mg (two tablets of 25 mg) in addition to 300 mg (for a total of 350 mg daily) for two weeks. If you notice some improvement, and no side effects as noted in your AVS, then proceed to take an additional two tablets for a total of 100 mg (four tablets of 25 mg) in addition to 300 mg (for a total of 400 mg daily). 180 tablet 0    rOPINIRole (REQUIP) 0.5 MG tablet Take 3 tablets (1.5 mg) by mouth daily as needed (anxiety) 270 tablet 0        Medications Discontinued During This Encounter   Medication Reason    estradiol (VAGIFEM) 10 MCG TABS vaginal tablet     estradiol (VIVELLE-DOT) 0.05 MG/24HR bi-weekly patch     progesterone  (PROMETRIUM) 100 MG capsule         Referrals    - I will ask for our Social Work team to contact you, as we discussed during our appt.    We will see you back in clinic on 10/7 or sooner if you need it    Crisis numbers are below and clinic after hours number is [phone]        **For crisis resources, please see the information at the end of this document**   Patient Education    Thank you for coming to the [facility] [place] [facility].     Lab Testing:  If you had lab testing today and your results are reassuring or normal they will be mailed to you or sent through [employer] within 7 days. If the lab tests need quick action we will call you with the results. The phone number we will call with results is # [phone]. If this is not the best number please call our clinic and change the number.     Medication Refills:  If you need any refills please call your pharmacy and they will contact us. Our fax number for refills is [phone].   Three business days of notice are needed for general medication refill requests.   Five business days of notice are needed for controlled substance refill requests.   If you need to change to a different pharmacy, please contact the new pharmacy directly. The new pharmacy will help you get your medications transferred.     Contact Us:  Please call [phone] during business hours (8-5:00 M-F).   If you have medication related questions after clinic hours, or on the weekend, please call [phone].     Financial Assistance [phone]   Medical Records [phone]       MENTAL HEALTH CRISIS RESOURCES:  For a emergency help, please call 911 or go to the nearest Emergency Department.     Emergency Walk-In Options:   EmPATH Unit @ [facility] ([place]): [phone] - Specialized mental health emergency area designed to be calming  [facility] ([place]): [phone]  [facility] ([place]):  [phone]  [facility] ([place]): [phone]    [facility] Crisis Information:   [place]: [phone]  [place]: [phone]  [place]) - Adult: [phone]     Child: [phone]  [place] - Adult: [phone]     Child: [phone]  [place]: [phone]  List of all [facility] resources:   https://[place].[facility]/dhs/people-we-serve/adults/health-care/mental-health/resources/crisis-contacts.jsp    National Crisis Information:   Crisis Text Line: Text  [place]  to 276986  Suicide & Crisis Lifeline: 988  National Suicide Prevention Lifeline: 4-511-961-TALK (1-655.245.9048)       For online chat options, visit https://suicidepreventionlifeline.org/chat/  Poison Control Center: 1-769.204.5713  Trans Lifeline: 5-419-092-4518 - Hotline for transgender people of all ages  The [employer] Project: 3-177-005-7109 - Hotline for LGBT youth     For Non-Emergency Support:   Fast Tracker: Mental Health & Substance Use Disorder Resources -   https://www.[employer].org/

## 2024-08-28 ENCOUNTER — TELEPHONE (OUTPATIENT)
Dept: PSYCHIATRY | Facility: CLINIC | Age: 49
End: 2024-08-28
Payer: COMMERCIAL

## 2024-08-28 ENCOUNTER — PATIENT OUTREACH (OUTPATIENT)
Dept: CARE COORDINATION | Facility: CLINIC | Age: 49
End: 2024-08-28
Payer: COMMERCIAL

## 2024-08-28 NOTE — PROGRESS NOTES
Clinic Care Coordination Contact  Care Team Conversations    [facility] received referral from Dr. Cueto requesting that grief support group resources be provided to patient. Upon entering chart, it was determined that patient is already being followed by her Primary Care  and Community Health Worker. Referral information was relayed to them, and they will follow up on these resources.     Jeanna Li, [facility]  Clinic Care Coordination  [facility]  [email or web address]  [phone]

## 2024-08-28 NOTE — TELEPHONE ENCOUNTER
On 8/27/2024 patient records were received from [facility]. I faxed the records to scanning on 8/28/2024, and held a copy in the clinic until scanning complete / confirmed.

## 2024-08-29 ENCOUNTER — OFFICE VISIT (OUTPATIENT)
Dept: FAMILY MEDICINE | Facility: CLINIC | Age: 49
End: 2024-08-29
Attending: FAMILY MEDICINE
Payer: COMMERCIAL

## 2024-08-29 ENCOUNTER — ORDERS ONLY (AUTO-RELEASED) (OUTPATIENT)
Dept: FAMILY MEDICINE | Facility: CLINIC | Age: 49
End: 2024-08-29

## 2024-08-29 VITALS
WEIGHT: 156 LBS | OXYGEN SATURATION: 96 % | HEART RATE: 77 BPM | DIASTOLIC BLOOD PRESSURE: 78 MMHG | BODY MASS INDEX: 25.99 KG/M2 | SYSTOLIC BLOOD PRESSURE: 114 MMHG | HEIGHT: 65 IN | TEMPERATURE: 97.9 F | RESPIRATION RATE: 16 BRPM

## 2024-08-29 DIAGNOSIS — Z00.00 ROUTINE GENERAL MEDICAL EXAMINATION AT A HEALTH CARE FACILITY: Primary | ICD-10-CM

## 2024-08-29 DIAGNOSIS — Z13.1 SCREENING FOR DIABETES MELLITUS: ICD-10-CM

## 2024-08-29 DIAGNOSIS — F31.32 BIPOLAR AFFECTIVE DISORDER, CURRENTLY DEPRESSED, MODERATE (H): ICD-10-CM

## 2024-08-29 DIAGNOSIS — Z13.0 SCREENING, ANEMIA, DEFICIENCY, IRON: ICD-10-CM

## 2024-08-29 DIAGNOSIS — Z13.9 ENCOUNTER FOR SCREENING INVOLVING SOCIAL DETERMINANTS OF HEALTH (SDOH): ICD-10-CM

## 2024-08-29 DIAGNOSIS — L70.0 ACNE VULGARIS: ICD-10-CM

## 2024-08-29 DIAGNOSIS — Z13.29 SCREENING FOR THYROID DISORDER: ICD-10-CM

## 2024-08-29 DIAGNOSIS — Z12.11 SCREEN FOR COLON CANCER: ICD-10-CM

## 2024-08-29 LAB
ALBUMIN SERPL BCG-MCNC: 4.7 G/DL (ref 3.5–5.2)
ALP SERPL-CCNC: 99 U/L (ref 40–150)
ALT SERPL W P-5'-P-CCNC: 14 U/L (ref 0–50)
ANION GAP SERPL CALCULATED.3IONS-SCNC: 12 MMOL/L (ref 7–15)
AST SERPL W P-5'-P-CCNC: 19 U/L (ref 0–45)
BASOPHILS # BLD AUTO: 0 10E3/UL (ref 0–0.2)
BASOPHILS NFR BLD AUTO: 1 %
BILIRUB SERPL-MCNC: 0.4 MG/DL
BUN SERPL-MCNC: 10.3 MG/DL (ref 6–20)
CALCIUM SERPL-MCNC: 9.3 MG/DL (ref 8.8–10.4)
CHLORIDE SERPL-SCNC: 104 MMOL/L (ref 98–107)
CREAT SERPL-MCNC: 0.82 MG/DL (ref 0.51–0.95)
EGFRCR SERPLBLD CKD-EPI 2021: 88 ML/MIN/1.73M2
EOSINOPHIL # BLD AUTO: 0.4 10E3/UL (ref 0–0.7)
EOSINOPHIL NFR BLD AUTO: 6 %
ERYTHROCYTE [DISTWIDTH] IN BLOOD BY AUTOMATED COUNT: 12.6 % (ref 10–15)
FERRITIN SERPL-MCNC: 78 NG/ML (ref 6–175)
GLUCOSE SERPL-MCNC: 97 MG/DL (ref 70–99)
HCO3 SERPL-SCNC: 25 MMOL/L (ref 22–29)
HCT VFR BLD AUTO: 42.6 % (ref 35–47)
HGB BLD-MCNC: 14.4 G/DL (ref 11.7–15.7)
IMM GRANULOCYTES # BLD: 0 10E3/UL
IMM GRANULOCYTES NFR BLD: 0 %
IRON BINDING CAPACITY (ROCHE): 345 UG/DL (ref 240–430)
IRON SATN MFR SERPL: 33 % (ref 15–46)
IRON SERPL-MCNC: 113 UG/DL (ref 37–145)
LYMPHOCYTES # BLD AUTO: 2.4 10E3/UL (ref 0.8–5.3)
LYMPHOCYTES NFR BLD AUTO: 32 %
MCH RBC QN AUTO: 29.3 PG (ref 26.5–33)
MCHC RBC AUTO-ENTMCNC: 33.8 G/DL (ref 31.5–36.5)
MCV RBC AUTO: 87 FL (ref 78–100)
MONOCYTES # BLD AUTO: 0.5 10E3/UL (ref 0–1.3)
MONOCYTES NFR BLD AUTO: 7 %
NEUTROPHILS # BLD AUTO: 4 10E3/UL (ref 1.6–8.3)
NEUTROPHILS NFR BLD AUTO: 54 %
PLATELET # BLD AUTO: 349 10E3/UL (ref 150–450)
POTASSIUM SERPL-SCNC: 3.9 MMOL/L (ref 3.4–5.3)
PROT SERPL-MCNC: 7.6 G/DL (ref 6.4–8.3)
RBC # BLD AUTO: 4.91 10E6/UL (ref 3.8–5.2)
SODIUM SERPL-SCNC: 141 MMOL/L (ref 135–145)
TSH SERPL DL<=0.005 MIU/L-ACNC: 2.11 UIU/ML (ref 0.3–4.2)
WBC # BLD AUTO: 7.5 10E3/UL (ref 4–11)

## 2024-08-29 PROCEDURE — 83540 ASSAY OF IRON: CPT | Performed by: PHYSICIAN ASSISTANT

## 2024-08-29 PROCEDURE — 85025 COMPLETE CBC W/AUTO DIFF WBC: CPT | Performed by: PHYSICIAN ASSISTANT

## 2024-08-29 PROCEDURE — 99213 OFFICE O/P EST LOW 20 MIN: CPT | Mod: 25 | Performed by: PHYSICIAN ASSISTANT

## 2024-08-29 PROCEDURE — 99396 PREV VISIT EST AGE 40-64: CPT | Performed by: PHYSICIAN ASSISTANT

## 2024-08-29 PROCEDURE — 80053 COMPREHEN METABOLIC PANEL: CPT | Performed by: PHYSICIAN ASSISTANT

## 2024-08-29 PROCEDURE — 82728 ASSAY OF FERRITIN: CPT | Performed by: PHYSICIAN ASSISTANT

## 2024-08-29 PROCEDURE — 83550 IRON BINDING TEST: CPT | Performed by: PHYSICIAN ASSISTANT

## 2024-08-29 PROCEDURE — 36415 COLL VENOUS BLD VENIPUNCTURE: CPT | Performed by: PHYSICIAN ASSISTANT

## 2024-08-29 PROCEDURE — 84443 ASSAY THYROID STIM HORMONE: CPT | Performed by: PHYSICIAN ASSISTANT

## 2024-08-29 RX ORDER — SPIRONOLACTONE 50 MG/1
50 TABLET, FILM COATED ORAL DAILY
Qty: 90 TABLET | Refills: 1 | Status: SHIPPED | OUTPATIENT
Start: 2024-08-29

## 2024-08-29 RX ORDER — TRAZODONE HYDROCHLORIDE 50 MG/1
50-100 TABLET, FILM COATED ORAL AT BEDTIME
Qty: 60 TABLET | Refills: 1 | Status: SHIPPED | OUTPATIENT
Start: 2024-08-29

## 2024-08-29 SDOH — HEALTH STABILITY: PHYSICAL HEALTH: ON AVERAGE, HOW MANY DAYS PER WEEK DO YOU ENGAGE IN MODERATE TO STRENUOUS EXERCISE (LIKE A BRISK WALK)?: 0 DAYS

## 2024-08-29 ASSESSMENT — SOCIAL DETERMINANTS OF HEALTH (SDOH): HOW OFTEN DO YOU GET TOGETHER WITH FRIENDS OR RELATIVES?: PATIENT DECLINED

## 2024-08-29 NOTE — PATIENT INSTRUCTIONS
Patient Education   Preventive Care Advice   This is general advice given by our system to help you stay healthy. However, your care team may have specific advice just for you. Please talk to your care team about your preventive care needs.  Nutrition  Eat 5 or more servings of fruits and vegetables each day.  Try wheat bread, brown rice and whole grain pasta (instead of white bread, rice, and pasta).  Get enough calcium and vitamin D. Check the label on foods and aim for 100% of the RDA (recommended daily allowance).  Lifestyle  Exercise at least 150 minutes each week  (30 minutes a day, 5 days a week).  Do muscle strengthening activities 2 days a week. These help control your weight and prevent disease.  No smoking.  Wear sunscreen to prevent skin cancer.  Have a dental exam and cleaning every 6 months.  Yearly exams  See your health care team every year to talk about:  Any changes in your health.  Any medicines your care team has prescribed.  Preventive care, family planning, and ways to prevent chronic diseases.  Shots (vaccines)   HPV shots (up to age 26), if you've never had them before.  Hepatitis B shots (up to age 59), if you've never had them before.  COVID-19 shot: Get this shot when it's due.  Flu shot: Get a flu shot every year.  Tetanus shot: Get a tetanus shot every 10 years.  Pneumococcal, hepatitis A, and RSV shots: Ask your care team if you need these based on your risk.  Shingles shot (for age 50 and up)  General health tests  Diabetes screening:  Starting at age 35, Get screened for diabetes at least every 3 years.  If you are younger than age 35, ask your care team if you should be screened for diabetes.  Cholesterol test: At age 39, start having a cholesterol test every 5 years, or more often if advised.  Bone density scan (DEXA): At age 50, ask your care team if you should have this scan for osteoporosis (brittle bones).  Hepatitis C: Get tested at least once in your life.  STIs (sexually  transmitted infections)  Before age 24: Ask your care team if you should be screened for STIs.  After age 24: Get screened for STIs if you're at risk. You are at risk for STIs (including HIV) if:  You are sexually active with more than one person.  You don't use condoms every time.  You or a partner was diagnosed with a sexually transmitted infection.  If you are at risk for HIV, ask about PrEP medicine to prevent HIV.  Get tested for HIV at least once in your life, whether you are at risk for HIV or not.  Cancer screening tests  Cervical cancer screening: If you have a cervix, begin getting regular cervical cancer screening tests starting at age 21.  Breast cancer scan (mammogram): If you've ever had breasts, begin having regular mammograms starting at age 40. This is a scan to check for breast cancer.  Colon cancer screening: It is important to start screening for colon cancer at age 45.  Have a colonoscopy test every 10 years (or more often if you're at risk) Or, ask your provider about stool tests like a FIT test every year or Cologuard test every 3 years.  To learn more about your testing options, visit:   .  For help making a decision, visit:   https://bit.ly/fz17554.  Prostate cancer screening test: If you have a prostate, ask your care team if a prostate cancer screening test (PSA) at age 55 is right for you.  Lung cancer screening: If you are a current or former smoker ages 50 to 80, ask your care team if ongoing lung cancer screenings are right for you.  For informational purposes only. Not to replace the advice of your health care provider. Copyright   2023 [facility]. All rights reserved. Clinically reviewed by the [facility]. [employer] 555784 - REV 01/24.  Learning About Stress  What is stress?     Stress is your body's response to a hard situation. Your body can have a physical, emotional, or mental response. Stress is a fact of life for most people, and it  affects everyone differently. What causes stress for you may not be stressful for someone else.  A lot of things can cause stress. You may feel stress when you go on a job interview, take a test, or run a race. This kind of short-term stress is normal and even useful. It can help you if you need to work hard or react quickly. For example, stress can help you finish an important job on time.  Long-term stress is caused by ongoing stressful situations or events. Examples of long-term stress include long-term health problems, ongoing problems at work, or conflicts in your family. Long-term stress can harm your health.  How does stress affect your health?  When you are stressed, your body responds as though you are in danger. It makes hormones that speed up your heart, make you breathe faster, and give you a burst of energy. This is called the fight-or-flight stress response. If the stress is over quickly, your body goes back to normal and no harm is done.  But if stress happens too often or lasts too long, it can have bad effects. Long-term stress can make you more likely to get sick, and it can make symptoms of some diseases worse. If you tense up when you are stressed, you may develop neck, shoulder, or low back pain. Stress is linked to high blood pressure and heart disease.  Stress also harms your emotional health. It can make you navarro, tense, or depressed. Your relationships may suffer, and you may not do well at work or school.  What can you do to manage stress?  You can try these things to help manage stress:   Do something active. Exercise or activity can help reduce stress. Walking is a great way to get started. Even everyday activities such as housecleaning or yard work can help.  Try yoga or armando chi. These techniques combine exercise and meditation. You may need some training at first to learn them.  Do something you enjoy. For example, listen to music or go to a movie. Practice your hobby or do volunteer  "work.  Meditate. This can help you relax, because you are not worrying about what happened before or what may happen in the future.  Do guided imagery. Imagine yourself in any setting that helps you feel calm. You can use online videos, books, or a teacher to guide you.  Do breathing exercises. For example:  From a standing position, bend forward from the waist with your knees slightly bent. Let your arms dangle close to the floor.  Breathe in slowly and deeply as you return to a standing position. Roll up slowly and lift your head last.  Hold your breath for just a few seconds in the standing position.  Breathe out slowly and bend forward from the waist.  Let your feelings out. Talk, laugh, cry, and express anger when you need to. Talking with supportive friends or family, a counselor, or a elin leader about your feelings is a healthy way to relieve stress. Avoid discussing your feelings with people who make you feel worse.  Write. It may help to write about things that are bothering you. This helps you find out how much stress you feel and what is causing it. When you know this, you can find better ways to cope.  What can you do to prevent stress?  You might try some of these things to help prevent stress:  Manage your time. This helps you find time to do the things you want and need to do.  Get enough sleep. Your body recovers from the stresses of the day while you are sleeping.  Get support. Your family, friends, and community can make a difference in how you experience stress.  Limit your news feed. Avoid or limit time on social media or news that may make you feel stressed.  Do something active. Exercise or activity can help reduce stress. Walking is a great way to get started.  Where can you learn more?  Go to https://www.healthwise.net/patiented  Enter N032 in the search box to learn more about \"Learning About Stress.\"  Current as of: October 24, 2023               Content Version: 14.0    6920-2208 " [employer].   Care instructions adapted under license by your healthcare professional. If you have questions about a medical condition or this instruction, always ask your healthcare professional. Healthwise[employer] disclaims any warranty or liability for your use of this information.

## 2024-08-29 NOTE — PROGRESS NOTES
Preventive Care Visit  [facility]E Beltran PA-C, Family Medicine  Aug 29, 2024      Assessment & Plan     Routine general medical examination at a health care facility  Routine labs updated today; continue to work on healthy diet and exercise    Bipolar affective disorder, currently depressed, moderate (H)  Long standing, chronic, UNcontrolled- alternative referral for Collaborative care psychiatry may be helpful given previous inconstancies with providers/residents through psychiatry/addiction medicine; patient ok with this option and referral placed; trial of trazodone sent to pharmacy to focus on sleep at this time, but option for trazodone at bedtime to start; ok for patient to update via [employer] in the next few weeks if not helpful and will consider seroquel or other alternative at that time. Return to clinic with any worsening or changes in symptoms or go to ER Urgent care in off hours.  - traZODone (DESYREL) 50 MG tablet; Take 1-2 tablets ( mg) by mouth at bedtime.  - Adult Mental Health  Referral; Future    Acne vulgaris  Trial of spironolactone with over the counter and supportive care discussed with patient as well; next steps with referral for dermatology placed  - Adult Dermatology  Referral; Future  - spironolactone (ALDACTONE) 50 MG tablet; Take 1 tablet (50 mg) by mouth daily.    Screening, anemia, deficiency, iron  - CBC with Platelets & Differential; Future  - Ferritin; Future  - Iron & Iron Binding Capacity; Future    Screening for thyroid disorder  - TSH with free T4 reflex; Future    Screening for diabetes mellitus  - Comprehensive metabolic panel; Future    Screen for colon cancer  - COLOGUARD(EXACT SCIENCES); Future    Encounter for screening involving social determinants of health (SDoH)      Patient has been advised of split billing requirements and indicates understanding: Yes        BMI  Estimated body mass index is 25.96 kg/m  as  "calculated from the following:    Height as of this encounter: 1.651 m (5' 5\").    Weight as of this encounter: 70.8 kg (156 lb).       Counseling  Appropriate preventive services were addressed with this patient via screening, questionnaire, or discussion as appropriate for fall prevention, nutrition, physical activity, Tobacco-use cessation, social engagement, weight loss and cognition.  Checklist reviewing preventive services available has been given to the patient.  Reviewed patient's diet, addressing concerns and/or questions.       See Patient Instructions    Subjective   Maria Esther is a 49 year old, presenting for the following:  Physical        8/29/2024    10:38 AM   Additional Questions   Roomed by Kamila WALKER MA   Accompanied by self         8/29/2024    10:38 AM   Patient Reported Additional Medications   Patient reports taking the following new medications none        Health Care Directive  Patient does not have a Health Care Directive or Living Will:     HPI        8/29/2024   General Health   How would you rate your overall physical health? Good   Feel stress (tense, anxious, or unable to sleep) Very much      (!) STRESS CONCERN      8/29/2024   Nutrition   Three or more servings of calcium each day? (!) NO   Diet: Regular (no restrictions)   How many servings of fruit and vegetables per day? (!) I DON'T KNOW   How many sweetened beverages each day? (!) 3            8/29/2024   Exercise   Days per week of moderate/strenous exercise 0 days      (!) EXERCISE CONCERN      8/29/2024   Social Factors   Frequency of gathering with friends or relatives Patient declined   Worry food won't last until get money to buy more Yes   Food not last or not have enough money for food? Yes   Do you have housing? (Housing is defined as stable permanent housing and does not include staying ouside in a car, in a tent, in an abandoned building, in an overnight shelter, or couch-surfing.) No   Are you worried about losing your " housing? Yes   Lack of transportation? No   Unable to get utilities (heat,electricity)? Patient declined   Want help with housing or utility concern? (!) YES      (!) FOOD SECURITY CONCERN PRESENT(!) HOUSING CONCERN PRESENT      8/29/2024   Dental   Dentist two times every year? (!) DECLINE            8/29/2024   TB Screening   Were you born outside of the [place]? No            Today's PHQ-9 Score:       8/26/2024     1:03 PM   PHQ-9 SCORE   PHQ-9 Total Score MyChart 11 (Moderate depression)   PHQ-9 Total Score 11           8/29/2024   Substance Use   If I could quit smoking, I would Neutral   I want to quit somking, worry about health affects Neutral   Willing to make a plan to quit smoking Somewhat disagree   Willing to cut down before quitting Completely disagree   Alcohol more than 3/day or more than 7/wk No   Do you use any other substances recreationally? No        Social History     Tobacco Use    Smoking status: Every Day     Current packs/day: 1.50     Average packs/day: 1.5 packs/day for 27.2 years (40.9 ttl pk-yrs)     Types: Cigarettes     Start date: 6/1/1997    Smokeless tobacco: Current    Tobacco comments:     I'm using the vape   Vaping Use    Vaping status: Every Day    Substances: Nicotine    Devices: Refillable tank   Substance Use Topics    Alcohol use: No     Comment: none    Drug use: Yes     Types: Methamphetamines, Amphetamines, GHB     Comment: GHB all day           7/26/2024   LAST FHS-7 RESULTS   1st degree relative breast or ovarian cancer Unknown   Any relative bilateral breast cancer Unknown   Any male have breast cancer Unknown   Any ONE woman have BOTH breast AND ovarian cancer Unknown   Any woman with breast cancer before 50yrs Unknown   2 or more relatives with breast AND/OR ovarian cancer Unknown   2 or more relatives with breast AND/OR bowel cancer Unknown           Mammogram Screening - Mammogram every 1-2 years updated in Health Maintenance based on mutual decision making         2024   STI Screening   New sexual partner(s) since last STI/HIV test? No        History of abnormal Pap smear: No - age 30- 64 PAP with HPV every 5 years recommended        Latest Ref Rng & Units 12/10/2019    10:05 AM 12/10/2019    10:04 AM 10/31/2018     4:05 PM   PAP / HPV   PAP (Historical)   NIL  NIL    HPV 16 DNA NEG^Negative Negative      HPV 18 DNA NEG^Negative Negative      Other HR HPV NEG^Negative Negative        ASCVD Risk   The 10-year ASCVD risk score (Rodrigo JACKSON, et al., 2019) is: 1.6%    Values used to calculate the score:      Age: 49 years      Sex: Female      Is Non- : No      Diabetic: No      Tobacco smoker: Yes      Systolic Blood Pressure: 114 mmHg      Is BP treated: No      HDL Cholesterol: 68 mg/dL      Total Cholesterol: 188 mg/dL       Reviewed and updated as needed this visit by Provider   Tobacco  Allergies  Meds  Problems  Med Hx  Surg Hx  Fam Hx            Past Medical History:   Diagnosis Date    Alcohol abuse, in remission     Remission since     ASCUS on Pap smear 11    HPV-pos. 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, in remission (H)     Since May 2001    Other, mixed, or unspecified nondependent drug abuse, in remission     In remission since  (cocaine and ectasy)    Restless leg syndrome      Past Surgical History:   Procedure Laterality Date    ABDOMEN SURGERY  2010    Csection    BREAST SURGERY  2007    Augmentation    COSMETIC SURGERY  2007    Breast surgery    ZZC ANESTH,SURG BREAST RECONSTRUCTIVE      Breast augmentation    [facility] NONSPECIFIC PROCEDURE   &      x 3, previous tear in cervix     Labs reviewed in EPIC  BP Readings from Last 3 Encounters:   24 114/78   24 134/87   24 115/84    Wt Readings from  Last 3 Encounters:   24 70.8 kg (156 lb)   24 73.3 kg (161 lb 8 oz)   24 70.8 kg (156 lb 1.6 oz)                  Patient Active Problem List   Diagnosis    Restless leg syndrome    Mild major depression (H)    HSV-2 (herpes simplex virus 2) infection    Borderline personality disorder (H)    Bipolar affective disorder, currently depressed, moderate (H)    Substance use disorder    Vaginal discharge    Dysuria    Well woman exam     Past Surgical History:   Procedure Laterality Date    ABDOMEN SURGERY  2010    Csection    BREAST SURGERY  2007    Augmentation    COSMETIC SURGERY  2007    Breast surgery    Z ANESTH,SURG BREAST RECONSTRUCTIVE      Breast augmentation    [facility] NONSPECIFIC PROCEDURE   &      x 3, previous tear in cervix       Social History     Tobacco Use    Smoking status: Every Day     Current packs/day: 1.50     Average packs/day: 1.5 packs/day for 27.2 years (40.9 ttl pk-yrs)     Types: Cigarettes     Start date: 1997    Smokeless tobacco: Current    Tobacco comments:     I'm using the vape   Substance Use Topics    Alcohol use: No     Comment: none     Family History   Adopted: Yes   Problem Relation Age of Onset    No Known Problems Mother     No Known Problems Father     Skin Cancer Paternal Grandmother     Unknown/Adopted No family hx of         Pt is adopted    Macular Degeneration No family hx of     Glaucoma No family hx of     Diabetes No family hx of     Hypertension No family hx of          Current Outpatient Medications   Medication Sig Dispense Refill    buPROPion (WELLBUTRIN XL) 300 MG 24 hr tablet Take 1 tablet (300 mg) by mouth every morning 90 tablet 0    hydrOXYzine (VISTARIL) 50 MG capsule Take 1 capsule (50 mg) by mouth 3 times daily as needed for anxiety. 135 capsule 0    lamoTRIgine (LAMICTAL) 100 MG tablet Take 3 tablets (300 mg) by mouth daily. Take 50 mg (two tablets of 25 mg) in addition to 300 mg (for a total of 350 mg  "daily) for two weeks. If you notice some improvement, and no side effects as noted in your AVS, then proceed to take an additional two tablets for a total of 100 mg (four tablets of 25 mg) in addition to 300 mg (for a total of 400 mg daily). 90 tablet 1    lamoTRIgine (LAMICTAL) 25 MG tablet Take 50 mg (two tablets of 25 mg) in addition to 300 mg (for a total of 350 mg daily) for two weeks. If you notice some improvement, and no side effects as noted in your AVS, then proceed to take an additional two tablets for a total of 100 mg (four tablets of 25 mg) in addition to 300 mg (for a total of 400 mg daily). 180 tablet 0    rOPINIRole (REQUIP) 0.5 MG tablet Take 3 tablets (1.5 mg) by mouth daily as needed (anxiety) 270 tablet 0    spironolactone (ALDACTONE) 50 MG tablet Take 1 tablet (50 mg) by mouth daily. 90 tablet 1    traZODone (DESYREL) 50 MG tablet Take 1-2 tablets ( mg) by mouth at bedtime. 60 tablet 1     No Known Allergies  Recent Labs   Lab Test 12/14/23  1531 11/07/22  0947 01/18/22  1217 01/18/22  1217 07/08/21  1309 06/15/21  1612 12/10/19  1004   A1C  --   --   --   --   --  5.5  --    LDL 81  --   --   --   --  133*  --    HDL 68  --   --   --   --  74  --    TRIG 193*  --   --   --   --  106  --    ALT 14  --   --  18  --  20 20   CR 0.81 0.59   < > 0.68  --  0.80 0.60   GFRESTIMATED 90 >90   < > >90  --  89 >90   GFRESTBLACK  --   --   --   --   --  >90 >90   POTASSIUM 3.7 4.6   < > 4.0  --  4.7 3.9   TSH 0.92  --   --   --  1.16 0.99 0.71    < > = values in this interval not displayed.          Review of Systems  Constitutional, neuro, ENT, endocrine, pulmonary, cardiac, gastrointestinal, genitourinary, musculoskeletal, integument and psychiatric systems are negative, except as otherwise noted.     Objective    Exam  /78   Pulse 77   Temp 97.9  F (36.6  C) (Temporal)   Resp 16   Ht 1.651 m (5' 5\")   Wt 70.8 kg (156 lb)   LMP  (LMP Unknown)   SpO2 96%   BMI 25.96 kg/m     Estimated " "body mass index is 25.96 kg/m  as calculated from the following:    Height as of this encounter: 1.651 m (5' 5\").    Weight as of this encounter: 70.8 kg (156 lb).    Physical Exam  GENERAL: alert and no distress  EYES: Eyes grossly normal to inspection, PERRL and conjunctivae and sclerae normal  HENT: ear canals and TM's normal, nose and mouth without ulcers or lesions  NECK: no adenopathy, no asymmetry, masses, or scars  RESP: lungs clear to auscultation - no rales, rhonchi or wheezes  CV: regular rate and rhythm, normal S1 S2, no S3 or S4, no murmur, click or rub, no peripheral edema  MS: no gross musculoskeletal defects noted, no edema  SKIN: no suspicious lesions or rashes  NEURO: Normal strength and tone, mentation intact and speech normal  PSYCH: mentation appears normal, affect normal/bright        Signed Electronically by: Cam Beltran PA-C    "

## 2024-08-30 NOTE — RESULT ENCOUNTER NOTE
"Stef Mayo  Your attached labs are normal. Keep up the good work!  Please contact the office with any questions or concerns.    Hyun Hernandez \"Cam\" LETY Beltran    "

## 2024-09-03 ENCOUNTER — PATIENT OUTREACH (OUTPATIENT)
Dept: CARE COORDINATION | Facility: CLINIC | Age: 49
End: 2024-09-03
Payer: COMMERCIAL

## 2024-09-03 NOTE — PROGRESS NOTES
Clinic Care Coordination Contact  [facility]    Clinical Data: Care Coordinator Outreach    Outreach Documentation Number of Outreach Attempt   9/3/2024   3:22 PM 1       Mailbox if full; unable to leave a VM     Plan: Care Coordinator will try to reach patient again in 10 business days.    Lucy Ray  Community Health Worker  [facility]  [phone]

## 2024-09-06 ENCOUNTER — PATIENT OUTREACH (OUTPATIENT)
Dept: CARE COORDINATION | Facility: CLINIC | Age: 49
End: 2024-09-06
Payer: COMMERCIAL

## 2024-09-06 NOTE — PROGRESS NOTES
Clinic Care Coordination Contact  Care Team Conversations    [facility] outreached to pt via [employer] message with resources to address mental health concerns which she shared with pcp today on the phone. See message for details.     Leslie Morfin [facility]  Phone: [phone]  E-mail: [email or web address]

## 2024-09-08 ENCOUNTER — HOSPITAL ENCOUNTER (EMERGENCY)
Facility: CLINIC | Age: 49
Discharge: HOME | End: 2024-09-08
Attending: EMERGENCY MEDICINE | Admitting: EMERGENCY MEDICINE
Payer: COMMERCIAL

## 2024-09-08 VITALS
DIASTOLIC BLOOD PRESSURE: 78 MMHG | TEMPERATURE: 98.9 F | OXYGEN SATURATION: 97 % | HEART RATE: 85 BPM | SYSTOLIC BLOOD PRESSURE: 128 MMHG | RESPIRATION RATE: 15 BRPM

## 2024-09-08 DIAGNOSIS — R53.1 GENERALIZED WEAKNESS: ICD-10-CM

## 2024-09-08 DIAGNOSIS — F13.20 GAMMA-HYDROXYBUTYRATE (GHB) USE DISORDER, SEVERE (H): ICD-10-CM

## 2024-09-08 DIAGNOSIS — F10.90 ALCOHOL USE DISORDER: ICD-10-CM

## 2024-09-08 LAB
ANION GAP SERPL CALCULATED.3IONS-SCNC: 11 MMOL/L (ref 7–15)
BASOPHILS # BLD AUTO: 0.1 10E3/UL (ref 0–0.2)
BASOPHILS NFR BLD AUTO: 1 %
BUN SERPL-MCNC: 14.6 MG/DL (ref 6–20)
CALCIUM SERPL-MCNC: 9.5 MG/DL (ref 8.8–10.4)
CHLORIDE SERPL-SCNC: 102 MMOL/L (ref 98–107)
CREAT SERPL-MCNC: 0.92 MG/DL (ref 0.51–0.95)
EGFRCR SERPLBLD CKD-EPI 2021: 77 ML/MIN/1.73M2
EOSINOPHIL # BLD AUTO: 0.5 10E3/UL (ref 0–0.7)
EOSINOPHIL NFR BLD AUTO: 4 %
ERYTHROCYTE [DISTWIDTH] IN BLOOD BY AUTOMATED COUNT: 12.8 % (ref 10–15)
FOLATE SERPL-MCNC: 29.3 NG/ML (ref 4.6–34.8)
GLUCOSE SERPL-MCNC: 121 MG/DL (ref 70–99)
HCO3 SERPL-SCNC: 27 MMOL/L (ref 22–29)
HCT VFR BLD AUTO: 41.7 % (ref 35–47)
HGB BLD-MCNC: 14 G/DL (ref 11.7–15.7)
HOLD SPECIMEN: NORMAL
IMM GRANULOCYTES # BLD: 0.1 10E3/UL
IMM GRANULOCYTES NFR BLD: 0 %
LYMPHOCYTES # BLD AUTO: 2.8 10E3/UL (ref 0.8–5.3)
LYMPHOCYTES NFR BLD AUTO: 25 %
MCH RBC QN AUTO: 30.1 PG (ref 26.5–33)
MCHC RBC AUTO-ENTMCNC: 33.6 G/DL (ref 31.5–36.5)
MCV RBC AUTO: 90 FL (ref 78–100)
MONOCYTES # BLD AUTO: 0.9 10E3/UL (ref 0–1.3)
MONOCYTES NFR BLD AUTO: 8 %
NEUTROPHILS # BLD AUTO: 7 10E3/UL (ref 1.6–8.3)
NEUTROPHILS NFR BLD AUTO: 61 %
NRBC # BLD AUTO: 0 10E3/UL
NRBC BLD AUTO-RTO: 0 /100
PLATELET # BLD AUTO: 342 10E3/UL (ref 150–450)
POTASSIUM SERPL-SCNC: 3.7 MMOL/L (ref 3.4–5.3)
RBC # BLD AUTO: 4.65 10E6/UL (ref 3.8–5.2)
SODIUM SERPL-SCNC: 140 MMOL/L (ref 135–145)
VIT B12 SERPL-MCNC: 1686 PG/ML (ref 232–1245)
WBC # BLD AUTO: 11.4 10E3/UL (ref 4–11)

## 2024-09-08 PROCEDURE — 99284 EMERGENCY DEPT VISIT MOD MDM: CPT | Mod: 25

## 2024-09-08 PROCEDURE — 99284 EMERGENCY DEPT VISIT MOD MDM: CPT | Performed by: EMERGENCY MEDICINE

## 2024-09-08 PROCEDURE — 84425 ASSAY OF VITAMIN B-1: CPT | Performed by: EMERGENCY MEDICINE

## 2024-09-08 PROCEDURE — 85025 COMPLETE CBC W/AUTO DIFF WBC: CPT | Performed by: EMERGENCY MEDICINE

## 2024-09-08 PROCEDURE — 96374 THER/PROPH/DIAG INJ IV PUSH: CPT

## 2024-09-08 PROCEDURE — 258N000003 HC RX IP 258 OP 636: Performed by: EMERGENCY MEDICINE

## 2024-09-08 PROCEDURE — 96361 HYDRATE IV INFUSION ADD-ON: CPT

## 2024-09-08 PROCEDURE — 250N000011 HC RX IP 250 OP 636: Performed by: EMERGENCY MEDICINE

## 2024-09-08 PROCEDURE — 82607 VITAMIN B-12: CPT | Performed by: EMERGENCY MEDICINE

## 2024-09-08 PROCEDURE — 36415 COLL VENOUS BLD VENIPUNCTURE: CPT | Performed by: EMERGENCY MEDICINE

## 2024-09-08 PROCEDURE — 82746 ASSAY OF FOLIC ACID SERUM: CPT | Performed by: EMERGENCY MEDICINE

## 2024-09-08 PROCEDURE — 80048 BASIC METABOLIC PNL TOTAL CA: CPT | Performed by: EMERGENCY MEDICINE

## 2024-09-08 RX ORDER — THIAMINE HYDROCHLORIDE 100 MG/ML
500 INJECTION, SOLUTION INTRAMUSCULAR; INTRAVENOUS 3 TIMES DAILY
Status: DISCONTINUED | OUTPATIENT
Start: 2024-09-08 | End: 2024-09-08 | Stop reason: HOSPADM

## 2024-09-08 RX ORDER — THIAMINE HYDROCHLORIDE 100 MG/ML
250 INJECTION, SOLUTION INTRAMUSCULAR; INTRAVENOUS DAILY
Status: DISCONTINUED | OUTPATIENT
Start: 2024-09-10 | End: 2024-09-08 | Stop reason: HOSPADM

## 2024-09-08 RX ADMIN — THIAMINE HYDROCHLORIDE 500 MG: 100 INJECTION, SOLUTION INTRAMUSCULAR; INTRAVENOUS at 00:37

## 2024-09-08 RX ADMIN — SODIUM CHLORIDE, POTASSIUM CHLORIDE, SODIUM LACTATE AND CALCIUM CHLORIDE 1000 ML: 600; 310; 30; 20 INJECTION, SOLUTION INTRAVENOUS at 00:38

## 2024-09-08 ASSESSMENT — ACTIVITIES OF DAILY LIVING (ADL)
ADLS_ACUITY_SCORE: 35

## 2024-09-08 NOTE — ED TRIAGE NOTES
Been taking fireballs shots and GHB shots the last few days. Also having weakness in R leg intermittently.

## 2024-09-08 NOTE — ED PROVIDER NOTES
History     Chief Complaint   Patient presents with    Extremity Weakness     Bilaterali lower extremity due to GHB abuse     HPI  Maria Esther Haro is a 49 year old female who presents for bilateral lower extremity weakness and confusion.  The patient says that she has been using alcohol and GHB recreationally.  The last 2 nights when she is used them she is felt like her legs have stopped working and she cannot walk.  She does not have dizziness.  She says that she has double vision I cannot see clearly.  She reports tingling of her lower extremities    Allergies:  No Known Allergies    Problem List:    Patient Active Problem List    Diagnosis Date Noted    Vaginal discharge 12/04/2023     Priority: Medium    Dysuria 12/04/2023     Priority: Medium    Well woman exam 12/04/2023     Priority: Medium    Borderline personality disorder (H) 01/21/2019     Priority: Medium    Bipolar affective disorder, currently depressed, moderate (H) 01/21/2019     Priority: Medium    Substance use disorder 01/21/2019     Priority: Medium    HSV-2 (herpes simplex virus 2) infection 10/31/2018     Priority: Medium     Diagnosed at 20        Restless leg syndrome      Priority: Medium    Mild major depression (H)      Priority: Medium        Past Medical History:    Past Medical History:   Diagnosis Date    Alcohol abuse, in remission     ASCUS on Pap smear 5/16/11    Depressive disorder 1/1/1999    Depressive disorder, not elsewhere classified     Migraine     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, in remission (H)     Other, mixed, or unspecified nondependent drug abuse, in remission     Restless leg syndrome        Past Surgical History:    Past Surgical History:   Procedure Laterality Date    ABDOMEN SURGERY  06/22/2010    Csection    BREAST SURGERY  2007    Augmentation    COSMETIC SURGERY  09/18/2007    Breast surgery    ZZC ANESTH,SURG BREAST RECONSTRUCTIVE      Breast augmentation    ZZC NONSPECIFIC  PROCEDURE   &      x 3, previous tear in cervix       Family History:    Family History   Adopted: Yes   Problem Relation Age of Onset    No Known Problems Mother     No Known Problems Father     Skin Cancer Paternal Grandmother     Unknown/Adopted No family hx of         Pt is adopted    Macular Degeneration No family hx of     Glaucoma No family hx of     Diabetes No family hx of     Hypertension No family hx of        Social History:  Marital Status:  Single [1]  Social History     Tobacco Use    Smoking status: Every Day     Current packs/day: 1.50     Average packs/day: 1.5 packs/day for 27.3 years (40.9 ttl pk-yrs)     Types: Cigarettes     Start date: 1997    Smokeless tobacco: Current    Tobacco comments:     I'm using the vape   Vaping Use    Vaping status: Every Day    Substances: Nicotine    Devices: [employer] tank   Substance Use Topics    Alcohol use: No     Comment: none    Drug use: Yes     Types: Methamphetamines, Amphetamines, GHB     Comment: GHB all day        Medications:    buPROPion (WELLBUTRIN XL) 300 MG 24 hr tablet  hydrOXYzine (VISTARIL) 50 MG capsule  lamoTRIgine (LAMICTAL) 100 MG tablet  lamoTRIgine (LAMICTAL) 25 MG tablet  rOPINIRole (REQUIP) 0.5 MG tablet  spironolactone (ALDACTONE) 50 MG tablet  traZODone (DESYREL) 50 MG tablet          Review of Systems    Physical Exam   BP: (!) 167/109  Pulse: 115  Temp: 98.9  F (37.2  C)  Resp: 23  SpO2: 97 %      Physical Exam  Constitutional:       General: She is not in acute distress.     Appearance: She is well-developed.   HENT:      Head: Normocephalic and atraumatic.   Cardiovascular:      Rate and Rhythm: Normal rate.   Pulmonary:      Effort: No respiratory distress.      Breath sounds: No stridor.   Skin:     General: Skin is warm and dry.   Neurological:      Mental Status: She is alert.      GCS: GCS eye subscore is 4. GCS verbal subscore is 5. GCS motor subscore is 6.      Coordination: Finger-Nose-Finger Test  normal. Rapid alternating movements normal.      Gait: Gait normal.         ED Course        Procedures              Critical Care time:  none               Results for orders placed or performed during the hospital encounter of 09/08/24 (from the past 24 hour(s))   [facility] Draw    Narrative    The following orders were created for panel order [facility] Draw.  Procedure                               Abnormality         Status                     ---------                               -----------         ------                     Extra Red Top Tube[[id number]]                               Final result               Extra Green Top (Lithium...[[id number]]                      Final result               Extra Purple Top Tube[[id number]]                            Final result                 Please view results for these tests on the individual orders.   Extra Red Top Tube   Result Value Ref Range    Hold Specimen JIC    Extra Green Top (Lithium Heparin) Tube   Result Value Ref Range    Hold Specimen JIC    Extra Purple Top Tube   Result Value Ref Range    Hold Specimen JIC    Basic metabolic panel   Result Value Ref Range    Sodium 140 135 - 145 mmol/L    Potassium 3.7 3.4 - 5.3 mmol/L    Chloride 102 98 - 107 mmol/L    Carbon Dioxide (CO2) 27 22 - 29 mmol/L    Anion Gap 11 7 - 15 mmol/L    Urea Nitrogen 14.6 6.0 - 20.0 mg/dL    Creatinine 0.92 0.51 - 0.95 mg/dL    GFR Estimate 77 >60 mL/min/1.73m2    Calcium 9.5 8.8 - 10.4 mg/dL    Glucose 121 (H) 70 - 99 mg/dL   CBC with Platelets & Differential    Narrative    The following orders were created for panel order CBC with Platelets & Differential.  Procedure                               Abnormality         Status                     ---------                               -----------         ------                     CBC with platelets and d...[[id number]]  Abnormal            Final result                 Please view results for these tests on the individual orders.    Vitamin B12   Result Value Ref Range    Vitamin B12 1,686 (H) 232 - 1,245 pg/mL   CBC with platelets and differential   Result Value Ref Range    WBC Count 11.4 (H) 4.0 - 11.0 10e3/uL    RBC Count 4.65 3.80 - 5.20 10e6/uL    Hemoglobin 14.0 11.7 - 15.7 g/dL    Hematocrit 41.7 35.0 - 47.0 %    MCV 90 78 - 100 fL    MCH 30.1 26.5 - 33.0 pg    MCHC 33.6 31.5 - 36.5 g/dL    RDW 12.8 10.0 - 15.0 %    Platelet Count 342 150 - 450 10e3/uL    % Neutrophils 61 %    % Lymphocytes 25 %    % Monocytes 8 %    % Eosinophils 4 %    % Basophils 1 %    % Immature Granulocytes 0 %    NRBCs per 100 WBC 0 <1 /100    Absolute Neutrophils 7.0 1.6 - 8.3 10e3/uL    Absolute Lymphocytes 2.8 0.8 - 5.3 10e3/uL    Absolute Monocytes 0.9 0.0 - 1.3 10e3/uL    Absolute Eosinophils 0.5 0.0 - 0.7 10e3/uL    Absolute Basophils 0.1 0.0 - 0.2 10e3/uL    Absolute Immature Granulocytes 0.1 <=0.4 10e3/uL    Absolute NRBCs 0.0 10e3/uL   Folate   Result Value Ref Range    Folic Acid 29.3 4.6 - 34.8 ng/mL       Medications   lactated ringers BOLUS 1,000 mL (0 mLs Intravenous Stopped 9/8/24 0348)       Assessments & Plan (with Medical Decision Making)   49-year-old female presents for bilateral lower extremity weakness after drinking alcohol and using GHB.  I believe this is very likely related to her drug use.  She is able to get up and walk, no signs of stroke or central process.  Electrolytes are within normal limits.  Hemoglobin is normal.  Her B12 level is elevated.  Folic acid is normal.  She was observed in the emergency department for several hours and after clearing her intoxicants she is feeling better and is ambulating without difficulty.  She is asking for discharge and she is discharged with instructions to avoid drug use and return if worse, otherwise follow-up in clinic.  The patient is in agreement with this plan.    I have reviewed the nursing notes.    I have reviewed the findings, diagnosis, plan and need for follow up with the  patient.         Discharge Medication List as of 9/8/2024  3:48 AM          Final diagnoses:   Gamma-hydroxybutyrate (GHB) use disorder, severe (H)   Alcohol use disorder   Generalized weakness       9/8/2024   [facility] EMERGENCY DEPT       Tony Navarro MD  09/08/24 0618

## 2024-09-08 NOTE — DISCHARGE INSTRUCTIONS
I believe your symptoms are likely related to your alcohol and GHB use tonight.  If you want further help with drug use do not hesitate to come back for further resources to reach out to primary doctor.

## 2024-09-10 ENCOUNTER — HOSPITAL ENCOUNTER (EMERGENCY)
Facility: CLINIC | Age: 49
Discharge: HOME | End: 2024-09-10
Attending: FAMILY MEDICINE | Admitting: FAMILY MEDICINE
Payer: COMMERCIAL

## 2024-09-10 ENCOUNTER — TELEPHONE (OUTPATIENT)
Dept: FAMILY MEDICINE | Facility: CLINIC | Age: 49
End: 2024-09-10
Payer: COMMERCIAL

## 2024-09-10 ENCOUNTER — APPOINTMENT (OUTPATIENT)
Dept: CT IMAGING | Facility: CLINIC | Age: 49
End: 2024-09-10
Attending: FAMILY MEDICINE
Payer: COMMERCIAL

## 2024-09-10 VITALS
SYSTOLIC BLOOD PRESSURE: 119 MMHG | RESPIRATION RATE: 14 BRPM | BODY MASS INDEX: 26.63 KG/M2 | DIASTOLIC BLOOD PRESSURE: 84 MMHG | HEIGHT: 64 IN | HEART RATE: 82 BPM | OXYGEN SATURATION: 98 % | TEMPERATURE: 98.1 F | WEIGHT: 156 LBS

## 2024-09-10 DIAGNOSIS — K52.9 SIGMOIDITIS: ICD-10-CM

## 2024-09-10 DIAGNOSIS — K62.89 PROCTITIS: ICD-10-CM

## 2024-09-10 LAB
ALBUMIN SERPL BCG-MCNC: 3.8 G/DL (ref 3.5–5.2)
ALP SERPL-CCNC: 88 U/L (ref 40–150)
ALT SERPL W P-5'-P-CCNC: 10 U/L (ref 0–50)
ANION GAP SERPL CALCULATED.3IONS-SCNC: 8 MMOL/L (ref 7–15)
AST SERPL W P-5'-P-CCNC: 14 U/L (ref 0–45)
BASOPHILS # BLD AUTO: 0.1 10E3/UL (ref 0–0.2)
BASOPHILS NFR BLD AUTO: 1 %
BILIRUB SERPL-MCNC: 0.3 MG/DL
BUN SERPL-MCNC: 9.4 MG/DL (ref 6–20)
CALCIUM SERPL-MCNC: 8.5 MG/DL (ref 8.8–10.4)
CHLORIDE SERPL-SCNC: 106 MMOL/L (ref 98–107)
CREAT SERPL-MCNC: 0.76 MG/DL (ref 0.51–0.95)
EGFRCR SERPLBLD CKD-EPI 2021: >90 ML/MIN/1.73M2
EOSINOPHIL # BLD AUTO: 0.3 10E3/UL (ref 0–0.7)
EOSINOPHIL NFR BLD AUTO: 3 %
ERYTHROCYTE [DISTWIDTH] IN BLOOD BY AUTOMATED COUNT: 12.7 % (ref 10–15)
GLUCOSE SERPL-MCNC: 112 MG/DL (ref 70–99)
HCG UR QL: NEGATIVE
HCO3 SERPL-SCNC: 27 MMOL/L (ref 22–29)
HCT VFR BLD AUTO: 42 % (ref 35–47)
HGB BLD-MCNC: 14.1 G/DL (ref 11.7–15.7)
IMM GRANULOCYTES # BLD: 0.1 10E3/UL
IMM GRANULOCYTES NFR BLD: 1 %
LYMPHOCYTES # BLD AUTO: 1.9 10E3/UL (ref 0.8–5.3)
LYMPHOCYTES NFR BLD AUTO: 15 %
MCH RBC QN AUTO: 30.1 PG (ref 26.5–33)
MCHC RBC AUTO-ENTMCNC: 33.6 G/DL (ref 31.5–36.5)
MCV RBC AUTO: 90 FL (ref 78–100)
MONOCYTES # BLD AUTO: 1 10E3/UL (ref 0–1.3)
MONOCYTES NFR BLD AUTO: 8 %
NEUTROPHILS # BLD AUTO: 9.7 10E3/UL (ref 1.6–8.3)
NEUTROPHILS NFR BLD AUTO: 74 %
NONINV COLON CA DNA+OCC BLD SCRN STL QL: NORMAL
NRBC # BLD AUTO: 0 10E3/UL
NRBC BLD AUTO-RTO: 0 /100
PLATELET # BLD AUTO: 302 10E3/UL (ref 150–450)
POTASSIUM SERPL-SCNC: 3.4 MMOL/L (ref 3.4–5.3)
PROT SERPL-MCNC: 6.4 G/DL (ref 6.4–8.3)
RBC # BLD AUTO: 4.69 10E6/UL (ref 3.8–5.2)
SODIUM SERPL-SCNC: 141 MMOL/L (ref 135–145)
WBC # BLD AUTO: 13.1 10E3/UL (ref 4–11)

## 2024-09-10 PROCEDURE — 96361 HYDRATE IV INFUSION ADD-ON: CPT | Performed by: FAMILY MEDICINE

## 2024-09-10 PROCEDURE — 36415 COLL VENOUS BLD VENIPUNCTURE: CPT | Performed by: NURSE PRACTITIONER

## 2024-09-10 PROCEDURE — 250N000009 HC RX 250: Performed by: FAMILY MEDICINE

## 2024-09-10 PROCEDURE — 258N000003 HC RX IP 258 OP 636: Performed by: FAMILY MEDICINE

## 2024-09-10 PROCEDURE — 250N000011 HC RX IP 250 OP 636: Performed by: FAMILY MEDICINE

## 2024-09-10 PROCEDURE — 99284 EMERGENCY DEPT VISIT MOD MDM: CPT | Performed by: FAMILY MEDICINE

## 2024-09-10 PROCEDURE — 80053 COMPREHEN METABOLIC PANEL: CPT | Performed by: FAMILY MEDICINE

## 2024-09-10 PROCEDURE — 81025 URINE PREGNANCY TEST: CPT | Performed by: FAMILY MEDICINE

## 2024-09-10 PROCEDURE — 96374 THER/PROPH/DIAG INJ IV PUSH: CPT | Mod: 59 | Performed by: FAMILY MEDICINE

## 2024-09-10 PROCEDURE — 74177 CT ABD & PELVIS W/CONTRAST: CPT

## 2024-09-10 PROCEDURE — 99285 EMERGENCY DEPT VISIT HI MDM: CPT | Mod: 25 | Performed by: FAMILY MEDICINE

## 2024-09-10 PROCEDURE — 85048 AUTOMATED LEUKOCYTE COUNT: CPT | Performed by: NURSE PRACTITIONER

## 2024-09-10 RX ORDER — HYDROMORPHONE HYDROCHLORIDE 1 MG/ML
0.5 INJECTION, SOLUTION INTRAMUSCULAR; INTRAVENOUS; SUBCUTANEOUS
Status: DISCONTINUED | OUTPATIENT
Start: 2024-09-10 | End: 2024-09-10 | Stop reason: HOSPADM

## 2024-09-10 RX ORDER — IOPAMIDOL 755 MG/ML
76 INJECTION, SOLUTION INTRAVASCULAR ONCE
Status: COMPLETED | OUTPATIENT
Start: 2024-09-10 | End: 2024-09-10

## 2024-09-10 RX ORDER — ONDANSETRON 2 MG/ML
4 INJECTION INTRAMUSCULAR; INTRAVENOUS ONCE
Status: COMPLETED | OUTPATIENT
Start: 2024-09-10 | End: 2024-09-10

## 2024-09-10 RX ADMIN — IOPAMIDOL 76 ML: 755 INJECTION, SOLUTION INTRAVENOUS at 15:07

## 2024-09-10 RX ADMIN — SODIUM CHLORIDE 59 ML: 9 INJECTION, SOLUTION INTRAVENOUS at 15:07

## 2024-09-10 RX ADMIN — SODIUM CHLORIDE 1000 ML: 9 INJECTION, SOLUTION INTRAVENOUS at 13:29

## 2024-09-10 ASSESSMENT — ACTIVITIES OF DAILY LIVING (ADL)
ADLS_ACUITY_SCORE: 35

## 2024-09-10 ASSESSMENT — COLUMBIA-SUICIDE SEVERITY RATING SCALE - C-SSRS
2. HAVE YOU ACTUALLY HAD ANY THOUGHTS OF KILLING YOURSELF IN THE PAST MONTH?: NO
1. IN THE PAST MONTH, HAVE YOU WISHED YOU WERE DEAD OR WISHED YOU COULD GO TO SLEEP AND NOT WAKE UP?: NO
6. HAVE YOU EVER DONE ANYTHING, STARTED TO DO ANYTHING, OR PREPARED TO DO ANYTHING TO END YOUR LIFE?: NO

## 2024-09-10 NOTE — ED TRIAGE NOTES
Patient reports she normally puts hydrogen peroxide in her vagina but accidentally put it in her rectum via syringe instead. Patient reports since that happened yesterday she has had rectal bleeding with clots.     Triage Assessment (Adult)       Row Name 09/10/24 1222          Triage Assessment    Airway WDL WDL        Respiratory WDL    Respiratory WDL WDL        Skin Circulation/Temperature WDL    Skin Circulation/Temperature WDL WDL        Cardiac WDL    Cardiac WDL WDL        Peripheral/Neurovascular WDL    Peripheral Neurovascular WDL WDL        Cognitive/Neuro/Behavioral WDL    Cognitive/Neuro/Behavioral WDL WDL

## 2024-09-10 NOTE — ED NOTES
Lab attempt x 2 and was unsuccessful.  Lab called and will draw Chemistry. Pt with no complaints, she states she has been sleeping.

## 2024-09-10 NOTE — ED NOTES
Pt continues to rest/sleep.  Lights out in room.  Pt has been updated by MD as well, and is aware we are awaiting GI consult call.  Pt offers no complaints.  PLAN:  Will await further orders and disposition.

## 2024-09-10 NOTE — ED PROVIDER NOTES
"  History     Chief Complaint   Patient presents with    Rectal Bleeding     HPI  Maria Esther Haro is a 49 year old female, past medical history is significant for borderline personality, bipolar affective disorder, substance use disorder, restless leg syndrome, mild major depression, who presents to the emergency department with concerns of rectal bleeding after she \"accidentally\" injected hydrogen peroxide into her anus by mistake having intended to inject into her vagina.  History is obtained from the patient who states that she regularly injects her vagina with hydrogen peroxide to keep it clean and healthy.  Yesterday morning the patient injected about 5 to 10 cc by her description of hydrogen peroxide into her rectum by mistake.  She did not immediately try to pop it out.  Somewhat later in the day perhaps earlier this morning she developed more rectal bleeding and then subsequently clots today and so was directed to the emergency department.  She notes mild nausea and anorexia.  Bilateral lower quadrant and suprapubic abdominal pain.  Denies any fever chills or sweats.  The patient denies any other type of anal stimulation recently.    Allergies:  No Known Allergies    Problem List:    Patient Active Problem List    Diagnosis Date Noted    Vaginal discharge 12/04/2023     Priority: Medium    Dysuria 12/04/2023     Priority: Medium    Well woman exam 12/04/2023     Priority: Medium    Borderline personality disorder (H) 01/21/2019     Priority: Medium    Bipolar affective disorder, currently depressed, moderate (H) 01/21/2019     Priority: Medium    Substance use disorder 01/21/2019     Priority: Medium    HSV-2 (herpes simplex virus 2) infection 10/31/2018     Priority: Medium     Diagnosed at 20        Restless leg syndrome      Priority: Medium    Mild major depression (H)      Priority: Medium        Past Medical History:    Past Medical History:   Diagnosis Date    Alcohol abuse, in remission     ASCUS on " Pap smear 11    Depressive disorder 1999    Depressive disorder, not elsewhere classified     Migraine     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, in remission (H)     Other, mixed, or unspecified nondependent drug abuse, in remission     Restless leg syndrome        Past Surgical History:    Past Surgical History:   Procedure Laterality Date    ABDOMEN SURGERY  2010    Csection    BREAST SURGERY  2007    Augmentation    COSMETIC SURGERY  2007    Breast surgery    ZZC ANESTH,SURG BREAST RECONSTRUCTIVE      Breast augmentation    Z NONSPECIFIC PROCEDURE   &      x 3, previous tear in cervix       Family History:    Family History   Adopted: Yes   Problem Relation Age of Onset    No Known Problems Mother     No Known Problems Father     Skin Cancer Paternal Grandmother     Unknown/Adopted No family hx of         Pt is adopted    Macular Degeneration No family hx of     Glaucoma No family hx of     Diabetes No family hx of     Hypertension No family hx of        Social History:  Marital Status:  Single [1]  Social History     Tobacco Use    Smoking status: Every Day     Current packs/day: 1.50     Average packs/day: 1.5 packs/day for 27.3 years (40.9 ttl pk-yrs)     Types: Cigarettes     Start date: 1997    Smokeless tobacco: Current    Tobacco comments:     I'm using the vape   Vaping Use    Vaping status: Every Day    Substances: Nicotine    Devices: [employer]   Substance Use Topics    Alcohol use: No     Comment: none    Drug use: Yes     Types: Methamphetamines, Amphetamines, GHB     Comment: GHB all day        Medications:    buPROPion (WELLBUTRIN XL) 300 MG 24 hr tablet  hydrOXYzine (VISTARIL) 50 MG capsule  lamoTRIgine (LAMICTAL) 100 MG tablet  lamoTRIgine (LAMICTAL) 25 MG tablet  rOPINIRole (REQUIP) 0.5 MG tablet  spironolactone (ALDACTONE) 50 MG tablet  traZODone (DESYREL) 50 MG tablet          Review of Systems   All other systems  "reviewed and are negative.      Physical Exam   BP: (!) 139/98  Pulse: 108  Temp: 98.1  F (36.7  C)  Resp: 18  Height: 162.6 cm (5' 4\")  Weight: 70.8 kg (156 lb)  SpO2: 95 %      Physical Exam  Vitals and nursing note reviewed.   Constitutional:       General: She is not in acute distress.     Appearance: Normal appearance. She is normal weight. She is not ill-appearing.   HENT:      Head: Normocephalic and atraumatic.      Right Ear: Tympanic membrane, ear canal and external ear normal.      Left Ear: Tympanic membrane, ear canal and external ear normal.      Nose: Nose normal.      Mouth/Throat:      Mouth: Mucous membranes are dry.      Pharynx: Oropharynx is clear.   Eyes:      Extraocular Movements: Extraocular movements intact.      Conjunctiva/sclera: Conjunctivae normal.      Pupils: Pupils are equal, round, and reactive to light.   Cardiovascular:      Rate and Rhythm: Normal rate and regular rhythm.      Pulses: Normal pulses.      Heart sounds: Normal heart sounds.   Pulmonary:      Effort: Pulmonary effort is normal.      Breath sounds: Normal breath sounds.   Abdominal:      Palpations: Abdomen is soft.      Comments: Bowel sounds decreased   Genitourinary:     Comments: With the patient's nurse in the room I placed her in a left lateral decubitus position needs to chest i.e. fetal position and inspected the rectum.  There was gross blood and 1 external hemorrhoid nonthrombosed.  I did not insert my finger into her rectum as my concern with a corrosive agent such as hydroperoxide I could potentially perforate the rectum with a rectal exam.  Musculoskeletal:         General: Normal range of motion.      Cervical back: Normal range of motion and neck supple.   Skin:     General: Skin is warm and dry.      Capillary Refill: Capillary refill takes less than 2 seconds.   Neurological:      General: No focal deficit present.      Mental Status: She is alert and oriented to person, place, and time.         ED " Course        Procedures  1:09 PM  Shortly after seen and examined this patient I spoke with Jeyson at poison control who shared my concerns with respect to the hydroperoxide effect on the rectal mucosa and the potential, albeit quite low, for perforation with sufficient irritation to the rectal mucosa.  Agree with plan for workup.  They will follow.                Results for orders placed or performed during the hospital encounter of 09/10/24 (from the past 24 hour(s))   CBC with platelets differential    Narrative    The following orders were created for panel order CBC with platelets differential.  Procedure                               Abnormality         Status                     ---------                               -----------         ------                     CBC with platelets and d...[[id number]]  Abnormal            Final result                 Please view results for these tests on the individual orders.   Comprehensive metabolic panel   Result Value Ref Range    Sodium 141 135 - 145 mmol/L    Potassium 3.4 3.4 - 5.3 mmol/L    Carbon Dioxide (CO2) 27 22 - 29 mmol/L    Anion Gap 8 7 - 15 mmol/L    Urea Nitrogen 9.4 6.0 - 20.0 mg/dL    Creatinine 0.76 0.51 - 0.95 mg/dL    GFR Estimate >90 >60 mL/min/1.73m2    Calcium 8.5 (L) 8.8 - 10.4 mg/dL    Chloride 106 98 - 107 mmol/L    Glucose 112 (H) 70 - 99 mg/dL    Alkaline Phosphatase 88 40 - 150 U/L    AST 14 0 - 45 U/L    ALT 10 0 - 50 U/L    Protein Total 6.4 6.4 - 8.3 g/dL    Albumin 3.8 3.5 - 5.2 g/dL    Bilirubin Total 0.3 <=1.2 mg/dL   CBC with platelets and differential   Result Value Ref Range    WBC Count 13.1 (H) 4.0 - 11.0 10e3/uL    RBC Count 4.69 3.80 - 5.20 10e6/uL    Hemoglobin 14.1 11.7 - 15.7 g/dL    Hematocrit 42.0 35.0 - 47.0 %    MCV 90 78 - 100 fL    MCH 30.1 26.5 - 33.0 pg    MCHC 33.6 31.5 - 36.5 g/dL    RDW 12.7 10.0 - 15.0 %    Platelet Count 302 150 - 450 10e3/uL    % Neutrophils 74 %    % Lymphocytes 15 %    % Monocytes 8 %    %  Eosinophils 3 %    % Basophils 1 %    % Immature Granulocytes 1 %    NRBCs per 100 WBC 0 <1 /100    Absolute Neutrophils 9.7 (H) 1.6 - 8.3 10e3/uL    Absolute Lymphocytes 1.9 0.8 - 5.3 10e3/uL    Absolute Monocytes 1.0 0.0 - 1.3 10e3/uL    Absolute Eosinophils 0.3 0.0 - 0.7 10e3/uL    Absolute Basophils 0.1 0.0 - 0.2 10e3/uL    Absolute Immature Granulocytes 0.1 <=0.4 10e3/uL    Absolute NRBCs 0.0 10e3/uL   CT Abdomen Pelvis w Contrast    Narrative    CT ABDOMEN AND PELVIS WITH CONTRAST 9/10/2024 3:19 PM    CLINICAL HISTORY: Abdominal pain. Bilateral quadrant lower abdominal  pain, perianal pain, injected rectum accidentally with hydrogen  peroxide 24 hours ago now with profuse bleeding and clots per rectum.    TECHNIQUE: CT scan of the abdomen and pelvis was performed following  injection of IV contrast. Multiplanar reformats were obtained. Dose  reduction techniques were used.  CONTRAST: 59 mL Isovue 370    COMPARISON: July 11, 2016    FINDINGS:   LOWER CHEST: No infiltrates or effusions.    HEPATOBILIARY: No significant mass or bile duct dilatation. No  calcified gallstones.     PANCREAS: No significant mass, duct dilatation, or inflammatory  change.    SPLEEN: Normal size.    ADRENAL GLANDS: No significant nodules.    KIDNEYS/BLADDER: No significant mass, stones, or hydronephrosis.    BOWEL: Inflammation of the rectum and distal sigmoid noted. No free  intraperitoneal air. No abscess. No obstruction.    PELVIC ORGANS: No pelvic masses.    ADDITIONAL FINDINGS: No ascites.    MUSCULOSKELETAL: No frankly destructive bony lesions.      Impression    IMPRESSION: Proctitis and distal sigmoid colitis without complication.    CHRISTINE MCDONALD MD         SYSTEM ID:  [id number]   HCG qualitative urine   Result Value Ref Range    hCG Urine Qualitative Negative Negative   4:16 PM  Gastroenterology paged for phone consult.    4:58 PM  No callback from gastroenterology yet.  Paged again.    5:51 PM  I received a call back from  Dr. Prem Mitchell gastroenterology.  I discussed the patient with him.  He agreed that this was a relatively unusual circumstance.  He suggested that we could observe the patient overnight and if feeling better discharged home in the morning.  He also suggested that we could obtain C. difficile and stool enteric pathogens.  The patient has been hemodynamically stable throughout the entire course of her emergency room visit.  Her hemoglobin is normal range.  Her white count elevation is most likely stress leukocytosis at this point.  I discussed all of the above with the patient.  Specifically discussed recommendations for observation overnight.  She would prefer to go home with instructions for bland diet and clear fluids.  She understands that she will in all likelihood given the CT findings continue to have some rectal bleeding which should slow down with appropriate diet and good hydration over the next few days.  If this does not occur or if things are worsening in any way she will return to the emergency department.            Medications   HYDROmorphone (PF) (DILAUDID) injection 0.5 mg (has no administration in time range)   sodium chloride 0.9% BOLUS 1,000 mL (0 mLs Intravenous Stopped 9/10/24 1454)   ondansetron (ZOFRAN) injection 4 mg (4 mg Intravenous Not Given 9/10/24 1454)   iopamidol (ISOVUE-370) solution 76 mL (76 mLs Intravenous $Given 9/10/24 1507)   sodium chloride 0.9 % bag 500mL for CT scan flush use (59 mLs Intravenous $Given 9/10/24 1507)       Assessments & Plan (with Medical Decision Making)   Assessment and plan with medical decision making at the time stamp above.    Disclaimer: This note consists of symbols derived from keyboarding, dictation and/or voice recognition software. As a result, there may be errors in the script that have gone undetected. Please consider this when interpreting information found in this chart.      I have reviewed the nursing notes.    I have reviewed the  findings, diagnosis, plan and need for follow up with the patient.        New Prescriptions    No medications on file       Final diagnoses:   Proctitis   Sigmoiditis       9/10/2024   [facility] EMERGENCY DEPT       Dar Pineda MD  09/10/24 8422

## 2024-09-10 NOTE — DISCHARGE INSTRUCTIONS
Push fluids, rest.  Do not put hydrogen peroxide in your anus again; this is very irritating/corrosive to the rectal mucosa and can potentially cause perforation of the bowel and make you very sick.  Fortunately this is not the case currently.  Your white cell count was mildly elevated and your imaging shows proctitis and sigmoid colitis.  This should settle down with a bland diet and clear fluids as well as acetaminophen as needed for pain.  I discussed your presentation and concerns with on-call gastroenterology as we discussed prior to your disposition to home.  If you have persistent symptoms that are not improving over the course the next 5 to 7 days please follow-up in clinic with your primary care provider or return to the emergency department.

## 2024-09-10 NOTE — ED NOTES
Poison Controlled called and has been updated.  No results from CT scan back yet.  They will call back again for update.

## 2024-09-10 NOTE — ED NOTES
"Assumed care of pt at 1315, report received from Keli TANG.   Discussed calling Poison Control with MD Pineda and he confirms he has already.  Orders received.  20g IV started, blood difficult to draw due to dehydration.  CBC sent, will need to re-attempt for Chemistry.   Pt aware we need UA, and plan for CT.       Pt with multiple bruising over legs and arms, pt reports she is safe and is not being abused.  She admits to being seen for \"Vertigo\" after drinking and was seen here the other day, and had multiple IV starts.     PLAN:  Will try for blood draw or call Lab if needed.  Pt denies need for zofran or dilaudid at this time.   "

## 2024-09-10 NOTE — ED NOTES
Lab successful with lab draw after 2 attempts.  Will await results and then pt to CT Scan.  Pt continues to rest/sleep, offers no complaints.

## 2024-09-10 NOTE — TELEPHONE ENCOUNTER
"Patient calling stating she has ongoing issue with \"inserting hydrogen peroxide into her asshole and has been having a lot of bleeding due to this\". Advised patient she needs to be seen in the ER, pt is agreeable to plan and will head there now and has a  with her to take her.    Thanks!  Zak ROQUE RN   [facility]    "

## 2024-09-11 LAB — VIT B1 PYROPHOSHATE BLD-SCNC: 170 NMOL/L

## 2024-09-12 ENCOUNTER — PATIENT OUTREACH (OUTPATIENT)
Dept: CARE COORDINATION | Facility: CLINIC | Age: 49
End: 2024-09-12
Payer: COMMERCIAL

## 2024-09-12 NOTE — PROGRESS NOTES
"Clinic SW Care Coordination Contact  Follow Up Progress Note      Assessment: MAULIK CC spoke with pt.  Calling as a 30 day follow up , but chart review indicated  she has been seen in the ED at [place] twice.  Pt .was vague about ED visit,  \" one was embarrassing and one was medication. \"No mention of GHB use and only minimal alcohol use.  She does see a therapist at [facility] every Friday, and is seeing a Psychiatrist for medication. Stated she has all crisis numbers.  Spoke of grief over losing brother and boyfriend ,during the last 2 years . This does not allow her to work . No work since 2013.Has not yet applied for disability says doesn't know how. Number for disability [employer] and [employer] provided.  Maria Esther was very vague about her housing situation. Left aunt's house, No reason given.   No real place to stay.  Discussed going to a shelter. She said she would consider.  Provided her with the [facility] number. She indicated she could call .     Care Gaps:    Health Maintenance Due   Topic Date Due    COLORECTAL CANCER SCREENING  Never done    HEPATITIS A IMMUNIZATION (2 of 3 - Hep A Twinrix risk 3-dose series) 09/25/2009    HEPATITIS B IMMUNIZATION (2 of 3 - Hep B Twinrix 3-dose series) 09/25/2009    DTAP/TDAP/TD IMMUNIZATION (2 - Tdap) 07/16/2019    Pneumococcal Vaccine: Pediatrics (0 to 5 Years) and At-Risk Patients (6 to 64 Years) (2 of 2 - PCV) 03/11/2021    NICOTINE/TOBACCO CESSATION COUNSELING Q 1 YR  11/07/2023    INFLUENZA VACCINE (1) 09/01/2024    COVID-19 Vaccine (3 - 2023-24 season) 09/01/2024    HPV TEST  12/10/2024    PAP  12/10/2024           Care Plans  Care Plan: Financial Wellbeing       Problem: Patient expresses financial resource strain       Goal: Create an action plan to increase financial stability       Start Date: 7/30/2024 Expected End Date: 11/1/2024    This Visit's Progress: 10%    Priority: High    Note:     Barriers: mental health causing financial " instability  Strengths: connected to care coordination  Patient expressed understanding of goal: yes  Action steps to achieve this goal:  1. I will explore the process for SSDI application and consider using Disability Specialists for help with this  2. I will work with FRW to be screened for county benefits  3. I will remain in communication with [facility] about additional resource needed or barriers I encounter                             Care Plan: General       Problem: HP GENERAL PROBLEM housing       Onset Date: 9/12/2024    Note:     I need housing   Barriers: stress   Strengths: able to make call  Patient expressed understanding of goal:   Action steps to achieve this goal:  1. I will call the number provided for shelters  2. I will find safe place to stay,   3. I will stay in touch with SW CC         Goal: General Goal - please update text                             Intervention/Education provided during outreach: Introduction, review of goals. Provided numbers for shelter and Disability assistance             Plan: Pt will call for a shelter, and for assistance in applying for Disability. Will call Lead SW CC with any questions.    Care Coordinator will follow up in 2 weeks .       CRISTIN Fox / [facility]   Care Coordination  [facility]  9/12/2024 11:55 AM

## 2024-10-01 DIAGNOSIS — F31.32 BIPOLAR AFFECTIVE DISORDER, CURRENTLY DEPRESSED, MODERATE (H): ICD-10-CM

## 2024-10-02 RX ORDER — LAMOTRIGINE 25 MG/1
TABLET ORAL
Qty: 180 TABLET | Refills: 0 | OUTPATIENT
Start: 2024-10-02

## 2024-10-02 NOTE — TELEPHONE ENCOUNTER
Medication requested:    lamoTRIgine (LAMICTAL) 25 MG tablet 180 tablet 0 8/26/2024 -- No   Sig: Take 50 mg (two tablets of 25 mg) in addition to 300 mg (for a total of 350 mg daily) for two weeks. If you notice some improvement, and no side effects as noted in your AVS, then proceed to take an additional two tablets for a total of 100 mg (four tablets of 25 mg) in addition to 300 mg (for a total of 400 mg daily).       Requested too soon, refused

## 2024-10-07 ENCOUNTER — OFFICE VISIT (OUTPATIENT)
Dept: PSYCHIATRY | Facility: CLINIC | Age: 49
End: 2024-10-07
Attending: PSYCHIATRY & NEUROLOGY
Payer: COMMERCIAL

## 2024-10-07 VITALS
BODY MASS INDEX: 26.71 KG/M2 | SYSTOLIC BLOOD PRESSURE: 117 MMHG | DIASTOLIC BLOOD PRESSURE: 83 MMHG | HEART RATE: 96 BPM | WEIGHT: 155.6 LBS

## 2024-10-07 DIAGNOSIS — F19.90 SUBSTANCE USE DISORDER: ICD-10-CM

## 2024-10-07 DIAGNOSIS — F41.0 PANIC ATTACK: ICD-10-CM

## 2024-10-07 DIAGNOSIS — F31.32 BIPOLAR AFFECTIVE DISORDER, CURRENTLY DEPRESSED, MODERATE (H): Primary | ICD-10-CM

## 2024-10-07 DIAGNOSIS — F90.9 ATTENTION DEFICIT HYPERACTIVITY DISORDER (ADHD), UNSPECIFIED ADHD TYPE: ICD-10-CM

## 2024-10-07 DIAGNOSIS — F60.3 BORDERLINE PERSONALITY DISORDER (H): ICD-10-CM

## 2024-10-07 PROCEDURE — G0463 HOSPITAL OUTPT CLINIC VISIT: HCPCS | Performed by: PSYCHIATRY & NEUROLOGY

## 2024-10-07 PROCEDURE — 99214 OFFICE O/P EST MOD 30 MIN: CPT | Performed by: PSYCHIATRY & NEUROLOGY

## 2024-10-07 RX ORDER — ATOMOXETINE 10 MG/1
10 CAPSULE ORAL DAILY
Qty: 30 CAPSULE | Refills: 0 | Status: SHIPPED | OUTPATIENT
Start: 2024-10-07 | End: 2024-11-12

## 2024-10-07 ASSESSMENT — PATIENT HEALTH QUESTIONNAIRE - PHQ9
10. IF YOU CHECKED OFF ANY PROBLEMS, HOW DIFFICULT HAVE THESE PROBLEMS MADE IT FOR YOU TO DO YOUR WORK, TAKE CARE OF THINGS AT HOME, OR GET ALONG WITH OTHER PEOPLE: SOMEWHAT DIFFICULT
SUM OF ALL RESPONSES TO PHQ QUESTIONS 1-9: 10
SUM OF ALL RESPONSES TO PHQ QUESTIONS 1-9: 10

## 2024-10-07 ASSESSMENT — PAIN SCALES - GENERAL: PAINLEVEL: NO PAIN (0)
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
## 2024-10-07 NOTE — PATIENT INSTRUCTIONS
It was a pleasure working with you today, Maria Esther.  This is today's plan:  Take hydroxyzine 50mg three times daily regularly instead of as needed  Restart Strattera at a low dose - 10mg daily  Continue lamotrigine at 350mg daily  Continue in psychotherapy    **For crisis resources, please see the information at the end of this document**   Patient Education    Thank you for coming to the [facility] [place] [facility].     Lab Testing:  If you had lab testing today and your results are reassuring or normal they will be mailed to you or sent through [employer]2ebanking within 7 days. If the lab tests need quick action we will call you with the results. The phone number we will call with results is # [phone]. If this is not the best number please call our clinic and change the number.     Medication Refills:  If you need any refills please call your pharmacy and they will contact us. Our fax number for refills is [phone].   Three business days of notice are needed for general medication refill requests.   Five business days of notice are needed for controlled substance refill requests.   If you need to change to a different pharmacy, please contact the new pharmacy directly. The new pharmacy will help you get your medications transferred.     Contact Us:  Please call [phone] during business hours (8-5:00 M-F).   If you have medication related questions after clinic hours, or on the weekend, please call [phone].     Financial Assistance [phone]   Medical Records [phone]       MENTAL HEALTH CRISIS RESOURCES:  For a emergency help, please call 911 or go to the nearest Emergency Department.     Emergency Walk-In Options:   EmPATH Unit @ [facility] ([place]): [phone] - Specialized mental health emergency area designed to be calming  [facility] ([place]): [phone]  [facility] ([place]): [phone]  [place]  [facility]): [phone]    [facility] Crisis Information:   [place]: [phone]  [place]: [phone]  [place]) - Adult: [phone]     Child: [phone]  [place] - Adult: [phone]     Child: [phone]  [place]: [phone]  List of all [facility] resources:   https://[place].[facility]/dhs/people-we-serve/adults/health-care/mental-health/resources/crisis-contacts.jsp    National Crisis Information:   Crisis Text Line: Text  [place]  to 497474  Suicide & Crisis Lifeline: 988  National Suicide Prevention Lifeline: 6-209-512-XRFT (1-925.354.3487)       For online chat options, visit https://suicidepreventionlifeline.org/chat/  Poison Control Center: 1-734.533.6762  Trans Lifeline: 1-799.653.2018 - Hotline for transgender people of all ages  The [employer] Project: 3-584-378-2503 - Hotline for LGBT youth     For Non-Emergency Support:   Fast Tracker: Mental Health & Substance Use Disorder Resources -   https://www."[employer]n.org/

## 2024-10-21 DIAGNOSIS — F33.0 MILD EPISODE OF RECURRENT MAJOR DEPRESSIVE DISORDER (H): ICD-10-CM

## 2024-10-21 RX ORDER — BUPROPION HYDROCHLORIDE 300 MG/1
300 TABLET ORAL EVERY MORNING
Qty: 90 TABLET | Refills: 0 | Status: SHIPPED | OUTPATIENT
Start: 2024-10-21

## 2024-10-22 ENCOUNTER — PATIENT OUTREACH (OUTPATIENT)
Dept: CARE COORDINATION | Facility: CLINIC | Age: 49
End: 2024-10-22
Payer: COMMERCIAL

## 2024-10-22 NOTE — PROGRESS NOTES
Clinic Care Coordination Contact  [facility]/Voicemail    Clinical Data: Care Coordinator Outreach    Outreach Documentation Number of Outreach Attempt   10/22/2024   2:36 PM 1     Mailbox is full; unable to leave a VM     Plan: Care Coordinator will try to reach patient again in 10 business days.    Lucy Ray  Community Health Worker  [facility]  [phone]

## 2024-10-26 ENCOUNTER — MYC REFILL (OUTPATIENT)
Dept: FAMILY MEDICINE | Facility: CLINIC | Age: 49
End: 2024-10-26
Payer: COMMERCIAL

## 2024-10-26 DIAGNOSIS — F31.32 BIPOLAR AFFECTIVE DISORDER, CURRENTLY DEPRESSED, MODERATE (H): ICD-10-CM

## 2024-10-28 RX ORDER — TRAZODONE HYDROCHLORIDE 50 MG/1
50-100 TABLET, FILM COATED ORAL AT BEDTIME
Qty: 60 TABLET | Refills: 1 | Status: SHIPPED | OUTPATIENT
Start: 2024-10-28

## 2024-10-30 ENCOUNTER — PATIENT OUTREACH (OUTPATIENT)
Dept: CARE COORDINATION | Facility: CLINIC | Age: 49
End: 2024-10-30
Payer: COMMERCIAL

## 2024-10-30 NOTE — PROGRESS NOTES
Clinic Care Coordination Contact  Care Coordination Clinician Chart Review    Situation: Patient chart reviewed by Care Coordinator.       Background: Care Coordination Program started: 7/24/2024. Initial assessment completed and patient-centered care plan(s) were developed with participation from patient. Lead CC handed patient off to CHW for continued outreaches.       Assessment: Per chart review, patient outreach attempted by CC CHW on 10/22.  Patient is actively working to accomplish goal(s). Patient's goal(s) appropriate and relevant at this time. Patient is not due for updated Plan of Care.  Assessments will be completed annually or as needed/with change of patient status.      Care Plan: Financial Wellbeing       Problem: Patient expresses financial resource strain       Goal: Create an action plan to increase financial stability       Start Date: 7/30/2024 Expected End Date: 11/1/2024    This Visit's Progress: 10%    Priority: High    Note:     Barriers: mental health causing financial instability  Strengths: connected to care coordination  Patient expressed understanding of goal: yes  Action steps to achieve this goal:  1. I will explore the process for SSDI application and consider using Disability Specialists for help with this  2. I will work with FRW to be screened for county benefits  3. I will remain in communication with [facility] about additional resource needed or barriers I encounter                             Care Plan: General       Problem: HP GENERAL PROBLEM housing       Onset Date: 9/12/2024    Note:     I need housing   Barriers: stress   Strengths: able to make call  Patient expressed understanding of goal:   Action steps to achieve this goal:  1. I will call the number provided for shelters  2. I will find safe place to stay,   3. I will stay in touch with [facility]         Goal: General Goal - please update text                                Plan/Recommendations: The patient will continue  working with Care Coordination to achieve goal(s) as above. CHW will continue outreaches at minimum every 30 days and will involve Lead CC as needed or if patient is ready to move to Maintenance. Lead CC will continue to monitor CHW outreaches and patient's progress to goal(s) every 6 weeks.     Plan of Care updated and sent to patient: Yes, via [facility]  [facility], [facility]  Phone: [phone]  E-mail: [email or web address]

## 2024-10-30 NOTE — LETTER
[facility]  Patient Centered Plan of Care  About Me:        Patient Name:  Maria Esther Haro    YOB: 1975  Age:         49 year old   Domenic MRN:    [id number] Telephone Information:  Home Phone [phone]   Mobile [phone]       Address:  [address] [place] [facility] [address] Email address:  [email or web address]      Emergency Contact(s)    Name Relationship Lgl Grd Work Phone Home Phone Mobile Phone   1. LINDA CUMMINS Significant ot*    [phone]           Primary language:  English     needed? No   [employer]:  [phone] op. 1  Other communication barriers:None    Preferred Method of Communication:  Adrienne  Current living arrangement: Other    Mobility Status/ Medical Equipment: Independent        Health Maintenance  Health Maintenance Reviewed: No data recorded    My Access Plan  Medical Emergency 911   Primary Clinic Line [facility] [phone]   24 Hour Appointment Line [phone] or  [phone]) (toll-free)   24 Hour Nurse Line [phone] (toll-free)   Preferred Urgent Care [facility], [phone]     Preferred Hospital [facility]  [phone]     Preferred Pharmacy [id number] [address] AT [facility] [address]     Behavioral Health Crisis Line The National Suicide Prevention Lifeline at 1-667.503.4672 or Text/Call 438           My Care Team Members  Patient Care Team         Relationship Specialty Notifications Start End    Hyun Beltran PA-C PCP - General Family Medicine  6/10/21     Referring to Dermatology    Phone: [phone] Fax: [phone] [address] [place] [address]    Jodie Leavitt Chi, OD MD Optometry  1/8/19     Phone: [phone] Fax: [phone]         [address] [facility] [address]    Sneha Maxwell Personal Advocate & Liaison  (PAL) Family Medicine Admissions 11/10/22     Clement Patrick MD Resident  Admissions 10/25/23     Phone: [phone] Fax: [phone]         [address] [facility] [address]  [facility] [address]    Hyun Galeana, PharmD Pharmacist Pharmacist  11/1/23     Phone: [phone] Fax: [phone]         [address] [facility] [address]    Breanna Coyne APRN CNP Nurse Practitioner OB/Gyn  12/8/23     Phone: [phone] Fax: [phone]         [facility] [address] [facility] [address]    Breanna Coyne APRN CNP Assigned OBGYN Provider   12/23/23     Phone: [phone] Fax: [phone]         [facility] [address] [facility] [address]    Hyun Beltran PA-C Assigned PCP   3/23/24     Phone: [phone] Fax: [phone]         [address] [facility] [address]    Leslie Morfin LICSW Lead Care Coordinator  Admissions 7/25/24     Phone: [phone]         Lucy Ray CHW Community Health Worker  Admissions 8/1/24     Phone: [phone]         Ann Oconnor DO Assigned Behavioral Health Provider   10/23/24     Phone: [phone] Fax: [phone]         [address] [facility] [address]                My Care Plans  Self Management and Treatment Plan    Care Plan  Care Plan: Financial Wellbeing       Problem: Patient expresses financial resource strain       Goal: Create an action plan to increase financial stability       Start Date: 7/30/2024 Expected End Date: 11/1/2024    This Visit's Progress: 10%    Priority: High    Note:     Barriers: mental health causing financial instability  Strengths: connected to care coordination  Patient expressed understanding of goal: yes  Action steps to achieve this goal:  1. I will explore the process for SSDI application and consider using [employer] for help with this  2. I will work with FRW to be screened for county benefits  3. I will  remain in communication with [facility] about additional resource needed or barriers I encounter                             Care Plan: General       Problem: HP GENERAL PROBLEM housing       Onset Date: 9/12/2024    Note:     I need housing   Barriers: stress   Strengths: able to make call  Patient expressed understanding of goal:   Action steps to achieve this goal:  1. I will call the number provided for shelters  2. I will find safe place to stay,   3. I will stay in touch with [facility]         Goal: General Goal - please update text                             Action Plans on File:                       Advance Care Plans/Directives:   Advanced Care Plan/Directives on file: No data recorded  Discussed with patient/caregiver(s): No data recorded             My Medical and Care Information  Problem List   Patient Active Problem List   Diagnosis    Restless leg syndrome    HSV-2 (herpes simplex virus 2) infection    Borderline personality disorder (H)    Bipolar affective disorder, currently depressed, moderate (H)    Substance use disorder    Vaginal discharge    Dysuria    Well woman exam    Panic attack      Current Medications:  Please refer to the most recent medication list provided to you by your medical team and reach out to your provider with any questions or to make any corrections.    Care Coordination Start Date: 7/24/2024   Frequency of Care Coordination: monthly, more frequently as needed     Form Last Updated: 10/30/2024

## 2024-11-11 DIAGNOSIS — F90.9 ATTENTION DEFICIT HYPERACTIVITY DISORDER (ADHD), UNSPECIFIED ADHD TYPE: ICD-10-CM

## 2024-11-12 ENCOUNTER — PATIENT OUTREACH (OUTPATIENT)
Dept: CARE COORDINATION | Facility: CLINIC | Age: 49
End: 2024-11-12
Payer: COMMERCIAL

## 2024-11-12 RX ORDER — ATOMOXETINE 10 MG/1
10 CAPSULE ORAL DAILY
Qty: 30 CAPSULE | Refills: 0 | Status: SHIPPED | OUTPATIENT
Start: 2024-11-12

## 2024-11-12 NOTE — PROGRESS NOTES
Clinic Care Coordination Contact  [facility]/Voicemail    Clinical Data: Care Coordinator Outreach    Outreach Documentation Number of Outreach Attempt   10/22/2024   2:36 PM 1   11/12/2024   3:30 PM 2       Unable to leave a message due to: Voicemail is full.    Plan: CHW will route to ADI Nelson, to review for disenrollment and/or further direction per standard work. No outreaches scheduled at this time.    Lucy Ray  Community Health Worker  [facility]  [phone]

## 2024-11-12 NOTE — TELEPHONE ENCOUNTER
"Date of Last Office Visit: 10/7/2024  [facility] & [facility]      RTC: 3mo  No shows: 0  Cancellations: 0  Date of Next Office Visit: 0  ------------------------------    Medication requested:     Disp Refills Start End WILLIAM   atomoxetine (STRATTERA) 10 MG capsule 30 capsule 0 10/7/2024 -- No   Sig - Route: Take 1 capsule (10 mg) by mouth daily. - Oral     ------------------------------  From last visit note:   \"- Restart atomoxetine at 10mg daily \"     Refill Decision:    [x]Medication refilled per  Medication Refill in Ambulatory Care  policy.         [x] Supervision: no future appointment scheduled. Scheduling has been notified to contact the pt for appointment.    []Medication unable to be refilled by RN due to criteria not met as indicated below:          [] Eligibility - Pt not seen within past 12 months, no future appointment       [] Supervision: no future appointment scheduled. Scheduling has been notified to contact the pt for appointment.       [] Compliance - lapse in therapy/gap in refills; No Shows; Cancellations       [] Verification - order discrepancy, clarification needed, modification needed       [] Zunilda refills given. Pt did not follow-up, pended to care team for decision       [] Controlled medication       [] Medication not included in refill protocol policy       [] Medication is Reported/Historical       [] Medication not active on Pt's med list       [] > 30-day supply request       [] Advanced refill request: > 7 days before refill date       [] Review is needed: new med; med adjusted <= 30 days; Safety Alert; requires lab monitoring       [] Last visit treatment plan \"Open/Pended/Unsigned\" - routing for review.       [] OTHER:                        "

## 2024-11-15 ENCOUNTER — PATIENT OUTREACH (OUTPATIENT)
Dept: CARE COORDINATION | Facility: CLINIC | Age: 49
End: 2024-11-15
Payer: COMMERCIAL

## 2024-11-15 NOTE — PROGRESS NOTES
Clinic Care Coordination Contact    Situation: Patient chart reviewed by care coordinator.    Background: Pt enrolled in clinic care coordination    Assessment: UTC x 2    Plan/Recommendations: Care Coordination program will be closed. No further outreaches planned.    Leslie Morfin [facility] Ambulatory Care Management  [facility]  Phone: [phone]  E-mail: [email or web address]

## 2024-12-09 ENCOUNTER — MYC REFILL (OUTPATIENT)
Dept: PSYCHIATRY | Facility: CLINIC | Age: 49
End: 2024-12-09
Payer: COMMERCIAL

## 2024-12-09 DIAGNOSIS — F90.9 ATTENTION DEFICIT HYPERACTIVITY DISORDER (ADHD), UNSPECIFIED ADHD TYPE: ICD-10-CM

## 2024-12-09 RX ORDER — ATOMOXETINE 10 MG/1
10 CAPSULE ORAL DAILY
Qty: 30 CAPSULE | Refills: 0 | Status: SHIPPED | OUTPATIENT
Start: 2024-12-09

## 2024-12-20 ENCOUNTER — TELEPHONE (OUTPATIENT)
Dept: PSYCHIATRY | Facility: CLINIC | Age: 49
End: 2024-12-20
Payer: COMMERCIAL

## 2024-12-20 NOTE — TELEPHONE ENCOUNTER
[facility] Call Center    Phone Message    May a detailed message be left on voicemail: yes     Reason for Call: Medication Refill Request    Has the patient contacted the pharmacy for the refill? Yes   Name of medication being requested: Strattera.   Provider who prescribed the medication: Dr. Oconnor   Pharmacy:  [facility] #[id number] [facility] [address] [facility] AT [facility] [address]   Date medication is needed: ASAP       Action Taken: Other: Nurses.     Travel Screening: Not Applicable     Date of Service:

## 2024-12-20 NOTE — TELEPHONE ENCOUNTER
Tarun stated she would like medication sent to the [facility] in [place], [place]:  [address] [place] [address]

## 2024-12-20 NOTE — TELEPHONE ENCOUNTER
Writer reviewed patient's chart. A refill of the atomoxetine was sent to [facility] in [place] on 12/9/24. She should have enough medication for 30 days. Called Maria Esther to discuss her request further and clarify if she filled the 12/9 prescription at the local pharmacy. No answer and VM was full.    Will send [employer] message.    Shital De Leon RN on 12/20/2024 at 3:52 PM

## 2024-12-30 ENCOUNTER — MYC REFILL (OUTPATIENT)
Dept: FAMILY MEDICINE | Facility: CLINIC | Age: 49
End: 2024-12-30
Payer: COMMERCIAL

## 2024-12-30 DIAGNOSIS — G25.81 RESTLESS LEG SYNDROME: ICD-10-CM

## 2024-12-31 RX ORDER — ROPINIROLE 0.5 MG/1
1.5 TABLET, FILM COATED ORAL DAILY PRN
Qty: 270 TABLET | Refills: 0 | Status: SHIPPED | OUTPATIENT
Start: 2024-12-31

## 2025-01-02 ENCOUNTER — OFFICE VISIT (OUTPATIENT)
Dept: FAMILY MEDICINE | Facility: CLINIC | Age: 50
End: 2025-01-02
Payer: COMMERCIAL

## 2025-01-02 VITALS
DIASTOLIC BLOOD PRESSURE: 68 MMHG | BODY MASS INDEX: 27.98 KG/M2 | HEART RATE: 104 BPM | RESPIRATION RATE: 16 BRPM | WEIGHT: 163 LBS | SYSTOLIC BLOOD PRESSURE: 116 MMHG | OXYGEN SATURATION: 98 % | TEMPERATURE: 97 F

## 2025-01-02 DIAGNOSIS — J02.0 STREPTOCOCCAL PHARYNGITIS: ICD-10-CM

## 2025-01-02 DIAGNOSIS — R07.0 THROAT PAIN: Primary | ICD-10-CM

## 2025-01-02 PROBLEM — Z01.419 WELL WOMAN EXAM: Status: RESOLVED | Noted: 2023-12-04 | Resolved: 2025-01-02

## 2025-01-02 LAB
DEPRECATED S PYO AG THROAT QL EIA: POSITIVE
FLUAV AG SPEC QL IA: NEGATIVE
FLUBV AG SPEC QL IA: NEGATIVE
SARS-COV-2 RNA RESP QL NAA+PROBE: NEGATIVE

## 2025-01-02 RX ORDER — PENICILLIN V POTASSIUM 500 MG/1
500 TABLET, FILM COATED ORAL 2 TIMES DAILY
Qty: 20 TABLET | Refills: 0 | Status: SHIPPED | OUTPATIENT
Start: 2025-01-02 | End: 2025-01-12

## 2025-01-02 RX ORDER — HYDROXYZINE PAMOATE 50 MG/1
CAPSULE ORAL
COMMUNITY
Start: 2024-11-30

## 2025-01-02 ASSESSMENT — PAIN SCALES - GENERAL: PAINLEVEL_OUTOF10: MODERATE PAIN (5)

## 2025-01-02 ASSESSMENT — PATIENT HEALTH QUESTIONNAIRE - PHQ9: SUM OF ALL RESPONSES TO PHQ QUESTIONS 1-9: 9

## 2025-01-02 NOTE — PROGRESS NOTES
Assessment & Plan     Throat pain     - Streptococcus A Rapid Screen w/Reflex to PCR - Clinic Collect  - Influenza A & B Antigen - Clinic Collect  - COVID-19 Virus (Coronavirus) by PCR Nasopharyngeal    Streptococcal pharyngitis     - penicillin V (VEETID) 500 MG tablet  Dispense: 20 tablet; Refill: 0    Discussed ibuprofen 600-800 mg every 6-8 hours as needed for sore throat, fevers, body aches.  PCN twice daily X 10 days.  Follow up if not improving.          See Patient Instructions    Kenia Mayo is a 49 year old, presenting for the following health issues:  Throat Problem and Headache        1/2/2025     9:04 AM   Additional Questions   Roomed by Melisa TABARES CMA   Accompanied by Self         1/2/2025     9:04 AM   Patient Reported Additional Medications   Patient reports taking the following new medications None     HPI       Acute Illness  Acute illness concerns: Throat pain, headache and ache  Onset/Duration: Yesterday morning  Symptoms:  Fever: No  Chills/Sweats: No  Headache (location?): YES- back of the neck  Sinus Pressure: No  Conjunctivitis:  No  Ear Pain: no  Rhinorrhea: No  Congestion: No  Sore Throat: YES  Cough: no  Wheeze: No  Decreased Appetite: YES  Nausea: YES  Vomiting: No  Diarrhea: No  Dysuria/Freq.: No  Dysuria or Hematuria: No  Fatigue/Achiness: YES  Sick/Strep Exposure: No-came back from [place] recently  Therapies tried and outcome: None    Reports symptoms onset 1-2 days ago - worse symptom is sore throat.  Denies nausea/vomiting  No known exposures - recently traveled to [place] - just returned on Monday.      Review of Systems  Constitutional, HEENT, cardiovascular, pulmonary, GI, , musculoskeletal, neuro, skin, endocrine and psych systems are negative, except as otherwise noted.      Objective    /68   Pulse 104   Temp 97  F (36.1  C) (Tympanic)   Resp 16   Wt 163 lb (73.9 kg)   LMP  (LMP Unknown)   SpO2 98%   BMI 27.98 kg/m    Body mass index is 27.98  kg/m .  Physical Exam   GENERAL: alert and no distress  HENT: normal cephalic/atraumatic, ear canals and TM's normal, nose and mouth without ulcers or lesions, oral mucous membranes moist, tonsillar hypertrophy, tonsillar erythema, and tonsillar exudate  NECK: no adenopathy, no asymmetry, masses, or scars  RESP: lungs clear to auscultation - no rales, rhonchi or wheezes  CV: regular rate and rhythm, normal S1 S2, no S3 or S4, no murmur, click or rub, no peripheral edema  ABDOMEN: soft, nontender, no hepatosplenomegaly, no masses and bowel sounds normal  MS: no gross musculoskeletal defects noted, no edema    No results found for any visits on 01/02/25.          Signed Electronically by: Lisa Katz DNP

## 2025-01-09 ENCOUNTER — MYC MEDICAL ADVICE (OUTPATIENT)
Dept: FAMILY MEDICINE | Facility: CLINIC | Age: 50
End: 2025-01-09
Payer: COMMERCIAL

## 2025-02-23 ENCOUNTER — MYC MEDICAL ADVICE (OUTPATIENT)
Dept: FAMILY MEDICINE | Facility: CLINIC | Age: 50
End: 2025-02-23
Payer: COMMERCIAL

## 2025-02-24 ENCOUNTER — E-VISIT (OUTPATIENT)
Dept: URGENT CARE | Facility: CLINIC | Age: 50
End: 2025-02-24
Payer: COMMERCIAL

## 2025-02-24 DIAGNOSIS — B00.1 HERPES LABIALIS: Primary | ICD-10-CM

## 2025-02-24 RX ORDER — VALACYCLOVIR HYDROCHLORIDE 1 G/1
2000 TABLET, FILM COATED ORAL 2 TIMES DAILY
Qty: 12 TABLET | Refills: 1 | Status: SHIPPED | OUTPATIENT
Start: 2025-02-24 | End: 2025-02-25

## 2025-02-25 NOTE — PATIENT INSTRUCTIONS
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    Based on your responses, I have prescribed valtrex to treat this. Please follow the instructions on the medication. If you experience irritation of your skin, new rash, or any other new symptoms, you should stop using this medication and contact your primary care provider.    If this treatment does not work for you or your sores are worsening instead of improving or do not resolve in 7 days, please plan to follow-up with your primary care provider. They may be able to offer refills for you for future outbreaks as well.    Thanks for choosing?us?as your health care partner,?   ?   Bhavya Matrino, CNP?   Cold Sores: Care Instructions  Overview  Cold sores are clusters of small blisters on the lip and skin around or inside the mouth. Often the first sign of a cold sore is a spot that tingles, burns, or itches. A blister usually forms within 24 hours. They are sometimes called fever blisters. The skin around the blisters can be red and inflamed. The blisters can break open, weep a clear fluid, and then scab over after a few days. Cold sores often heal in 7 to 10 days with no scar.  Cold sores are caused by the herpes simplex virus. The virus is spread by skin-to-skin contact. That means that if you have a cold sore and kiss another person, that person could get a cold sore too.  This is the same virus that causes some cases of genital herpes. So if you have a cold sore and have oral sex with someone, that person could get a sore in the genital area.  Cold sores will often go away on their own. But if they're painful, ask your doctor about a prescription antiviral medicine. It can relieve pain, help prevent outbreaks, and shorten the healing time.  Follow-up care is a key part of your treatment and safety. Be sure to make and go to all appointments, and  call your doctor if you are having problems. It's also a good idea to know your test results and keep a list of the medicines you take.  How can you care for yourself at home?  Wash your hands often. And try not to touch your cold sores. This will help to avoid spreading the virus to your eyes or genital area or to other people. This is more likely to happen if this is your first cold sore outbreak.  To help relieve pain, try placing a cold, wet towel on the sore. This can also reduce swelling.  If you are just getting a cold sore, try using over-the-counter docosanol (Abreva) cream to reduce symptoms.  If your doctor prescribed antiviral medicine to relieve pain and shorten the healing time, be sure to follow the directions.  Take an over-the-counter pain medicine, such as acetaminophen (Tylenol), ibuprofen (Advil, Motrin), or naproxen (Aleve), as needed. Read and follow all instructions on the label. No one younger than 20 should take aspirin. It has been linked to Reye syndrome, a serious illness.  Do not take two or more pain medicines at the same time unless the doctor told you to. Many pain medicines have acetaminophen, which is Tylenol. Too much acetaminophen (Tylenol) can be harmful.  Avoid citrus fruit, tomatoes, and other foods that contain acid.  Use over-the-counter ointments, such as Anbesol or Orajel, to numb sore areas in the mouth or on the lips.  Do not kiss or have oral sex with anyone while you have a cold sore.  To prevent cold sores in the future  Avoid long exposure of your lips to sunlight. (Wear a hat to help shade your mouth.)  Using lip balm that contains sunscreen may help reduce outbreaks of cold sores.  Do not share towels, razors, silverware, toothbrushes, or other objects with a person who has a cold sore.  For frequent or painful cold sores, try taking an antiviral medicine daily to decrease outbreaks.  When should you call for help?   Call your doctor now or seek immediate medical  "care if:    Your symptoms are painful and you want to try antiviral medicine.     You have signs of infection, such as:  Increased pain, swelling, warmth, or redness.  Red streaks leading from a cold sore.  Pus draining from a cold sore.  A fever.     You have a cold sore and develop eye pain, eye discharge, or any changes in your vision.   Watch closely for changes in your health, and be sure to contact your doctor if:    The cold sore does not heal in 7 to 10 days.     You get cold sores often.   Where can you learn more?  Go to https://www.[employer].net/patiented  Enter R850 in the search box to learn more about \"Cold Sores: Care Instructions.\"  Current as of: July 31, 2024  Content Version: 14.3    2024 [employer].   Care instructions adapted under license by your healthcare professional. If you have questions about a medical condition or this instruction, always ask your healthcare professional. [employer] disclaims any warranty or liability for your use of this information.    "

## 2025-02-25 NOTE — TELEPHONE ENCOUNTER
Noted.   Patient scheduled evisit w/ different provider.   Will close encounter.     Devika WILLIAMSON

## 2025-03-30 DIAGNOSIS — G25.81 RESTLESS LEG SYNDROME: ICD-10-CM

## 2025-03-31 RX ORDER — ROPINIROLE 0.5 MG/1
TABLET, FILM COATED ORAL
Qty: 270 TABLET | Refills: 0 | Status: SHIPPED | OUTPATIENT
Start: 2025-03-31

## 2025-04-27 ENCOUNTER — MYC REFILL (OUTPATIENT)
Dept: FAMILY MEDICINE | Facility: CLINIC | Age: 50
End: 2025-04-27
Payer: COMMERCIAL

## 2025-04-27 DIAGNOSIS — F33.0 MILD EPISODE OF RECURRENT MAJOR DEPRESSIVE DISORDER: ICD-10-CM

## 2025-04-27 DIAGNOSIS — G25.81 RESTLESS LEG SYNDROME: ICD-10-CM

## 2025-04-27 RX ORDER — BUPROPION HYDROCHLORIDE 300 MG/1
300 TABLET ORAL EVERY MORNING
Qty: 90 TABLET | Refills: 0 | Status: CANCELLED | OUTPATIENT
Start: 2025-04-27

## 2025-04-27 RX ORDER — ROPINIROLE 0.5 MG/1
TABLET, FILM COATED ORAL
Qty: 270 TABLET | Refills: 0 | Status: CANCELLED | OUTPATIENT
Start: 2025-04-27

## 2025-04-28 NOTE — TELEPHONE ENCOUNTER
Contacted pharmacy  Prescription still active on file  Mychart update sent to patient    Gabby MAY RN

## 2025-04-28 NOTE — TELEPHONE ENCOUNTER
Clinic RN: Please investigate patient's chart or contact patient if the information cannot be found because  patient states pharmacy cancelled Rx. Please call and verify  Thanks,  Trixie WELCH RN

## 2025-04-28 NOTE — TELEPHONE ENCOUNTER
Chart Review:    Pharmacy dispense report:  rOPINIRole HCl   Dispensed Days Supply Quantity Provider Pharmacy   ROPINIROLE 0.5MG TABLETS 01/02/2025 90 270 Units Hyun Beltran PA-C [facility] #...   ROPINIROLE 0.5MG TABLETS 12/01/2024 30 60 Units Hyun Beltran PA-C [facility] #...   ROPINIROLE 0.5MG TABLETS 08/14/2024 85 255 Units Hyun Beltran PA-C [facility] #...   ROPINIROLE 0.5MG TABLETS 07/26/2024 5 15 Units Hyun Beltran PA-C [facility] #...   ROPINIROLE HCL 0.5 MG TABLET 07/17/2024 30 60 Units Hyun Beltran PA-C [facility] #[id number]   ROPINIROLE   TAB 0.5MG 05/21/2024 30 60 Units Hyun Beltran PA-C [facility]...     Pt has Rx available at pharmacy  MyC message sent    Sonia COTE RN

## 2025-04-28 NOTE — TELEPHONE ENCOUNTER
Clinic RN: Please investigate patient's chart or contact patient if the information cannot be found because: patient states pharmacy cancelled Rx. Please call pharmacy and verify  Thanks,  Trixie WELCH RN

## 2025-07-30 ENCOUNTER — PATIENT OUTREACH (OUTPATIENT)
Dept: CARE COORDINATION | Facility: CLINIC | Age: 50
End: 2025-07-30
Payer: COMMERCIAL

## 2025-08-13 ENCOUNTER — PATIENT OUTREACH (OUTPATIENT)
Dept: CARE COORDINATION | Facility: CLINIC | Age: 50
End: 2025-08-13
Payer: COMMERCIAL